# Patient Record
Sex: MALE | Race: WHITE | NOT HISPANIC OR LATINO | Employment: FULL TIME | URBAN - METROPOLITAN AREA
[De-identification: names, ages, dates, MRNs, and addresses within clinical notes are randomized per-mention and may not be internally consistent; named-entity substitution may affect disease eponyms.]

---

## 2017-01-11 ENCOUNTER — ALLSCRIPTS OFFICE VISIT (OUTPATIENT)
Dept: OTHER | Facility: OTHER | Age: 22
End: 2017-01-11

## 2017-02-14 ENCOUNTER — ALLSCRIPTS OFFICE VISIT (OUTPATIENT)
Dept: OTHER | Facility: OTHER | Age: 22
End: 2017-02-14

## 2017-02-14 DIAGNOSIS — R10.11 RIGHT UPPER QUADRANT PAIN: ICD-10-CM

## 2017-02-14 DIAGNOSIS — Z13.220 ENCOUNTER FOR SCREENING FOR LIPOID DISORDERS: ICD-10-CM

## 2017-06-21 ENCOUNTER — ALLSCRIPTS OFFICE VISIT (OUTPATIENT)
Dept: OTHER | Facility: OTHER | Age: 22
End: 2017-06-21

## 2017-06-24 LAB — CULTURE RESULT (HISTORICAL): NORMAL

## 2017-12-04 ENCOUNTER — ALLSCRIPTS OFFICE VISIT (OUTPATIENT)
Dept: OTHER | Facility: OTHER | Age: 22
End: 2017-12-04

## 2017-12-06 NOTE — PROGRESS NOTES
Assessment    1  Tongue sore (529 6) (K14 6)    Discussion/Summary  Patient reassured  His tongue is anatomically normal    Chief Complaint  Patient is here today with complaints of lumps on his tongue that he noticed a week ago, they are not painful  History of Present Illness  HPI: Next less is worried about some bumps on his tongue, in the far rear of his tongue  They do not hurt him  He has no systemic symptoms  He would like me to take a look  Active Problems  1  Abdominal pain, RUQ (789 01) (R10 11)   2  Acute infective pharyngitis (462) (J02 9)   3  Contusion of left eyelid, initial encounter (921 1) (S00 12XA)   4  Inguinal lymphadenopathy (785 6) (R59 0)   5  Palpitations (785 1) (R00 2)   6  Sacroiliitis (720 2) (M46 1)   7  Screening for lipoid disorders (V77 91) (Z13 220)   8  Spina bifida occulta (756 17) (Q76 0)   9  Spondylolisthesis, lumbosacral region (738 4) (M43 17)   10  Swimmer's ear, right (380 12) (H60 331)    Past Medical History  1  History of Denial Of Any Significant Medical History    Family History  Mother    1  Family history of Family Health Status Of Mother - Alive  Father    2  Family history of Family Health Status Of Father - Alive  Sister    3  Family history of Adult ADHD    Social History   · Caffeine Use   · Never A Smoker   · Never Drank Alcohol    Allergies  1  No Known Drug Allergies    Vitals   Recorded: [id number] 03:06PM   Heart Rate 64   Respiration 16   Systolic 946   Diastolic 76   Weight 634 lb 8 oz       Physical Exam   Constitutional  General appearance: No acute distress, well appearing and well nourished  Eyes  Conjunctiva and lids: No swelling, erythema, or discharge  Pupils and irises: Equal, round and reactive to light  Ears, Nose, Mouth, and Throat  External inspection of ears and nose: Normal    Otoscopic examination: Tympanic membrance translucent with normal light reflex  Canals patent without erythema     Nasal mucosa, septum, and turbinates: Normal without edema or erythema  Oropharynx: Normal with no erythema, edema, exudate or lesions  -- Normal papillae of the tongue  There is no ACL a or PCL a noted on exam   Pulmonary  Auscultation of lungs: Clear to auscultation, equal breath sounds bilaterally, no wheezes, no rales, no rhonci           Signatures   Electronically signed by : SHO Harris ; Dec  4 2017  9:25PM EST                       (Author)

## 2018-01-13 VITALS
HEART RATE: 70 BPM | SYSTOLIC BLOOD PRESSURE: 128 MMHG | WEIGHT: 160.13 LBS | RESPIRATION RATE: 16 BRPM | DIASTOLIC BLOOD PRESSURE: 60 MMHG | HEIGHT: 65 IN | BODY MASS INDEX: 26.68 KG/M2 | TEMPERATURE: 97.5 F

## 2018-01-14 VITALS
DIASTOLIC BLOOD PRESSURE: 74 MMHG | SYSTOLIC BLOOD PRESSURE: 120 MMHG | RESPIRATION RATE: 16 BRPM | TEMPERATURE: 98.6 F | WEIGHT: 153 LBS | HEART RATE: 72 BPM | BODY MASS INDEX: 25.46 KG/M2

## 2018-01-16 NOTE — PROGRESS NOTES
Assessment    1  Palpitations (785 1) (R00 2)   2  Encounter for preventive health examination (V70 0) (Z00 00)    Discussion/Summary  History is highly suggestive of PACs    he will call if he wants holter monitor  He is having some anxiety  Appropriate for where he is in terms of career  Impression: health maintenance visit  Currently, he eats a healthy diet and has an adequate exercise regimen  Prostate cancer screening: PSA is not indicated  Testicular cancer screening: the risks and benefits of testicular cancer screening were discussed  Chief Complaint    1  Palpitations    History of Present Illness  HM, Adult Male: The patient is being seen for a health maintenance evaluation  General Health: The patient's health since the last visit is described as good  He has regular dental visits  He denies vision problems  Lifestyle:  He consumes a diverse and healthy diet  He does not have any weight concerns  He exercises regularly  He does not use tobacco  He denies alcohol use  He denies drug use  Reproductive health:  the patient is not sexually active  birth control is not being practiced  He denies erectile dysfunction  Screening:   HPI: When he is at rest he notices a skipped heart beat, which radiates into his neck  He does not get SOB or CP  No light headed-ness  When he is working out he does not notice  He does not notice it at work when he is busy  Does not wake him up at night  He drinks little coffee or caffeine  He does not smoke  He is anxious and he is concerned about career choice and education  Palpitations: Irvin Rossi presents with complaints of palpitations  Associated symptoms include an irregular heartbeat, but no rapid heartbeat, no a fluttering heartbeat, no a pounding heartbeat, no chest pain, no diaphoresis, no dizziness, no dyspnea, no leg swelling, no lightheadedness, no nausea, no syncope and no weakness        Review of Systems    Constitutional: No fever or chills, feels well, no tiredness, no recent weight gain or weight loss  Eyes: No complaints of eye pain, no red eyes, no discharge from eyes, no itchy eyes  ENT: no complaints of earache, no hearing loss, no nosebleeds, no nasal discharge, no sore throat, no hoarseness  Cardiovascular: as noted in HPI  Respiratory: No complaints of shortness of breath, no wheezing, no cough, no SOB on exertion, no orthopnea or PND  Gastrointestinal: No complaints of abdominal pain, no constipation, no nausea or vomiting, no diarrhea or bloody stools  Genitourinary: No complaints of dysuria, no incontinence, no hesitancy, no nocturia, no genital lesion, no testicular pain  Musculoskeletal: No complaints of arthralgia, no myalgias, no joint swelling or stiffness, no limb pain or swelling  Integumentary: No complaints of skin rash or skin lesions, no itching, no skin wound, no dry skin  Neurological: No compliants of headache, no confusion, no convulsions, no numbness or tingling, no dizziness or fainting, no limb weakness, no difficulty walking  Active Problems    1  Contusion of left eyelid, initial encounter (921 1) (S00 12XA)   2  Inguinal lymphadenopathy (785 6) (R59 0)   3  Palpitations (785 1) (R00 2)   4  Sacroiliitis (720 2) (M46 1)   5  Spina bifida occulta (756 17) (Q76 0)   6  Spondylolisthesis, lumbosacral region (738 4) (M43 17)    Past Medical History    · History of Denial Of Any Significant Medical History    Surgical History    The surgical history was reviewed and updated today  Family History  Mother    · Family history of Family Health Status Of Mother - Alive  Father    · Family history of Family Health Status Of Father - Alive  Sister    · Family history of Adult ADHD    The family history was reviewed and updated today  Social History    · Caffeine Use   · Never A Smoker   · Never Drank Alcohol  The social history was reviewed and updated today   The social history was reviewed and is unchanged  Current Meds   1  No Reported Medications Recorded    Allergies    1  No Known Drug Allergies    Vitals   Recorded: [id number] 06:53PM   Heart Rate 68   Respiration 16   Systolic 900   Diastolic 80   Height 5 ft 5 in   Weight 157 lb 4 00 oz   BMI Calculated 26 17   BSA Calculated 1 79     Physical Exam    Constitutional   General appearance: No acute distress, well appearing and well nourished  Eyes   Conjunctiva and lids: No swelling, erythema, or discharge  Pupils and irises: Equal, round and reactive to light  Ears, Nose, Mouth, and Throat   External inspection of ears and nose: Normal     Otoscopic examination: Tympanic membrance translucent with normal light reflex  Canals patent without erythema  Oropharynx: Normal with no erythema, edema, exudate or lesions  Pulmonary   Respiratory effort: No increased work of breathing or signs of respiratory distress  Auscultation of lungs: Clear to auscultation  Cardiovascular   Palpation of heart: Normal PMI, no thrills  Auscultation of heart: Normal rate and rhythm, normal S1 and S2, without murmurs  Examination of extremities for edema and/or varicosities: Normal     Abdomen   Abdomen: Non-tender, no masses  Liver and spleen: No hepatomegaly or splenomegaly  Lymphatic   Palpation of lymph nodes in neck: No lymphadenopathy  Musculoskeletal   Gait and station: Normal     Digits and nails: Normal without clubbing or cyanosis  Inspection/palpation of joints, bones, and muscles: Normal     Skin   Skin and subcutaneous tissue: Normal without rashes or lesions  Neurologic   Cranial nerves: Cranial nerves 2-12 intact  Reflexes: 2+ and symmetric  Sensation: No sensory loss      Psychiatric   Orientation to person, place and time: Normal     Mood and affect: Normal        Signatures   Electronically signed by : SHO Brenner ; Jan 11 2017  7:35PM EST                       (Author)

## 2018-01-22 VITALS
HEART RATE: 68 BPM | WEIGHT: 157.25 LBS | BODY MASS INDEX: 26.2 KG/M2 | RESPIRATION RATE: 16 BRPM | SYSTOLIC BLOOD PRESSURE: 130 MMHG | DIASTOLIC BLOOD PRESSURE: 80 MMHG | HEIGHT: 65 IN

## 2018-01-23 VITALS
BODY MASS INDEX: 26.21 KG/M2 | HEART RATE: 64 BPM | RESPIRATION RATE: 16 BRPM | SYSTOLIC BLOOD PRESSURE: 126 MMHG | DIASTOLIC BLOOD PRESSURE: 76 MMHG | WEIGHT: 157.5 LBS

## 2018-04-06 ENCOUNTER — HOSPITAL ENCOUNTER (EMERGENCY)
Facility: HOSPITAL | Age: 23
Discharge: HOME | End: 2018-04-07
Attending: EMERGENCY MEDICINE
Payer: COMMERCIAL

## 2018-04-06 VITALS
DIASTOLIC BLOOD PRESSURE: 77 MMHG | TEMPERATURE: 97.6 F | WEIGHT: 169.2 LBS | BODY MASS INDEX: 28.16 KG/M2 | HEART RATE: 78 BPM | SYSTOLIC BLOOD PRESSURE: 151 MMHG | OXYGEN SATURATION: 97 % | RESPIRATION RATE: 18 BRPM

## 2018-04-06 DIAGNOSIS — S01.91XA LACERATION OF HEAD: Primary | ICD-10-CM

## 2018-04-06 RX ADMIN — Medication 1 APPLICATION: at 23:13

## 2018-04-07 PROCEDURE — 99283 EMERGENCY DEPT VISIT LOW MDM: CPT

## 2018-04-07 NOTE — DISCHARGE INSTRUCTIONS
Return in 7-10 days for staple removal       Laceration   WHAT YOU NEED TO KNOW:   A laceration is an injury to the skin and the soft tissue underneath it  Lacerations happen when you are cut or hit by something  They can happen anywhere on the body  DISCHARGE INSTRUCTIONS:   Return to the emergency department if:   · You have heavy bleeding or bleeding that does not stop after 10 minutes of holding firm, direct pressure over the wound  · Your wound opens up  Contact your healthcare provider if:   · You have a fever or chills  · Your laceration is red, warm, or swollen  · You have red streaks on your skin coming from your wound  · You have white or yellow drainage from the wound that smells bad  · You have pain that gets worse, even after treatment  · You have questions or concerns about your condition or care  Medicines:   · Prescription pain medicine  may be given  Ask how to take this medicine safely  · Antibiotics  help treat or prevent a bacterial infection  · Take your medicine as directed  Contact your healthcare provider if you think your medicine is not helping or if you have side effects  Tell him or her if you are allergic to any medicine  Keep a list of the medicines, vitamins, and herbs you take  Include the amounts, and when and why you take them  Bring the list or the pill bottles to follow-up visits  Carry your medicine list with you in case of an emergency  Care for your wound as directed:   · Do not get your wound wet  until your healthcare provider says it is okay  Do not soak your wound in water  Do not go swimming until your healthcare provider says it is okay  Carefully wash the wound with soap and water  Gently pat the area dry or allow it to air dry  · Change your bandages  when they get wet, dirty, or after washing  Apply new, clean bandages as directed  Do not apply elastic bandages or tape too tight  Do not put powders or lotions over your incision  · Apply antibiotic ointment as directed  Your healthcare provider may give you antibiotic ointment to put over your wound if you have stitches  If you have strips of tape over your incision, let them dry up and fall off on their own  If they do not fall off within 14 days, gently remove them  If you have glue over your wound, do not remove or pick at it  If your glue comes off, do not replace it with glue that you have at home  · Check your wound every day for signs of infection such as swelling, redness, or pus  Self-care:   · Apply ice  on your wound for 15 to 20 minutes every hour or as directed  Use an ice pack, or put crushed ice in a plastic bag  Cover it with a towel  Ice helps prevent tissue damage and decreases swelling and pain  · Use a splint as directed  A splint will decrease movement and stress on your wound  It may help it heal faster  A splint may be used for lacerations over joints or areas of your body that bend  Ask your healthcare provider how to apply and remove a splint  · Decrease scarring of your wound  by applying ointments as directed  Do not apply ointments until your healthcare provider says it is okay  You may need to wait until your wound is healed  Ask which ointment to buy and how often to use it  After your wound is healed, use sunscreen over the area when you are out in the sun  You should do this for at least 6 months to 1 year after your injury  Follow up with your healthcare provider as directed: You may need to follow up in 24 to 48 hours to have your wound checked for infection  You will need to return in 3 to 14 days if you have stitches or staples so they can be removed  Care for your wound as directed to prevent infection and help it heal  Write down your questions so you remember to ask them during your visits    © 2017 [employer] Information is for End User's use only and may not be sold, redistributed or otherwise used for commercial purposes  All illustrations and images included in CareNotes® are the copyrighted property of A [employer] Inc  or Aiden Li  The above information is an  only  It is not intended as medical advice for individual conditions or treatments  Talk to your doctor, nurse or pharmacist before following any medical regimen to see if it is safe and effective for you
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
## 2018-05-30 ENCOUNTER — OFFICE VISIT (OUTPATIENT)
Dept: FAMILY MEDICINE CLINIC | Facility: MEDICAL CENTER | Age: 23
End: 2018-05-30
Payer: COMMERCIAL

## 2018-05-30 VITALS
HEART RATE: 60 BPM | TEMPERATURE: 97.7 F | DIASTOLIC BLOOD PRESSURE: 80 MMHG | BODY MASS INDEX: 28.12 KG/M2 | OXYGEN SATURATION: 97 % | WEIGHT: 169 LBS | SYSTOLIC BLOOD PRESSURE: 120 MMHG

## 2018-05-30 DIAGNOSIS — R00.2 PALPITATIONS: Primary | ICD-10-CM

## 2018-05-30 DIAGNOSIS — F41.1 GAD (GENERALIZED ANXIETY DISORDER): ICD-10-CM

## 2018-05-30 PROCEDURE — 99213 OFFICE O/P EST LOW 20 MIN: CPT | Performed by: FAMILY MEDICINE

## 2018-08-15 ENCOUNTER — OFFICE VISIT (OUTPATIENT)
Dept: FAMILY MEDICINE CLINIC | Facility: MEDICAL CENTER | Age: 23
End: 2018-08-15
Payer: COMMERCIAL

## 2018-08-15 VITALS
WEIGHT: 164.2 LBS | SYSTOLIC BLOOD PRESSURE: 120 MMHG | HEART RATE: 80 BPM | TEMPERATURE: 98.4 F | BODY MASS INDEX: 27.32 KG/M2 | DIASTOLIC BLOOD PRESSURE: 82 MMHG | OXYGEN SATURATION: 98 %

## 2018-08-15 DIAGNOSIS — R00.2 PALPITATIONS: ICD-10-CM

## 2018-08-15 DIAGNOSIS — Z13.220 SCREENING FOR LIPID DISORDERS: ICD-10-CM

## 2018-08-15 DIAGNOSIS — T73.2XXD FATIGUE DUE TO EXPOSURE, SUBSEQUENT ENCOUNTER: Primary | ICD-10-CM

## 2018-08-15 DIAGNOSIS — Z13.1 SCREENING FOR DIABETES MELLITUS: ICD-10-CM

## 2018-08-15 PROCEDURE — 99213 OFFICE O/P EST LOW 20 MIN: CPT | Performed by: FAMILY MEDICINE

## 2018-08-16 NOTE — PROGRESS NOTES
Patient was seen at urgent care on August 9th  He was feeling poorly, generalized malaise, week  Those notes were reviewed  A blood sugar was done at the urgent care which was basically normal   A Lyme titer was ordered which was equivocal     He is feeling much better today  /82 (BP Location: Left arm, Patient Position: Sitting, Cuff Size: Large)   Pulse 80   Temp 98 4 °F (36 9 °C)   Wt 74 5 kg (164 lb 3 2 oz)   SpO2 98%   BMI 27 32 kg/m²     HEENT examination is within normal limits no acute findings  Neck was supple  Chest clear  Cardiac exam revealed a regular rate and rhythm without murmur rub or gallop  Abdomen is soft and nontender  Will continue to observe  He should stay well hydrated at work  Because of the equivocal Lyme titer and his generalized nonspecific symptoms, I do think we need to check a repeat Lyme  He is also interested in getting additional blood work for screening purposes

## 2019-01-02 ENCOUNTER — OFFICE VISIT (OUTPATIENT)
Dept: FAMILY MEDICINE CLINIC | Facility: MEDICAL CENTER | Age: 24
End: 2019-01-02
Payer: COMMERCIAL

## 2019-01-02 VITALS
WEIGHT: 173 LBS | DIASTOLIC BLOOD PRESSURE: 88 MMHG | OXYGEN SATURATION: 98 % | SYSTOLIC BLOOD PRESSURE: 130 MMHG | HEART RATE: 86 BPM | BODY MASS INDEX: 28.79 KG/M2

## 2019-01-02 DIAGNOSIS — F41.1 GAD (GENERALIZED ANXIETY DISORDER): Primary | ICD-10-CM

## 2019-01-02 PROCEDURE — 99213 OFFICE O/P EST LOW 20 MIN: CPT | Performed by: FAMILY MEDICINE

## 2019-01-02 RX ORDER — ESCITALOPRAM OXALATE 10 MG/1
10 TABLET ORAL DAILY
Qty: 30 TABLET | Refills: 1 | Status: SHIPPED | OUTPATIENT
Start: 2019-01-02 | End: 2019-04-23 | Stop reason: SDUPTHER

## 2019-01-02 RX ORDER — HYDROXYZINE PAMOATE 25 MG/1
25-50 CAPSULE ORAL
Qty: 30 CAPSULE | Refills: 0 | Status: SHIPPED | OUTPATIENT
Start: 2019-01-02 | End: 2019-04-23 | Stop reason: ALTCHOICE

## 2019-01-03 ENCOUNTER — TELEPHONE (OUTPATIENT)
Dept: FAMILY MEDICINE CLINIC | Facility: MEDICAL CENTER | Age: 24
End: 2019-01-03

## 2019-01-03 NOTE — TELEPHONE ENCOUNTER
Using p r n  Medication for anxiety was exactly what I was trying to avoid  I did not in any way think he needed to take Lexapro indefinitely  I would recommend that he reconsider his decision not to try the medication  I simply wanted to use that for about 6-8 months  But that is okay if he does not want to take it  I would like to reinforce the idea that he see a counselor  He does not need to keep the follow-up appointment that was made, verify that with him  If he wants to keep but he can if not please take it out of the schedule

## 2019-01-03 NOTE — PROGRESS NOTES
Patient has long history of anxiety  Anxiety also runs in his family  He has a hard time sleeping  He also frequently gets on the verge of panic often times at work  There is no history of suicidal ideation  /88 (BP Location: Left arm, Patient Position: Sitting, Cuff Size: Large)   Pulse 86   Wt 78 5 kg (173 lb)   SpO2 98%   BMI 28 79 kg/m²     Patient appears well his affect is normal thought content is normal     Will begin Lexapro at 10 mg  He was given some hydroxyzine by a neighbor and he felt that that worked very well to help him get to sleep  Will use hydroxyzine short term  Recheck in approximately three weeks

## 2019-01-03 NOTE — TELEPHONE ENCOUNTER
Pt called to let you know that he prefers not to take the Lexapro prescribed at appointment  He states he does not want a daily medication  He says that makes him uncomfortable and prefers something more short term and on an as needed basis  He agrees to the Hydroxyzine but not the Lexapro   FYI

## 2019-01-03 NOTE — TELEPHONE ENCOUNTER
Pt states he will consider taking the Lexapro  He will let us know within the next week if he decides to start it  He does not want to cancel the appointment for a f/u yet until he makes up his mind about starting the Lexapro

## 2019-02-13 ENCOUNTER — OFFICE VISIT (OUTPATIENT)
Dept: FAMILY MEDICINE CLINIC | Facility: MEDICAL CENTER | Age: 24
End: 2019-02-13
Payer: COMMERCIAL

## 2019-02-13 VITALS
BODY MASS INDEX: 28.29 KG/M2 | WEIGHT: 170 LBS | HEART RATE: 84 BPM | OXYGEN SATURATION: 98 % | SYSTOLIC BLOOD PRESSURE: 120 MMHG | DIASTOLIC BLOOD PRESSURE: 78 MMHG

## 2019-02-13 DIAGNOSIS — F41.1 GAD (GENERALIZED ANXIETY DISORDER): Primary | ICD-10-CM

## 2019-02-13 PROCEDURE — 99213 OFFICE O/P EST LOW 20 MIN: CPT | Performed by: FAMILY MEDICINE

## 2019-03-10 DIAGNOSIS — F41.1 GAD (GENERALIZED ANXIETY DISORDER): ICD-10-CM

## 2019-03-10 RX ORDER — ESCITALOPRAM OXALATE 10 MG/1
TABLET ORAL
Qty: 30 TABLET | Refills: 0 | OUTPATIENT
Start: 2019-03-10

## 2019-03-11 NOTE — PROGRESS NOTES
Harmony Moore is here for follow-up of anxiety, at his previous visit we started Lexapro  He has decided that he does not need the Lexapro  He has quit taking it  He is doing well  Past medical history, past surgical history, family medical history, social history, and medication list were all reviewed  Review of systems for GI  cardiac pulmonary and neurologic systems are all negative  ENT review of systems is also negative  /78 (BP Location: Left arm, Patient Position: Sitting, Cuff Size: Large)   Pulse 84   Wt 77 1 kg (170 lb)   SpO2 98%   BMI 28 29 kg/m²     He looks well, mental status is normal   Emotional affect is appropriate  He will continue to not use the Lexapro  He can follow up with me at any time if he wants to reconsider

## 2019-03-19 DIAGNOSIS — F41.1 GAD (GENERALIZED ANXIETY DISORDER): ICD-10-CM

## 2019-03-20 RX ORDER — ESCITALOPRAM OXALATE 10 MG/1
TABLET ORAL
Qty: 30 TABLET | Refills: 0 | OUTPATIENT
Start: 2019-03-20

## 2019-03-31 DIAGNOSIS — F41.1 GAD (GENERALIZED ANXIETY DISORDER): ICD-10-CM

## 2019-03-31 RX ORDER — ESCITALOPRAM OXALATE 10 MG/1
TABLET ORAL
Qty: 30 TABLET | Refills: 0 | OUTPATIENT
Start: 2019-03-31

## 2019-04-23 ENCOUNTER — OFFICE VISIT (OUTPATIENT)
Dept: FAMILY MEDICINE CLINIC | Facility: MEDICAL CENTER | Age: 24
End: 2019-04-23
Payer: COMMERCIAL

## 2019-04-23 VITALS
BODY MASS INDEX: 31.12 KG/M2 | DIASTOLIC BLOOD PRESSURE: 88 MMHG | RESPIRATION RATE: 18 BRPM | HEART RATE: 80 BPM | SYSTOLIC BLOOD PRESSURE: 140 MMHG | WEIGHT: 187 LBS

## 2019-04-23 DIAGNOSIS — F41.1 GAD (GENERALIZED ANXIETY DISORDER): ICD-10-CM

## 2019-04-23 PROCEDURE — 99213 OFFICE O/P EST LOW 20 MIN: CPT | Performed by: FAMILY MEDICINE

## 2019-04-24 RX ORDER — ESCITALOPRAM OXALATE 10 MG/1
10 TABLET ORAL DAILY
Qty: 90 TABLET | Refills: 1 | Status: SHIPPED | OUTPATIENT
Start: 2019-04-24 | End: 2019-04-29 | Stop reason: SDUPTHER

## 2019-04-29 ENCOUNTER — TELEPHONE (OUTPATIENT)
Dept: FAMILY MEDICINE CLINIC | Facility: MEDICAL CENTER | Age: 24
End: 2019-04-29

## 2019-04-29 DIAGNOSIS — F41.1 GAD (GENERALIZED ANXIETY DISORDER): ICD-10-CM

## 2019-04-29 RX ORDER — ESCITALOPRAM OXALATE 10 MG/1
10 TABLET ORAL DAILY
Qty: 90 TABLET | Refills: 1 | Status: SHIPPED | OUTPATIENT
Start: 2019-04-29 | End: 2019-10-16 | Stop reason: SDUPTHER

## 2019-07-29 ENCOUNTER — HOSPITAL ENCOUNTER (EMERGENCY)
Facility: HOSPITAL | Age: 24
Discharge: HOME | End: 2019-07-29
Attending: EMERGENCY MEDICINE | Admitting: EMERGENCY MEDICINE
Payer: COMMERCIAL

## 2019-07-29 ENCOUNTER — APPOINTMENT (EMERGENCY)
Dept: CT IMAGING | Facility: HOSPITAL | Age: 24
End: 2019-07-29
Payer: COMMERCIAL

## 2019-07-29 VITALS
RESPIRATION RATE: 20 BRPM | TEMPERATURE: 99.5 F | BODY MASS INDEX: 27.46 KG/M2 | OXYGEN SATURATION: 97 % | WEIGHT: 165 LBS | SYSTOLIC BLOOD PRESSURE: 183 MMHG | DIASTOLIC BLOOD PRESSURE: 80 MMHG | HEART RATE: 68 BPM

## 2019-07-29 DIAGNOSIS — S16.1XXA CERVICAL STRAIN, ACUTE, INITIAL ENCOUNTER: ICD-10-CM

## 2019-07-29 DIAGNOSIS — S19.9XXA INJURY OF NECK, INITIAL ENCOUNTER: Primary | ICD-10-CM

## 2019-07-29 PROCEDURE — 99284 EMERGENCY DEPT VISIT MOD MDM: CPT | Performed by: EMERGENCY MEDICINE

## 2019-07-29 PROCEDURE — 72125 CT NECK SPINE W/O DYE: CPT

## 2019-07-29 PROCEDURE — 96372 THER/PROPH/DIAG INJ SC/IM: CPT

## 2019-07-29 PROCEDURE — 72128 CT CHEST SPINE W/O DYE: CPT

## 2019-07-29 PROCEDURE — 99283 EMERGENCY DEPT VISIT LOW MDM: CPT

## 2019-07-29 RX ORDER — METHOCARBAMOL 500 MG/1
750 TABLET, FILM COATED ORAL ONCE
Status: COMPLETED | OUTPATIENT
Start: 2019-07-29 | End: 2019-07-29

## 2019-07-29 RX ORDER — NAPROXEN 500 MG/1
500 TABLET ORAL 2 TIMES DAILY PRN
Qty: 30 TABLET | Refills: 0 | Status: SHIPPED | OUTPATIENT
Start: 2019-07-29 | End: 2019-10-16 | Stop reason: ALTCHOICE

## 2019-07-29 RX ORDER — METHOCARBAMOL 750 MG/1
750 TABLET, FILM COATED ORAL 3 TIMES DAILY PRN
Qty: 21 TABLET | Refills: 0 | Status: SHIPPED | OUTPATIENT
Start: 2019-07-29 | End: 2019-10-16 | Stop reason: ALTCHOICE

## 2019-07-29 RX ORDER — KETOROLAC TROMETHAMINE 30 MG/ML
30 INJECTION, SOLUTION INTRAMUSCULAR; INTRAVENOUS ONCE
Status: COMPLETED | OUTPATIENT
Start: 2019-07-29 | End: 2019-07-29

## 2019-07-29 RX ADMIN — KETOROLAC TROMETHAMINE 30 MG: 30 INJECTION, SOLUTION INTRAMUSCULAR at 10:35

## 2019-07-29 RX ADMIN — METHOCARBAMOL TABLETS 750 MG: 500 TABLET, COATED ORAL at 10:34
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
## 2019-08-15 ENCOUNTER — TELEPHONE (OUTPATIENT)
Dept: FAMILY MEDICINE CLINIC | Facility: MEDICAL CENTER | Age: 24
End: 2019-08-15

## 2019-08-15 NOTE — TELEPHONE ENCOUNTER
Refer him to [facility]    They can call and they will get him set with PT, etc   He can even self refer
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
## 2019-09-16 ENCOUNTER — TELEPHONE (OUTPATIENT)
Dept: FAMILY MEDICINE CLINIC | Facility: MEDICAL CENTER | Age: 24
End: 2019-09-16

## 2019-09-16 NOTE — TELEPHONE ENCOUNTER
Pt asked for an appointment for trouble falling asleep  He cant come in today and wants a late day or evening appointment  None available for a couple of weeks  He has used OTC medication to help him fall asleep but not helping  When he does fall asleep in usually sleeps 7-8 but in 4 hour interavels  Can you recommend something or suggest a time he could be seen

## 2019-10-16 ENCOUNTER — OFFICE VISIT (OUTPATIENT)
Dept: FAMILY MEDICINE CLINIC | Facility: MEDICAL CENTER | Age: 24
End: 2019-10-16
Payer: COMMERCIAL

## 2019-10-16 VITALS
WEIGHT: 183 LBS | OXYGEN SATURATION: 98 % | SYSTOLIC BLOOD PRESSURE: 120 MMHG | BODY MASS INDEX: 30.45 KG/M2 | DIASTOLIC BLOOD PRESSURE: 84 MMHG | HEART RATE: 78 BPM

## 2019-10-16 DIAGNOSIS — F41.1 GAD (GENERALIZED ANXIETY DISORDER): ICD-10-CM

## 2019-10-16 DIAGNOSIS — F51.01 PRIMARY INSOMNIA: Primary | ICD-10-CM

## 2019-10-16 PROCEDURE — 99213 OFFICE O/P EST LOW 20 MIN: CPT | Performed by: FAMILY MEDICINE

## 2019-10-16 RX ORDER — TRAZODONE HYDROCHLORIDE 50 MG/1
50-100 TABLET ORAL
Qty: 60 TABLET | Refills: 1 | Status: SHIPPED | OUTPATIENT
Start: 2019-10-16 | End: 2019-12-11 | Stop reason: SDUPTHER

## 2019-10-16 RX ORDER — ESCITALOPRAM OXALATE 10 MG/1
10 TABLET ORAL DAILY
Qty: 90 TABLET | Refills: 1 | Status: SHIPPED | OUTPATIENT
Start: 2019-10-16 | End: 2020-03-23 | Stop reason: SDUPTHER

## 2019-10-28 NOTE — PROGRESS NOTES
Patient has history of generalized anxiety  We have treated him in the past with Lexapro  He has been somewhat resistant to taking it  He would like to restart the Lexapro at this time  He also has trouble falling asleep which may be related to the anxiety  He has no suicidal ideation  He works in does well on the job and his home life is stable    /84 (BP Location: Left arm, Patient Position: Sitting, Cuff Size: Adult)   Pulse 78   Wt 83 kg (183 lb)   SpO2 98%   BMI 30 45 kg/m²     Patient appears well  Thought processes normal   He is completely alert and mental status is intact  Will restart the Lexapro  Short-term use of trazodone to help establish a better sleep wake cycle  Recheck in 2-3 weeks

## 2019-12-11 DIAGNOSIS — F51.01 PRIMARY INSOMNIA: ICD-10-CM

## 2019-12-13 RX ORDER — TRAZODONE HYDROCHLORIDE 50 MG/1
50-100 TABLET ORAL
Qty: 60 TABLET | Refills: 1 | Status: SHIPPED | OUTPATIENT
Start: 2019-12-13 | End: 2020-03-27

## 2020-02-11 ENCOUNTER — HOSPITAL ENCOUNTER (INPATIENT)
Facility: HOSPITAL | Age: 25
LOS: 3 days | Discharge: HOME | DRG: 315 | End: 2020-02-14
Attending: EMERGENCY MEDICINE | Admitting: HOSPITALIST
Payer: COMMERCIAL

## 2020-02-11 ENCOUNTER — APPOINTMENT (EMERGENCY)
Dept: RADIOLOGY | Facility: HOSPITAL | Age: 25
DRG: 315 | End: 2020-02-11
Payer: COMMERCIAL

## 2020-02-11 ENCOUNTER — APPOINTMENT (EMERGENCY)
Dept: CT IMAGING | Facility: HOSPITAL | Age: 25
DRG: 315 | End: 2020-02-11
Payer: COMMERCIAL

## 2020-02-11 DIAGNOSIS — I40.9 ACUTE MYOCARDITIS, UNSPECIFIED MYOCARDITIS TYPE: Primary | ICD-10-CM

## 2020-02-11 PROBLEM — E87.6 HYPOKALEMIA: Status: ACTIVE | Noted: 2020-02-11

## 2020-02-11 PROBLEM — R07.89 OTHER CHEST PAIN: Status: ACTIVE | Noted: 2020-02-11

## 2020-02-11 LAB
ALBUMIN SERPL BCP-MCNC: 3.8 G/DL (ref 3.5–5)
ALP SERPL-CCNC: 92 U/L (ref 46–116)
ALT SERPL W P-5'-P-CCNC: 45 U/L (ref 12–78)
ANION GAP SERPL CALCULATED.3IONS-SCNC: 9 MMOL/L (ref 4–13)
AST SERPL W P-5'-P-CCNC: 80 U/L (ref 5–45)
BASOPHILS # BLD AUTO: 0.01 THOUSANDS/ΜL (ref 0–0.1)
BASOPHILS NFR BLD AUTO: 0 % (ref 0–1)
BILIRUB SERPL-MCNC: 0.25 MG/DL (ref 0.2–1)
BUN SERPL-MCNC: 11 MG/DL (ref 5–25)
CALCIUM SERPL-MCNC: 9.5 MG/DL (ref 8.3–10.1)
CHLORIDE SERPL-SCNC: 102 MMOL/L (ref 100–108)
CO2 SERPL-SCNC: 31 MMOL/L (ref 21–32)
CREAT SERPL-MCNC: 0.93 MG/DL (ref 0.6–1.3)
CRP SERPL QL: >90 MG/L
EOSINOPHIL # BLD AUTO: 0.12 THOUSAND/ΜL (ref 0–0.61)
EOSINOPHIL NFR BLD AUTO: 2 % (ref 0–6)
ERYTHROCYTE [DISTWIDTH] IN BLOOD BY AUTOMATED COUNT: 11.8 % (ref 11.6–15.1)
ERYTHROCYTE [SEDIMENTATION RATE] IN BLOOD: 30 MM/HOUR (ref 0–10)
GFR SERPL CREATININE-BSD FRML MDRD: 115 ML/MIN/1.73SQ M
GLUCOSE SERPL-MCNC: 101 MG/DL (ref 65–140)
HCT VFR BLD AUTO: 40.9 % (ref 36.5–49.3)
HGB BLD-MCNC: 13.7 G/DL (ref 12–17)
IMM GRANULOCYTES # BLD AUTO: 0.03 THOUSAND/UL (ref 0–0.2)
IMM GRANULOCYTES NFR BLD AUTO: 0 % (ref 0–2)
LYMPHOCYTES # BLD AUTO: 1.75 THOUSANDS/ΜL (ref 0.6–4.47)
LYMPHOCYTES NFR BLD AUTO: 25 % (ref 14–44)
MAGNESIUM SERPL-MCNC: 1.8 MG/DL (ref 1.6–2.6)
MCH RBC QN AUTO: 29.2 PG (ref 26.8–34.3)
MCHC RBC AUTO-ENTMCNC: 33.5 G/DL (ref 31.4–37.4)
MCV RBC AUTO: 87 FL (ref 82–98)
MONOCYTES # BLD AUTO: 0.79 THOUSAND/ΜL (ref 0.17–1.22)
MONOCYTES NFR BLD AUTO: 11 % (ref 4–12)
NEUTROPHILS # BLD AUTO: 4.36 THOUSANDS/ΜL (ref 1.85–7.62)
NEUTS SEG NFR BLD AUTO: 62 % (ref 43–75)
NRBC BLD AUTO-RTO: 0 /100 WBCS
PLATELET # BLD AUTO: 235 THOUSANDS/UL (ref 149–390)
PMV BLD AUTO: 9.1 FL (ref 8.9–12.7)
POTASSIUM SERPL-SCNC: 3.4 MMOL/L (ref 3.5–5.3)
PROT SERPL-MCNC: 7.7 G/DL (ref 6.4–8.2)
RBC # BLD AUTO: 4.69 MILLION/UL (ref 3.88–5.62)
SODIUM SERPL-SCNC: 142 MMOL/L (ref 136–145)
TROPONIN I SERPL-MCNC: 13.48 NG/ML
TROPONIN I SERPL-MCNC: 14.3 NG/ML
TROPONIN I SERPL-MCNC: 16.2 NG/ML
WBC # BLD AUTO: 7.06 THOUSAND/UL (ref 4.31–10.16)

## 2020-02-11 PROCEDURE — 85652 RBC SED RATE AUTOMATED: CPT | Performed by: EMERGENCY MEDICINE

## 2020-02-11 PROCEDURE — 85025 COMPLETE CBC W/AUTO DIFF WBC: CPT | Performed by: EMERGENCY MEDICINE

## 2020-02-11 PROCEDURE — 70498 CT ANGIOGRAPHY NECK: CPT

## 2020-02-11 PROCEDURE — 84484 ASSAY OF TROPONIN QUANT: CPT | Performed by: EMERGENCY MEDICINE

## 2020-02-11 PROCEDURE — 99285 EMERGENCY DEPT VISIT HI MDM: CPT

## 2020-02-11 PROCEDURE — 84484 ASSAY OF TROPONIN QUANT: CPT | Performed by: PHYSICIAN ASSISTANT

## 2020-02-11 PROCEDURE — 70496 CT ANGIOGRAPHY HEAD: CPT

## 2020-02-11 PROCEDURE — 36415 COLL VENOUS BLD VENIPUNCTURE: CPT | Performed by: EMERGENCY MEDICINE

## 2020-02-11 PROCEDURE — 99222 1ST HOSP IP/OBS MODERATE 55: CPT | Performed by: PHYSICIAN ASSISTANT

## 2020-02-11 PROCEDURE — 83735 ASSAY OF MAGNESIUM: CPT | Performed by: PHYSICIAN ASSISTANT

## 2020-02-11 PROCEDURE — 71046 X-RAY EXAM CHEST 2 VIEWS: CPT

## 2020-02-11 PROCEDURE — 93005 ELECTROCARDIOGRAM TRACING: CPT

## 2020-02-11 PROCEDURE — 80053 COMPREHEN METABOLIC PANEL: CPT | Performed by: EMERGENCY MEDICINE

## 2020-02-11 PROCEDURE — 99285 EMERGENCY DEPT VISIT HI MDM: CPT | Performed by: EMERGENCY MEDICINE

## 2020-02-11 PROCEDURE — 86140 C-REACTIVE PROTEIN: CPT | Performed by: EMERGENCY MEDICINE

## 2020-02-11 RX ORDER — TRAZODONE HYDROCHLORIDE 50 MG/1
50 TABLET ORAL
Status: DISCONTINUED | OUTPATIENT
Start: 2020-02-11 | End: 2020-02-14 | Stop reason: HOSPADM

## 2020-02-11 RX ORDER — KETOROLAC TROMETHAMINE 30 MG/ML
15 INJECTION, SOLUTION INTRAMUSCULAR; INTRAVENOUS EVERY 6 HOURS PRN
Status: DISCONTINUED | OUTPATIENT
Start: 2020-02-11 | End: 2020-02-13

## 2020-02-11 RX ORDER — ONDANSETRON 2 MG/ML
4 INJECTION INTRAMUSCULAR; INTRAVENOUS EVERY 6 HOURS PRN
Status: DISCONTINUED | OUTPATIENT
Start: 2020-02-11 | End: 2020-02-14 | Stop reason: HOSPADM

## 2020-02-11 RX ORDER — KETOROLAC TROMETHAMINE 30 MG/ML
30 INJECTION, SOLUTION INTRAMUSCULAR; INTRAVENOUS EVERY 6 HOURS PRN
Status: DISCONTINUED | OUTPATIENT
Start: 2020-02-11 | End: 2020-02-13

## 2020-02-11 RX ORDER — SODIUM CHLORIDE 9 MG/ML
125 INJECTION, SOLUTION INTRAVENOUS CONTINUOUS
Status: DISCONTINUED | OUTPATIENT
Start: 2020-02-11 | End: 2020-02-14 | Stop reason: HOSPADM

## 2020-02-11 RX ORDER — KETOROLAC TROMETHAMINE 30 MG/ML
15 INJECTION, SOLUTION INTRAMUSCULAR; INTRAVENOUS ONCE
Status: COMPLETED | OUTPATIENT
Start: 2020-02-11 | End: 2020-02-11

## 2020-02-11 RX ORDER — ACETAMINOPHEN 325 MG/1
650 TABLET ORAL EVERY 6 HOURS PRN
Status: DISCONTINUED | OUTPATIENT
Start: 2020-02-11 | End: 2020-02-14 | Stop reason: HOSPADM

## 2020-02-11 RX ORDER — POTASSIUM CHLORIDE 20 MEQ/1
20 TABLET, EXTENDED RELEASE ORAL ONCE
Status: COMPLETED | OUTPATIENT
Start: 2020-02-11 | End: 2020-02-11

## 2020-02-11 RX ADMIN — IOHEXOL 85 ML: 350 INJECTION, SOLUTION INTRAVENOUS at 17:03

## 2020-02-11 RX ADMIN — KETOROLAC TROMETHAMINE 15 MG: 30 INJECTION, SOLUTION INTRAMUSCULAR at 19:07

## 2020-02-11 RX ADMIN — SODIUM CHLORIDE 125 ML/HR: 0.9 INJECTION, SOLUTION INTRAVENOUS at 21:48

## 2020-02-11 RX ADMIN — POTASSIUM CHLORIDE 20 MEQ: 1500 TABLET, EXTENDED RELEASE ORAL at 21:49

## 2020-02-11 RX ADMIN — TRAZODONE HYDROCHLORIDE 50 MG: 50 TABLET ORAL at 21:49

## 2020-02-11 NOTE — ED PROVIDER NOTES
History  Chief Complaint   Patient presents with    Neck Pain     per pt "he started with an onset chest pain with some SOB, pain in neck begun first after he woke upwith the sob and the numbness, "    Chest Pain     Patient is a 50-year-old male with no significant past medical history who presents with left-sided neck pain  Patient states he woke up this morning with left anterior neck pain radiating into his left chest   He states the pain causes some shortness of breath  He also has had lightheadedness and developed left upper extremity weakness as the day progressed  He denies headaches, fever, chills, numbness, tingling, speech difficulty, gait instability, visual changes or other complaints  He also denies a recent history of trauma  He was feeling well until he woke up this morning  He has not had similar symptoms previously  History provided by:  Patient  Neck Pain   Pain location:  L side  Radiates to: chest   Pain severity:  Moderate  Pain is:  Same all the time  Timing:  Constant  Progression:  Unchanged  Chronicity:  New  Ineffective treatments:  None tried  Associated symptoms: chest pain and weakness (LUE)    Associated symptoms: no fever, no headaches, no numbness, no photophobia, no syncope and no tingling    Chest Pain   Associated symptoms: weakness (LUE)    Associated symptoms: no abdominal pain, no back pain, no cough, no diaphoresis, no dizziness, no dysphagia, no fever, no headache, no nausea, no numbness, no palpitations, no shortness of breath, no syncope and not vomiting        Prior to Admission Medications   Prescriptions Last Dose Informant Patient Reported?  Taking?   escitalopram (LEXAPRO) 10 mg tablet Not Taking at Unknown time  No No   Sig: Take 1 tablet (10 mg total) by mouth daily   Patient not taking: Reported on 2/11/2020   traZODone (DESYREL) 50 mg tablet 2/10/2020 at Unknown time  No Yes   Sig: TAKE 1-2 TABLETS ( MG TOTAL) BY MOUTH DAILY AT BEDTIME Facility-Administered Medications: None       History reviewed  No pertinent past medical history  History reviewed  No pertinent surgical history  Family History   Problem Relation Age of Onset    No Known Problems Mother     No Known Problems Father     ADD / ADHD Sister      I have reviewed and agree with the history as documented  Social History     Tobacco Use    Smoking status: Current Some Day Smoker    Smokeless tobacco: Never Used   Substance Use Topics    Alcohol use: Yes     Comment: Socially    Drug use: No       Review of Systems   Constitutional: Negative for chills, diaphoresis and fever  HENT: Negative for nosebleeds, sore throat and trouble swallowing  Eyes: Negative for photophobia, pain and visual disturbance  Respiratory: Negative for cough, chest tightness and shortness of breath  Cardiovascular: Positive for chest pain  Negative for palpitations, leg swelling and syncope  Gastrointestinal: Negative for abdominal pain, constipation, diarrhea, nausea and vomiting  Endocrine: Negative for polydipsia and polyuria  Genitourinary: Negative for difficulty urinating, dysuria and hematuria  Musculoskeletal: Positive for neck pain  Negative for back pain and neck stiffness  Skin: Negative for pallor and rash  Neurological: Positive for weakness (LUE)  Negative for dizziness, tingling, syncope, light-headedness, numbness and headaches  All other systems reviewed and are negative  Physical Exam  Physical Exam   Constitutional: He is oriented to person, place, and time  He appears well-developed and well-nourished  No distress  HENT:   Head: Normocephalic and atraumatic  Mouth/Throat: Oropharynx is clear and moist and mucous membranes are normal    Eyes: Pupils are equal, round, and reactive to light  EOM are normal    Neck: Normal range of motion  Neck supple     Cardiovascular: Normal rate, regular rhythm, normal heart sounds, intact distal pulses and normal pulses  Pulmonary/Chest: Effort normal and breath sounds normal  No respiratory distress  Abdominal: Soft  He exhibits no distension  There is no tenderness  There is no rigidity, no rebound and no guarding  Musculoskeletal: Normal range of motion  He exhibits no edema or tenderness  Lymphadenopathy:     He has no cervical adenopathy  Neurological: He is alert and oriented to person, place, and time  No cranial nerve deficit or sensory deficit  4+/5 strength in LUE  5/5 in all other muscle groups  Speech fluent  Visual fields intact  Skin: Skin is warm and dry  Capillary refill takes less than 2 seconds  Psychiatric: He has a normal mood and affect  Nursing note and vitals reviewed        Vital Signs  ED Triage Vitals   Temperature Pulse Respirations Blood Pressure SpO2   02/11/20 1548 02/11/20 1546 02/11/20 1546 02/11/20 1546 02/11/20 1546   99 2 °F (37 3 °C) 60 18 144/80 100 %      Temp Source Heart Rate Source Patient Position - Orthostatic VS BP Location FiO2 (%)   02/11/20 1546 02/11/20 1546 02/11/20 1546 02/11/20 1546 --   Oral Monitor Lying Right arm       Pain Score       02/11/20 1546       8           Vitals:    02/11/20 1646 02/11/20 1821 02/11/20 1930 02/11/20 2001   BP: 137/63 136/77 120/59 122/70   Pulse: 66 86 66 66   Patient Position - Orthostatic VS: Lying Lying Sitting Lying         Visual Acuity      ED Medications  Medications   potassium chloride (K-DUR,KLOR-CON) CR tablet 20 mEq (has no administration in time range)   ketorolac (TORADOL) injection 15 mg (has no administration in time range)   ketorolac (TORADOL) injection 30 mg (has no administration in time range)   traZODone (DESYREL) tablet 50 mg (has no administration in time range)   sodium chloride 0 9 % infusion (has no administration in time range)   ondansetron (ZOFRAN) injection 4 mg (has no administration in time range)   acetaminophen (TYLENOL) tablet 650 mg (has no administration in time range)   iohexol (OMNIPAQUE) 350 MG/ML injection (MULTI-DOSE) 85 mL (85 mL Intravenous Given 2/11/20 1703)   ketorolac (TORADOL) injection 15 mg (15 mg Intravenous Given 2/11/20 1907)       Diagnostic Studies  Results Reviewed     Procedure Component Value Units Date/Time    Magnesium [[id number]] Collected:  02/11/20 1622    Lab Status:   In process Specimen:  Blood from Arm, Right Updated:  02/11/20 2030    Troponin I [[id number]]  (Abnormal) Collected:  02/11/20 1925    Lab Status:  Final result Specimen:  Blood from Arm, Right Updated:  02/11/20 1952     Troponin I 16 20 ng/mL     C-reactive protein [[id number]]  (Abnormal) Collected:  02/11/20 1622    Lab Status:  Final result Specimen:  Blood from Arm, Right Updated:  02/11/20 1850     CRP >90 0 mg/L     Sedimentation rate, automated [[id number]]  (Abnormal) Collected:  02/11/20 1622    Lab Status:  Final result Specimen:  Blood from Arm, Right Updated:  02/11/20 1835     Sed Rate 30 mm/hour     Troponin I [[id number]]  (Abnormal) Collected:  02/11/20 1622    Lab Status:  Final result Specimen:  Blood from Arm, Right Updated:  02/11/20 1649     Troponin I 13 48 ng/mL     Comprehensive metabolic panel [[id number]]  (Abnormal) Collected:  02/11/20 1622    Lab Status:  Final result Specimen:  Blood from Arm, Right Updated:  02/11/20 1646     Sodium 142 mmol/L      Potassium 3 4 mmol/L      Chloride 102 mmol/L      CO2 31 mmol/L      ANION GAP 9 mmol/L      BUN 11 mg/dL      Creatinine 0 93 mg/dL      Glucose 101 mg/dL      Calcium 9 5 mg/dL      AST 80 U/L      ALT 45 U/L      Alkaline Phosphatase 92 U/L      Total Protein 7 7 g/dL      Albumin 3 8 g/dL      Total Bilirubin 0 25 mg/dL      eGFR 115 ml/min/1 73sq m     Narrative:       Ramy guidelines for Chronic Kidney Disease (CKD):     Stage 1 with normal or high GFR (GFR > 90 mL/min/1 73 square meters)    Stage 2 Mild CKD (GFR = 60-89 mL/min/1 73 square meters)    Stage 3A Moderate CKD (GFR = 45-59 mL/min/1 73 square meters)    Stage 3B Moderate CKD (GFR = 30-44 mL/min/1 73 square meters)    Stage 4 Severe CKD (GFR = 15-29 mL/min/1 73 square meters)    Stage 5 End Stage CKD (GFR <15 mL/min/1 73 square meters)  Note: GFR calculation is accurate only with a steady state creatinine    CBC and differential [[id number]] Collected:  02/11/20 1622    Lab Status:  Final result Specimen:  Blood from Arm, Right Updated:  02/11/20 1631     WBC 7 06 Thousand/uL      RBC 4 69 Million/uL      Hemoglobin 13 7 g/dL      Hematocrit 40 9 %      MCV 87 fL      MCH 29 2 pg      MCHC 33 5 g/dL      RDW 11 8 %      MPV 9 1 fL      Platelets 887 Thousands/uL      nRBC 0 /100 WBCs      Neutrophils Relative 62 %      Immat GRANS % 0 %      Lymphocytes Relative 25 %      Monocytes Relative 11 %      Eosinophils Relative 2 %      Basophils Relative 0 %      Neutrophils Absolute 4 36 Thousands/µL      Immature Grans Absolute 0 03 Thousand/uL      Lymphocytes Absolute 1 75 Thousands/µL      Monocytes Absolute 0 79 Thousand/µL      Eosinophils Absolute 0 12 Thousand/µL      Basophils Absolute 0 01 Thousands/µL                  CTA head and neck with and without contrast   Final Result by Jennifer Levy MD (02/11 1813)         1  No hemodynamically significant stenosis, dissection or occlusion of the carotid or vertebral arteries or major vessels of the [place]  2   No evidence of intracranial hemorrhage, infarct or mass effect  Workstation performed: [id number]         XR chest 2 views   ED Interpretation by Cayden Stapleton DO (02/11 1605)   No infiltrates  No pneumothorax  Final Result by Shelly Castaneda MD (02/11 1650)      No acute cardiopulmonary disease              Workstation performed: [id number]                    Procedures  ECG 12 Lead Documentation Only  Date/Time: 2/11/2020 4:48 PM  Performed by: Cayden Stapleton DO  Authorized by: Cayden Stapleton DO     ECG reviewed by me, the ED Provider: yes    Patient location:  ED  Previous ECG:     Previous ECG:  Unavailable    Comparison to cardiac monitor: Yes    Comments:      Sinus bradycardia rate of 59 beats per minute  Normal intervals  Normal axis  Normal QRS  No ST T wave abnormalities  No old for comparison  ECG 12 Lead Documentation Only  Date/Time: 2/11/2020 7:30 PM  Performed by: Benigno Chavez DO  Authorized by: Benigno Chavez DO     ECG reviewed by me, the ED Provider: yes    Patient location:  ED  Previous ECG:     Previous ECG:  Compared to current    Similarity:  No change    Comparison to cardiac monitor: Yes    Comments:      Normal sinus rhythm at a rate of 59 beats per minute  Normal intervals  Normal Axis  Early R-wave progression  No ST T wave abnormalities  Similar to previous EKG at 4:32 p m  [facility] ED Course  ED Course as of Feb 11 2031   e Feb 11, 2020   1723 RUE /86  LUE /83      1743 Patient in absolutely no acute distress  Discussed results of labs  Awaiting results of CTA  MDM  Number of Diagnoses or Management Options  Acute myocarditis, unspecified myocarditis type: new and requires workup  Diagnosis management comments: Patient presents with chest pain radiating into his neck  Symptoms have been intermittent for 2 years but worsened today  He also felt as though his left upper extremity was weaker than the right  Bilateral upper extremity blood pressures similar  EKG reveals no evidence of acute ischemia or pericarditis  Troponin is significantly elevated  CTA head and negative for acute dissection  Suspect myocarditis    Will hospitalize for serial troponins and echocardiogram        Amount and/or Complexity of Data Reviewed  Clinical lab tests: ordered and reviewed  Tests in the radiology section of CPT®: ordered and reviewed  Tests in the medicine section of CPT®: ordered and reviewed  Review and summarize past medical records: yes  Discuss the patient with other providers: yes  Independent visualization of images, tracings, or specimens: yes    Risk of Complications, Morbidity, and/or Mortality  Presenting problems: high  Diagnostic procedures: high  Management options: moderate    Patient Progress  Patient progress: stable        Disposition  Final diagnoses:   Acute myocarditis, unspecified myocarditis type     Time reflects when diagnosis was documented in both MDM as applicable and the Disposition within this note     Time User Action Codes Description Comment    2/11/2020  6:56 PM Paramjit Wade [I40 9] Acute myocarditis, unspecified myocarditis type       ED Disposition     ED Disposition Condition Date/Time Comment    Admit Stable Tue Feb 11, 2020  6:56 PM Case was discussed with HOLLY and the patient's admission status was agreed to be Admission Status: inpatient status to the service of Dr Ruben Jolley   Follow-up Information    None         Current Discharge Medication List      CONTINUE these medications which have NOT CHANGED    Details   traZODone (DESYREL) 50 mg tablet TAKE 1-2 TABLETS ( MG TOTAL) BY MOUTH DAILY AT BEDTIME  Qty: 60 tablet, Refills: 1    Associated Diagnoses: Primary insomnia      escitalopram (LEXAPRO) 10 mg tablet Take 1 tablet (10 mg total) by mouth daily  Qty: 90 tablet, Refills: 1    Associated Diagnoses: TROY (generalized anxiety disorder)           No discharge procedures on file      PDMP Review     None          ED Provider  Electronically Signed by           Omi Flynn DO  02/11/20 2031

## 2020-02-11 NOTE — LETTER
[address] [facility] [address]  Dept: [phone]    February 14, 2020     Patient: Alisha Benito   YOB: 1995   Date of Visit: 2/11/2020       To Whom it May Concern:    Brett Steele is under my professional care  He was seen in the hospital from 2/11/2020   to 02/14/20  He may return to work on Wednesday the 19th of february  with the following limitations You must refrain from any work involving strenuous excercise       If you have any questions or concerns, please don't hesitate to call           Sincerely,          Serene Avendaño MD

## 2020-02-12 ENCOUNTER — APPOINTMENT (INPATIENT)
Dept: MRI IMAGING | Facility: HOSPITAL | Age: 25
DRG: 315 | End: 2020-02-12
Payer: COMMERCIAL

## 2020-02-12 ENCOUNTER — APPOINTMENT (INPATIENT)
Dept: NON INVASIVE DIAGNOSTICS | Facility: HOSPITAL | Age: 25
DRG: 315 | End: 2020-02-12
Payer: COMMERCIAL

## 2020-02-12 LAB
ANION GAP SERPL CALCULATED.3IONS-SCNC: 10 MMOL/L (ref 4–13)
BASOPHILS # BLD AUTO: 0.02 THOUSANDS/ΜL (ref 0–0.1)
BASOPHILS NFR BLD AUTO: 0 % (ref 0–1)
BUN SERPL-MCNC: 13 MG/DL (ref 5–25)
CALCIUM SERPL-MCNC: 9.1 MG/DL (ref 8.3–10.1)
CHLORIDE SERPL-SCNC: 105 MMOL/L (ref 100–108)
CO2 SERPL-SCNC: 27 MMOL/L (ref 21–32)
CREAT SERPL-MCNC: 0.94 MG/DL (ref 0.6–1.3)
EOSINOPHIL # BLD AUTO: 0.19 THOUSAND/ΜL (ref 0–0.61)
EOSINOPHIL NFR BLD AUTO: 3 % (ref 0–6)
ERYTHROCYTE [DISTWIDTH] IN BLOOD BY AUTOMATED COUNT: 11.7 % (ref 11.6–15.1)
FLUAV RNA NPH QL NAA+PROBE: NORMAL
FLUBV RNA NPH QL NAA+PROBE: NORMAL
GFR SERPL CREATININE-BSD FRML MDRD: 114 ML/MIN/1.73SQ M
GLUCOSE SERPL-MCNC: 102 MG/DL (ref 65–140)
HCT VFR BLD AUTO: 39.2 % (ref 36.5–49.3)
HGB BLD-MCNC: 13.3 G/DL (ref 12–17)
IMM GRANULOCYTES # BLD AUTO: 0.01 THOUSAND/UL (ref 0–0.2)
IMM GRANULOCYTES NFR BLD AUTO: 0 % (ref 0–2)
LYMPHOCYTES # BLD AUTO: 2.63 THOUSANDS/ΜL (ref 0.6–4.47)
LYMPHOCYTES NFR BLD AUTO: 42 % (ref 14–44)
MCH RBC QN AUTO: 29.4 PG (ref 26.8–34.3)
MCHC RBC AUTO-ENTMCNC: 33.9 G/DL (ref 31.4–37.4)
MCV RBC AUTO: 87 FL (ref 82–98)
MONOCYTES # BLD AUTO: 0.74 THOUSAND/ΜL (ref 0.17–1.22)
MONOCYTES NFR BLD AUTO: 12 % (ref 4–12)
NEUTROPHILS # BLD AUTO: 2.65 THOUSANDS/ΜL (ref 1.85–7.62)
NEUTS SEG NFR BLD AUTO: 43 % (ref 43–75)
NRBC BLD AUTO-RTO: 0 /100 WBCS
PLATELET # BLD AUTO: 243 THOUSANDS/UL (ref 149–390)
PMV BLD AUTO: 9.7 FL (ref 8.9–12.7)
POTASSIUM SERPL-SCNC: 3.9 MMOL/L (ref 3.5–5.3)
RBC # BLD AUTO: 4.53 MILLION/UL (ref 3.88–5.62)
RSV RNA NPH QL NAA+PROBE: NORMAL
SODIUM SERPL-SCNC: 142 MMOL/L (ref 136–145)
TROPONIN I SERPL-MCNC: 10.88 NG/ML
TSH SERPL DL<=0.05 MIU/L-ACNC: 3.18 UIU/ML (ref 0.36–3.74)
WBC # BLD AUTO: 6.24 THOUSAND/UL (ref 4.31–10.16)

## 2020-02-12 PROCEDURE — 84443 ASSAY THYROID STIM HORMONE: CPT | Performed by: NURSE PRACTITIONER

## 2020-02-12 PROCEDURE — 99232 SBSQ HOSP IP/OBS MODERATE 35: CPT | Performed by: INTERNAL MEDICINE

## 2020-02-12 PROCEDURE — 99254 IP/OBS CNSLTJ NEW/EST MOD 60: CPT | Performed by: INTERNAL MEDICINE

## 2020-02-12 PROCEDURE — 75561 CARDIAC MRI FOR MORPH W/DYE: CPT

## 2020-02-12 PROCEDURE — A9585 GADOBUTROL INJECTION: HCPCS | Performed by: INTERNAL MEDICINE

## 2020-02-12 PROCEDURE — 80048 BASIC METABOLIC PNL TOTAL CA: CPT | Performed by: PHYSICIAN ASSISTANT

## 2020-02-12 PROCEDURE — 87631 RESP VIRUS 3-5 TARGETS: CPT | Performed by: NURSE PRACTITIONER

## 2020-02-12 PROCEDURE — 93005 ELECTROCARDIOGRAM TRACING: CPT

## 2020-02-12 PROCEDURE — 93306 TTE W/DOPPLER COMPLETE: CPT

## 2020-02-12 PROCEDURE — 84484 ASSAY OF TROPONIN QUANT: CPT | Performed by: PHYSICIAN ASSISTANT

## 2020-02-12 PROCEDURE — 85025 COMPLETE CBC W/AUTO DIFF WBC: CPT | Performed by: PHYSICIAN ASSISTANT

## 2020-02-12 RX ORDER — IBUPROFEN 600 MG/1
600 TABLET ORAL EVERY 8 HOURS SCHEDULED
Status: DISCONTINUED | OUTPATIENT
Start: 2020-02-12 | End: 2020-02-13

## 2020-02-12 RX ORDER — COLCHICINE 0.6 MG/1
0.6 TABLET ORAL ONCE
Status: COMPLETED | OUTPATIENT
Start: 2020-02-12 | End: 2020-02-12

## 2020-02-12 RX ORDER — COLCHICINE 0.6 MG/1
0.6 TABLET ORAL DAILY
Status: DISCONTINUED | OUTPATIENT
Start: 2020-02-12 | End: 2020-02-14 | Stop reason: HOSPADM

## 2020-02-12 RX ORDER — ASPIRIN 81 MG/1
324 TABLET, CHEWABLE ORAL ONCE
Status: DISCONTINUED | OUTPATIENT
Start: 2020-02-12 | End: 2020-02-12 | Stop reason: SDUPTHER

## 2020-02-12 RX ORDER — ASPIRIN 325 MG
325 TABLET ORAL ONCE
Status: COMPLETED | OUTPATIENT
Start: 2020-02-12 | End: 2020-02-12

## 2020-02-12 RX ORDER — ASPIRIN 81 MG/1
324 TABLET, CHEWABLE ORAL ONCE
Status: COMPLETED | OUTPATIENT
Start: 2020-02-12 | End: 2020-02-12

## 2020-02-12 RX ADMIN — TRAZODONE HYDROCHLORIDE 50 MG: 50 TABLET ORAL at 21:47

## 2020-02-12 RX ADMIN — COLCHICINE 0.6 MG: 0.6 TABLET, FILM COATED ORAL at 12:00

## 2020-02-12 RX ADMIN — COLCHICINE 0.6 MG: 0.6 TABLET, FILM COATED ORAL at 16:16

## 2020-02-12 RX ADMIN — GADOBUTROL 18 ML: 604.72 INJECTION INTRAVENOUS at 21:45

## 2020-02-12 RX ADMIN — IBUPROFEN 600 MG: 600 TABLET ORAL at 21:41

## 2020-02-12 RX ADMIN — ASPIRIN 325 MG ORAL TABLET 325 MG: 325 PILL ORAL at 10:35

## 2020-02-12 RX ADMIN — ASPIRIN 81 MG 324 MG: 81 TABLET ORAL at 09:16

## 2020-02-12 RX ADMIN — ACETAMINOPHEN 650 MG: 325 TABLET, FILM COATED ORAL at 18:17

## 2020-02-12 RX ADMIN — IBUPROFEN 600 MG: 600 TABLET ORAL at 11:59

## 2020-02-12 NOTE — UTILIZATION REVIEW
Initial Clinical Review    Admission: Date/Time/Statement: Admission Orders (From admission, onward)     Ordered        02/11/20 1857  Inpatient Admission  Once                   Orders Placed This Encounter   Procedures    Inpatient Admission     Standing Status:   Standing     Number of Occurrences:   1     Order Specific Question:   Admitting Physician     Answer:   Luh Hightower [[id number]]     Order Specific Question:   Level of Care     Answer:   Med Surg [[id number]]     Order Specific Question:   Estimated length of stay     Answer:   More than 2 Midnights     Order Specific Question:   Certification     Answer:   I certify that inpatient services are medically necessary for this patient for a duration of greater than two midnights  See H&P and MD Progress Notes for additional information about the patient's course of treatment  ED Arrival Information     Expected Arrival Acuity Means of Arrival Escorted By Service Admission Type    - 2/11/2020 15:28 Urgent [facility] Urgent    Arrival Complaint    neck pain; chest pain        Chief Complaint   Patient presents with    Neck Pain     per pt "he started with an onset chest pain with some SOB, pain in neck begun first after he woke upwith the sob and the numbness, "    Chest Pain     Assessment/Plan:   23 yo male presented to ER from home as inpatient admission for chest pain  Patient states woke up with left anterior neck pain radiating into th left chest, palpitations with some SOB  Patient states over past few days increase weakness  As per patient had a viral gastroenteritis other wise healthy  Plan telemetry monitor troponins consult cardiology and supportive care As per cardiology  acute myopericarditis start ibuprofen TIB and colchicine       ED Triage Vitals   Temperature Pulse Respirations Blood Pressure SpO2   02/11/20 1548 02/11/20 1546 02/11/20 1546 02/11/20 1546 02/11/20 1546   99 2 °F (37 3 °C) 60 18 144/80 100 %      Temp Source Heart Rate Source Patient Position - Orthostatic VS BP Location FiO2 (%)   02/11/20 1546 02/11/20 1546 02/11/20 1546 02/11/20 1546 --   Oral Monitor Lying Right arm       Pain Score       02/11/20 1546       8        Wt Readings from Last 1 Encounters:   02/11/20 84 kg (185 lb 3 oz)     Additional Vital Signs:   Date/Time  Temp  Pulse  Resp  BP  MAP (mmHg)  SpO2  O2 Device  Patient Position - Orthostatic VS   02/12/20 1444  97 7 °F (36 5 °C)  65  16  135/62    99 %  None (Room air)  Sitting   02/12/20 0836        142/91           02/12/20 0700  97 5 °F (36 4 °C)  65  18  115/65    99 %  None (Room air)  Lying   02/11/20 2152  99 5 °F (37 5 °C)  67  18  125/71    98 %  None (Room air)  Lying   02/11/20 2001  99 2 °F (37 3 °C)  66  18  122/70    98 %  None (Room air)  Lying   02/11/20 1930    66  17  120/59  81  97 %  None (Room air)  Sitting   02/11/20 1821    86  19  136/77    99 %  None (Room air)  Lying   02/11/20 1646    66  19  137/63    98 %    Lying   02/11/20 1548  99 2 °F (37 3 °C)                     Pertinent Labs/Diagnostic Test Results:   Results from last 7 days   Lab Units 02/12/20  0428 02/11/20  1622   WBC Thousand/uL 6 24 7 06   HEMOGLOBIN g/dL 13 3 13 7   HEMATOCRIT % 39 2 40 9   PLATELETS Thousands/uL 243 235   NEUTROS ABS Thousands/µL 2 65 4 36         Results from last 7 days   Lab Units 02/12/20  0428 02/11/20  1622   SODIUM mmol/L 142 142   POTASSIUM mmol/L 3 9 3 4*   CHLORIDE mmol/L 105 102   CO2 mmol/L 27 31   ANION GAP mmol/L 10 9   BUN mg/dL 13 11   CREATININE mg/dL 0 94 0 93   EGFR ml/min/1 73sq m 114 115   CALCIUM mg/dL 9 1 9 5   MAGNESIUM mg/dL  --  1 8     Results from last 7 days   Lab Units 02/11/20  1622   AST U/L 80*   ALT U/L 45   ALK PHOS U/L 92   TOTAL PROTEIN g/dL 7 7   ALBUMIN g/dL 3 8   TOTAL BILIRUBIN mg/dL 0 25         Results from last 7 days   Lab Units 02/12/20  0428 02/11/20  1622   GLUCOSE RANDOM mg/dL 102 101     Results from last 7 days   Lab Units 02/12/20  0158 02/11/20  2227 02/11/20  1925 02/11/20  1622   TROPONIN I ng/mL 10 88* 14 30* 16 20* 13 48*     Results from last 7 days   Lab Units 02/12/20  0428   TSH 3RD GENERATON uIU/mL 3 179     Results from last 7 days   Lab Units 02/11/20  1622   CRP mg/L >90 0*   SED RATE mm/hour 30*       Results from last 7 days   Lab Units 02/12/20  1200   INFLUENZA A PCR  None Detected   INFLUENZA B PCR  None Detected   RSV PCR  None Detected           CTA head and neck 02-11-20       1  No hemodynamically significant stenosis, dissection or occlusion of the carotid or vertebral arteries or major vessels of the [place]  2   No evidence of intracranial hemorrhage, infarct or mass effect          CXR 02-11-20  NAD  EKG 02-11-20@ 04:48   Sinus bradycardia rate of 59 beats per minute  Normal intervals  Normal axis  Normal QRS  No ST T wave abnormalities  No old for comparison  EKG 02-11-20 @ 0730      Normal sinus rhythm at a rate of 59 beats per minute  Normal intervals  Normal Axis  Early R-wave progression  No ST T wave abnormalities  Similar to previous EKG at 4:32 p m              ED Treatment:   Medication Administration from 02/11/2020 1528 to 02/11/2020 1939       Date/Time Order Dose Route Action Action by Comments     02/11/2020 1703 iohexol (OMNIPAQUE) 350 MG/ML injection (MULTI-DOSE) 85 mL 85 mL Intravenous Given [facility]       02/11/2020 1907 ketorolac (TORADOL) injection 15 mg 15 mg Intravenous Given Yessica Ac RN         History reviewed  No pertinent past medical history    Present on Admission:   Other chest pain   TROY (generalized anxiety disorder)   Hypokalemia      Admitting Diagnosis: Neck pain [M54 2]  Chest pain, unspecified [R07 9]  Acute myocarditis, unspecified myocarditis type [I40 9]  Age/Sex: 22 y o  male  Admission Orders:  Scheduled Medications:    Medications:  colchicine 0 6 mg Oral Once   colchicine 0 6 mg Oral Daily   ibuprofen 600 mg Oral [facility] traZODone 50 mg Oral HS     Continuous IV Infusions:    sodium chloride 125 mL/hr Intravenous Continuous     PRN Meds:    acetaminophen 650 mg Oral Q6H PRN   ketorolac 15 mg Intravenous Q6H PRN   ketorolac 30 mg Intravenous Q6H PRN   ondansetron 4 mg Intravenous Q6H PRN       IP CONSULT TO CARDIOLOGY   ECHO pending  Telemetry  MRI cardiac pending    Network Utilization Review Department  [email or web address]  ATTENTION: Please call with any questions or concerns to [phone] and carefully listen to the prompts so that you are directed to the right person  All voicemails are confidential   Sheridan Rodriges all requests for admission clinical reviews, approved or denied determinations and any other requests to dedicated fax number below belonging to the campus where the patient is receiving treatment   List of dedicated fax numbers for the Facilities:  [address] DENIALS (Administrative/Medical Necessity) [phone]   [address] (Maternity/NICU/Pediatrics) [phone]   Dilia Newman [phone]   Sathya Sauceda [phone]   [facility] [phone]   145 [facility]  [phone]   [address] [facility] [address] [place] [address] [phone]   [facility]  [phone]   [address] [facility] [address]  [address] [facility] [address] [facility] [phone]

## 2020-02-12 NOTE — PROGRESS NOTES
Progress Note - Kashmir Levine 1995, 22 y o  male MRN: [id number]    Unit/Bed#: S -01 Encounter: [id number]    Primary Care Provider: Ramesh Azevedo MD   Date and time admitted to hospital: 2020  3:39 PM        * Other chest pain  Assessment & Plan  This morning having CP again  8/10, radiating to left arm and left neck  Also with ST changes new when compared with prior  Discussed with cardiology  They will assess today   Echo pending  Lipid profile  No substantial risk factors for myocardial infarction secondary to CAD, and he is very young  Likely viral pericarditis/myocarditis  Troponins peaked  Will continue to trend  VTE Pharmacologic Prophylaxis:   Pharmacologic: Heparin  Mechanical VTE Prophylaxis in Place: Yes    Patient Centered Rounds: I have performed bedside rounds with nursing staff today  Discussions with Specialists or Other Care Team Provider: Discussed with cardiology  Education and Discussions with Family / Patient: Discussed with mother, father and SO  Time Spent for Care: 30 minutes  More than 50% of total time spent on counseling and coordination of care as described above  Current Length of Stay: 1 day(s)    Current Patient Status: Inpatient   Certification Statement: The patient will continue to require additional inpatient hospital stay due to chest pain and troponins elevation  Discharge Plan: unclear  Code Status: Level 1 - Full Code      Subjective:   atie    Objective:     Vitals:   Temp (24hrs), Av 9 °F (37 2 °C), Min:97 5 °F (36 4 °C), Max:99 5 °F (37 5 °C)    Temp:  [97 5 °F (36 4 °C)-99 5 °F (37 5 °C)] 97 5 °F (36 4 °C)  HR:  [60-86] 65  Resp:  [17-19] 18  BP: (115-144)/(59-91) 142/91  SpO2:  [97 %-100 %] 99 %  Body mass index is 28 16 kg/m²       Input and Output Summary (last 24 hours):     No intake or output data in the 24 hours ending 20 0858    Physical Exam:     Physical Exam   Constitutional: He appears well-developed  No distress  HENT:   Head: Normocephalic  Mouth/Throat: No oropharyngeal exudate  Eyes: Right eye exhibits no discharge  Left eye exhibits no discharge  No scleral icterus  Neck: No JVD present  No tracheal deviation present  No thyromegaly present  Cardiovascular: Normal rate  Exam reveals no friction rub  No murmur heard  Pulmonary/Chest: Effort normal  No stridor  No respiratory distress  He has no wheezes  He has no rales  He exhibits no tenderness  Abdominal: Soft  He exhibits no distension and no mass  There is no tenderness  There is no rebound and no guarding  No hernia  Musculoskeletal: He exhibits no edema, tenderness or deformity  Lymphadenopathy:     He has no cervical adenopathy  Neurological: He is alert  He displays normal reflexes  No cranial nerve deficit or sensory deficit  He exhibits normal muscle tone  Coordination normal    Skin: Capillary refill takes less than 2 seconds  No rash noted  He is not diaphoretic  No erythema  No pallor  Psychiatric: He has a normal mood and affect  Additional Data:     Labs:    Results from last 7 days   Lab Units 02/12/20  0428   WBC Thousand/uL 6 24   HEMOGLOBIN g/dL 13 3   HEMATOCRIT % 39 2   PLATELETS Thousands/uL 243   NEUTROS PCT % 43   LYMPHS PCT % 42   MONOS PCT % 12   EOS PCT % 3     Results from last 7 days   Lab Units 02/12/20  0428 02/11/20  1622   SODIUM mmol/L 142 142   POTASSIUM mmol/L 3 9 3 4*   CHLORIDE mmol/L 105 102   CO2 mmol/L 27 31   BUN mg/dL 13 11   CREATININE mg/dL 0 94 0 93   ANION GAP mmol/L 10 9   CALCIUM mg/dL 9 1 9 5   ALBUMIN g/dL  --  3 8   TOTAL BILIRUBIN mg/dL  --  0 25   ALK PHOS U/L  --  92   ALT U/L  --  45   AST U/L  --  80*   GLUCOSE RANDOM mg/dL 102 101                           * I Have Reviewed All Lab Data Listed Above  * Additional Pertinent Lab Tests Reviewed:  All Labs For Current Hospital Admission Reviewed    Imaging:    Imaging Reports Reviewed Today Include: Imaging Personally Reviewed by Myself Includes:  None pertinent to this encounter  Recent Cultures (last 7 days):           Last 24 Hours Medication List:     Current Facility-Administered Medications:  acetaminophen 650 mg Oral Q6H PRN Waynetta Body, PA-C    ketorolac 15 mg Intravenous Q6H PRN Waynetta Body, PA-C    ketorolac 30 mg Intravenous Q6H PRN Waynetta Body, PA-C    ondansetron 4 mg Intravenous Q6H PRN Waynetta Body, PA-C    sodium chloride 125 mL/hr Intravenous Continuous Waynetta Body, PA-C Last Rate: 125 mL/hr (02/11/20 2148)   traZODone 50 mg Oral HS Waynetta Body, PA-C         Today, Patient Was Seen By: Juventino Garcia MD    ** Please Note: Dictation voice to text software may have been used in the creation of this document   **

## 2020-02-12 NOTE — CONSULTS
Cardiology   Randall Maya 22 y o  male MRN: [id number]  Unit/Bed#: S -01 Encounter: [id number]      Reason for Consult / Principal Problem:  Suspected myocarditis    Physician Requesting Consult:  Allison Callahan MD    Cardiologist: N/A    PCP: Dr Tahir Clark  1  Suspect acute viral myopericarditis  -Pt presented to the ED yesterday with intermittent severe midsternal chest pain with radiation to his left neck and down his left arm  -12 Lead ECG 2/11/2020: Sinus bradycardia, rate 59 bpm, with no significant ST/T-wave abnormalities   -Troponin x 4:  13 48 --16 2--14 3--10 8  -C-Reactive >90    -2/12/2020 a m; Pt complaining of severe midsternal chest pain with radiation to his left neck and down his left arm; a 12 lead ECG obtained which demonstrated SB, diffuse concave ST elevations, with slightly depressed MT Interval      Plan  -Obtain 2D echocardiogram--will assess for RWA, cardiomyopathy, valvular heart disease etc  -Cardiac MRI  -Can give 650 mg of aspirin now  -R/o flu A/B  -Obtain TSH level  -Monitor electrolytes closely  -Monitor on telem    HPI: Randall Maya 22y o  year old male with no significant medical history  He denies tobacco use, occasional alcohol use, and denies recreational drug use  He does take over-the-counter pre workout supplements  He does follow routinely with a primary care provider  Over the past week, the patient has been dealing with suspected viral gastroenteritis with symptoms of nausea vomiting and diarrhea  He presented to the [facility]2020 with complaints of intense mid sternal chest pain with radiation up into had the left side of his neck and down his left arm  His initial troponin was 13 48 on admission  CRP >90  12 Lead ECG demonstrated sinus bradycardia with no significant ST/T-wave abnormalities  It was suspected that the patient's presentation was consistent with acute myocarditis  Further workup on admission  Hemodynamics on admission  Temp 99 2° F, HR 60, RR 18, /80, sat 100% on room air  Laboratory data on admission  NA +142, K +3 4, chloride 102, CO2 31, anion gap 9, BUN 11, creatinine 0 93, glucose 101, calcium 9 5, AST 80, ALT 45, alk-phos 92, albumin 3 8, T bili 0 25, magnesium 1 8, WBC 7 1, HGB 13 7, platelet count 508  Troponin x4 13 48--16 2--14 3--10 8  Imaging  CTA head and neck no significant stenosis , dissection or occlusion of the carotid or vertebral arteries, no evidence of intracranial hemorrhage infarct or mass effect     He was admitted to the [facility] telemetry unit for closer hemodynamic monitoring and cardiology consulted for further treatment recommendations/management  Family History:   Family History   Problem Relation Age of Onset    No Known Problems Mother     No Known Problems Father     ADD / ADHD Sister      Historical Information   History reviewed  No pertinent past medical history  History reviewed  No pertinent surgical history  Social History   Social History     Substance and Sexual Activity   Alcohol Use Yes    Comment: Socially     Social History     Substance and Sexual Activity   Drug Use No     Social History     Tobacco Use   Smoking Status Current Some Day Smoker   Smokeless Tobacco Never Used     Family History:   Family History   Problem Relation Age of Onset    No Known Problems Mother     No Known Problems Father     ADD / ADHD Sister        Review of Systems:  Review of Systems   Constitutional: Negative for activity change, appetite change, chills, diaphoresis, fatigue and fever  HENT: Negative for congestion  Respiratory: Negative for cough and chest tightness  Cardiovascular: Positive for chest pain  left neck/left arm pain   Gastrointestinal: Negative for abdominal pain, constipation, diarrhea and nausea  Genitourinary: Negative for difficulty urinating and dysuria     Musculoskeletal: Negative for arthralgias and myalgias  Skin: Negative for color change  Neurological: Negative for dizziness, light-headedness and headaches  All other systems reviewed and are negative  Scheduled Meds:  Current Facility-Administered Medications:  acetaminophen 650 mg Oral Q6H PRN Deyanne Likens, PA-C    aspirin 324 mg Oral Once Negin Marquez MD    aspirin 324 mg Oral Once Negin Marquez MD    ketorolac 15 mg Intravenous Q6H PRN Deyanne Likens, PA-C    ketorolac 30 mg Intravenous Q6H PRN Deyanne Likens, PA-C    ondansetron 4 mg Intravenous Q6H PRN Deyanne Likens, PA-C    sodium chloride 125 mL/hr Intravenous Continuous Deyanne Likens, PA-C Last Rate: 125 mL/hr (02/11/20 2148)   traZODone 50 mg Oral HS Deyanne Likens, PA-C      Continuous Infusions:  sodium chloride 125 mL/hr Last Rate: 125 mL/hr (02/11/20 2148)     PRN Meds:   acetaminophen    ketorolac    ketorolac    ondansetron  all current active meds have been reviewed, current meds:   Current Facility-Administered Medications   Medication Dose Route Frequency    acetaminophen (TYLENOL) tablet 650 mg  650 mg Oral Q6H PRN    aspirin chewable tablet 324 mg  324 mg Oral Once    aspirin chewable tablet 324 mg  324 mg Oral Once    ketorolac (TORADOL) injection 15 mg  15 mg Intravenous Q6H PRN    ketorolac (TORADOL) injection 30 mg  30 mg Intravenous Q6H PRN    ondansetron (ZOFRAN) injection 4 mg  4 mg Intravenous Q6H PRN    sodium chloride 0 9 % infusion  125 mL/hr Intravenous Continuous    traZODone (DESYREL) tablet 50 mg  50 mg Oral HS    and PTA meds:   Prior to Admission Medications   Prescriptions Last Dose Informant Patient Reported?  Taking?   escitalopram (LEXAPRO) 10 mg tablet Not Taking at Unknown time  No No   Sig: Take 1 tablet (10 mg total) by mouth daily   Patient not taking: Reported on 2/11/2020   traZODone (DESYREL) 50 mg tablet 2/10/2020 at Unknown time  No Yes   Sig: TAKE 1-2 TABLETS ( MG TOTAL) BY MOUTH DAILY AT BEDTIME      Facility-Administered Medications: None       No Known Allergies    Objective   Vitals: Blood pressure 142/91, pulse 65, temperature 97 5 °F (36 4 °C), temperature source Oral, resp  rate 18, height 5' 8" (1 727 m), weight 84 kg (185 lb 3 oz), SpO2 99 %  , Body mass index is 28 16 kg/m² , Orthostatic Blood Pressures      Most Recent Value   Blood Pressure  142/91 filed at 02/12/2020 4031   Patient Position - Orthostatic VS  Lying filed at 02/12/2020 0700          No intake or output data in the 24 hours ending 02/12/20 0905    Invasive Devices     Peripheral Intravenous Line            Peripheral IV 02/11/20 Right Antecubital less than 1 day              Physical Exam:  Physical Exam   Constitutional: He appears well-developed and well-nourished  No distress  HENT:   Head: Normocephalic and atraumatic  Mouth/Throat: Oropharynx is clear and moist  No oropharyngeal exudate  Eyes: No scleral icterus  Neck: No JVD present  Cardiovascular: Exam reveals no friction rub  No murmur heard  Sinus bradycardia, HR low 50's   Pulmonary/Chest: Effort normal and breath sounds normal  He has no wheezes  He has no rales  Abdominal: Soft  Bowel sounds are normal    Musculoskeletal: Normal range of motion  He exhibits no edema  Neurological: He is alert  Skin: Skin is warm and dry  Capillary refill takes less than 2 seconds  He is not diaphoretic  Psychiatric: He has a normal mood and affect  Nursing note and vitals reviewed        Lab Results:   Recent Results (from the past 24 hour(s))   CBC and differential    Collection Time: 02/11/20  4:22 PM   Result Value Ref Range    WBC 7 06 4 31 - 10 16 Thousand/uL    RBC 4 69 3 88 - 5 62 Million/uL    Hemoglobin 13 7 12 0 - 17 0 g/dL    Hematocrit 40 9 36 5 - 49 3 %    MCV 87 82 - 98 fL    MCH 29 2 26 8 - 34 3 pg    MCHC 33 5 31 4 - 37 4 g/dL    RDW 11 8 11 6 - 15 1 %    MPV 9 1 8 9 - 12 7 fL    Platelets 443 041 - 733 Thousands/uL    nRBC 0 /100 WBCs Neutrophils Relative 62 43 - 75 %    Immat GRANS % 0 0 - 2 %    Lymphocytes Relative 25 14 - 44 %    Monocytes Relative 11 4 - 12 %    Eosinophils Relative 2 0 - 6 %    Basophils Relative 0 0 - 1 %    Neutrophils Absolute 4 36 1 85 - 7 62 Thousands/µL    Immature Grans Absolute 0 03 0 00 - 0 20 Thousand/uL    Lymphocytes Absolute 1 75 0 60 - 4 47 Thousands/µL    Monocytes Absolute 0 79 0 17 - 1 22 Thousand/µL    Eosinophils Absolute 0 12 0 00 - 0 61 Thousand/µL    Basophils Absolute 0 01 0 00 - 0 10 Thousands/µL   Comprehensive metabolic panel    Collection Time: 02/11/20  4:22 PM   Result Value Ref Range    Sodium 142 136 - 145 mmol/L    Potassium 3 4 (L) 3 5 - 5 3 mmol/L    Chloride 102 100 - 108 mmol/L    CO2 31 21 - 32 mmol/L    ANION GAP 9 4 - 13 mmol/L    BUN 11 5 - 25 mg/dL    Creatinine 0 93 0 60 - 1 30 mg/dL    Glucose 101 65 - 140 mg/dL    Calcium 9 5 8 3 - 10 1 mg/dL    AST 80 (H) 5 - 45 U/L    ALT 45 12 - 78 U/L    Alkaline Phosphatase 92 46 - 116 U/L    Total Protein 7 7 6 4 - 8 2 g/dL    Albumin 3 8 3 5 - 5 0 g/dL    Total Bilirubin 0 25 0 20 - 1 00 mg/dL    eGFR 115 ml/min/1 73sq m   Troponin I    Collection Time: 02/11/20  4:22 PM   Result Value Ref Range    Troponin I 13 48 (H) <=0 04 ng/mL   Sedimentation rate, automated    Collection Time: 02/11/20  4:22 PM   Result Value Ref Range    Sed Rate 30 (H) 0 - 10 mm/hour   C-reactive protein    Collection Time: 02/11/20  4:22 PM   Result Value Ref Range    CRP >90 0 (H) <3 0 mg/L   Magnesium    Collection Time: 02/11/20  4:22 PM   Result Value Ref Range    Magnesium 1 8 1 6 - 2 6 mg/dL   Troponin I    Collection Time: 02/11/20  7:25 PM   Result Value Ref Range    Troponin I 16 20 (H) <=0 04 ng/mL   Troponin I    Collection Time: 02/11/20 10:27 PM   Result Value Ref Range    Troponin I 14 30 (H) <=0 04 ng/mL   Troponin I    Collection Time: 02/12/20  1:58 AM   Result Value Ref Range    Troponin I 10 88 (H) <=0 04 ng/mL   Basic metabolic panel Collection Time: 02/12/20  4:28 AM   Result Value Ref Range    Sodium 142 136 - 145 mmol/L    Potassium 3 9 3 5 - 5 3 mmol/L    Chloride 105 100 - 108 mmol/L    CO2 27 21 - 32 mmol/L    ANION GAP 10 4 - 13 mmol/L    BUN 13 5 - 25 mg/dL    Creatinine 0 94 0 60 - 1 30 mg/dL    Glucose 102 65 - 140 mg/dL    Calcium 9 1 8 3 - 10 1 mg/dL    eGFR 114 ml/min/1 73sq m   CBC and differential    Collection Time: 02/12/20  4:28 AM   Result Value Ref Range    WBC 6 24 4 31 - 10 16 Thousand/uL    RBC 4 53 3 88 - 5 62 Million/uL    Hemoglobin 13 3 12 0 - 17 0 g/dL    Hematocrit 39 2 36 5 - 49 3 %    MCV 87 82 - 98 fL    MCH 29 4 26 8 - 34 3 pg    MCHC 33 9 31 4 - 37 4 g/dL    RDW 11 7 11 6 - 15 1 %    MPV 9 7 8 9 - 12 7 fL    Platelets 651 190 - 291 Thousands/uL    nRBC 0 /100 WBCs    Neutrophils Relative 43 43 - 75 %    Immat GRANS % 0 0 - 2 %    Lymphocytes Relative 42 14 - 44 %    Monocytes Relative 12 4 - 12 %    Eosinophils Relative 3 0 - 6 %    Basophils Relative 0 0 - 1 %    Neutrophils Absolute 2 65 1 85 - 7 62 Thousands/µL    Immature Grans Absolute 0 01 0 00 - 0 20 Thousand/uL    Lymphocytes Absolute 2 63 0 60 - 4 47 Thousands/µL    Monocytes Absolute 0 74 0 17 - 1 22 Thousand/µL    Eosinophils Absolute 0 19 0 00 - 0 61 Thousand/µL    Basophils Absolute 0 02 0 00 - 0 10 Thousands/µL     Imaging: I have personally reviewed pertinent reports  and I have personally reviewed pertinent films in PACS  Code Status: LVL 1 Full Code  Epic/ Allscripts/Care Everywhere records reviewed: Yes    * Please Note: Fluency DirectDictation voice to text software may have been used in the creation of this document   **

## 2020-02-12 NOTE — H&P
H&P- Alix Gee 1995, 22 y o  male MRN: [id number]  Unit/Bed#: S -01 Encounter: [id number]  Primary Care Provider: Kelly Ramires MD   Date and time admitted to hospital: 2/11/2020  3:39 PM    * Other chest pain  Assessment & Plan  · Patient has had intermittent chest pain occurring almost every day for the past 2 years  Has not had any formal workup, in the past was deemed to be anxiety  · Inflammatory markers are elevated  · Initial EKG is nonischemic  · Initial troponin 13, next troponin is 16   · Suspect acute myocarditis, patient was sick with viral gastroenteritis approximately 1 week ago  · Obtain echocardiogram, continue to monitor troponins, monitor on telemetry  · Pain control with NSAIDs  · Cardiology consultation  Hypokalemia  Assessment & Plan  · Mild, 3 4 upon admission  · Check magnesium level  · Replete monitor BMP  TROY (generalized anxiety disorder)  Assessment & Plan  · Continue trazodone q h s  VTE Prophylaxis: ambulation  / sequential compression device   Code Status: FULL CODE  POLST: POLST form is not discussed and not completed at this time  Discussion with family: patient, mother, father at bedside     Anticipated Length of Stay:  Patient will be admitted on an Inpatient basis with an anticipated length of stay of  > 2 midnights  Justification for Hospital Stay: chest pain with elevated troponins, suspect carditis    Total Time for Visit, including Counseling / Coordination of Care: 1 hour  Greater than 50% of this total time spent on direct patient counseling and coordination of care  Chief Complaint:   Chest pain     History of Present Illness:    Alix Gee is a 22 y o  male with no significant past medical history who presents with intermittent chest pain which is occurring almost every day for the past 2 years, however more severe starting yesterday    He reports that the pain started in the left side of his anterior neck, radiating into his chest   Consult to be short of breath at times  Reports over the past 2 years he has seen his family doctor, as well as urgent cares for this pain  It has been deemed to be anxiety, he has had normal electrocardiograms in the past   Has never had echocardiogram   No family history of any heart disease or congenital heart defects  Nothing makes the pain better or worse  Patient reports he was getting over a viral stomach bug last week, which caused him to have nausea, vomiting, as well as diarrhea  He does not report any fevers or chills  No new medications  Denies any IV drug use, recent travel outside of the country  He has had some pain relief with NSAIDs  Review of Systems:    Review of Systems   Constitutional: Negative for activity change, appetite change, chills, fatigue and fever  HENT: Negative for congestion, rhinorrhea, sinus pressure and sore throat  Eyes: Negative for photophobia, pain and visual disturbance  Respiratory: Negative for cough, shortness of breath and wheezing  Cardiovascular: Positive for chest pain and palpitations  Negative for leg swelling  Gastrointestinal: Negative for abdominal distention, abdominal pain, constipation, diarrhea, nausea and vomiting  Endocrine: Negative for cold intolerance, heat intolerance, polydipsia and polyuria  Genitourinary: Negative for difficulty urinating, dysuria, flank pain, frequency and hematuria  Musculoskeletal: Negative for arthralgias, back pain and joint swelling  Skin: Negative for color change, pallor and rash  Allergic/Immunologic: Negative  Neurological: Negative for dizziness, syncope, weakness, light-headedness and headaches  Hematological: Negative  Psychiatric/Behavioral: The patient is nervous/anxious  Past Medical and Surgical History:     History reviewed  No pertinent past medical history  History reviewed  No pertinent surgical history      Meds/Allergies:    Prior to Admission medications    Medication Sig Start Date End Date Taking? Authorizing Provider   traZODone (DESYREL) 50 mg tablet TAKE 1-2 TABLETS ( MG TOTAL) BY MOUTH DAILY AT BEDTIME 12/13/19  Yes Willie Mares MD   escitalopram (LEXAPRO) 10 mg tablet Take 1 tablet (10 mg total) by mouth daily  Patient not taking: Reported on 2/11/2020 10/16/19   Willie Mares MD     I have reviewed home medications with patient personally  Allergies: No Known Allergies    Social History:     Marital Status: Single   Occupation: non-contributory  Patient Pre-hospital Living Situation: home  Patient Pre-hospital Level of Mobility: full  Patient Pre-hospital Diet Restrictions: none  Substance Use History:   Social History     Substance and Sexual Activity   Alcohol Use Yes    Comment: Socially     Social History     Tobacco Use   Smoking Status Current Some Day Smoker   Smokeless Tobacco Never Used     Social History     Substance and Sexual Activity   Drug Use No       Family History:    Family History   Problem Relation Age of Onset    No Known Problems Mother     No Known Problems Father     ADD / ADHD Sister        Physical Exam:     Vitals:   Blood Pressure: 122/70 (02/11/20 2001)  Pulse: 66 (02/11/20 2001)  Temperature: 99 2 °F (37 3 °C) (02/11/20 2001)  Temp Source: Oral (02/11/20 2001)  Respirations: 18 (02/11/20 2001)  Height: 5' 8" (172 7 cm) (02/11/20 2001)  Weight - Scale: 84 kg (185 lb 3 oz) (02/11/20 2001)  SpO2: 98 % (02/11/20 2001)    Physical Exam   Constitutional: He is oriented to person, place, and time  He appears well-developed and well-nourished  No distress  HENT:   Head: Normocephalic and atraumatic  Mouth/Throat: Oropharynx is clear and moist    Eyes: Pupils are equal, round, and reactive to light  EOM are normal  No scleral icterus  Neck: Neck supple  Cardiovascular: Normal rate, regular rhythm and normal heart sounds  Exam reveals no gallop and no friction rub     No murmur heard   Pulmonary/Chest: Effort normal and breath sounds normal  No respiratory distress  He has no wheezes  He has no rales  Abdominal: Soft  Bowel sounds are normal  He exhibits no distension  There is no tenderness  Musculoskeletal: He exhibits no deformity  Neurological: He is alert and oriented to person, place, and time  No cranial nerve deficit  GCS eye subscore is 4  GCS verbal subscore is 5  GCS motor subscore is 6  Skin: Skin is warm and dry  Capillary refill takes less than 2 seconds  No rash noted  He is not diaphoretic  No erythema  No pallor  Psychiatric: He has a normal mood and affect  Nursing note and vitals reviewed  Additional Data:     Lab Results: I have personally reviewed pertinent reports  Results from last 7 days   Lab Units 02/11/20  1622   WBC Thousand/uL 7 06   HEMOGLOBIN g/dL 13 7   HEMATOCRIT % 40 9   PLATELETS Thousands/uL 235   NEUTROS PCT % 62   LYMPHS PCT % 25   MONOS PCT % 11   EOS PCT % 2     Results from last 7 days   Lab Units 02/11/20  1622   SODIUM mmol/L 142   POTASSIUM mmol/L 3 4*   CHLORIDE mmol/L 102   CO2 mmol/L 31   BUN mg/dL 11   CREATININE mg/dL 0 93   ANION GAP mmol/L 9   CALCIUM mg/dL 9 5   ALBUMIN g/dL 3 8   TOTAL BILIRUBIN mg/dL 0 25   ALK PHOS U/L 92   ALT U/L 45   AST U/L 80*   GLUCOSE RANDOM mg/dL 101                       Imaging: I have personally reviewed pertinent reports  and I have personally reviewed pertinent films in PACS    CTA head and neck with and without contrast   Final Result by Jesusita Azar MD (02/11 1813)         1  No hemodynamically significant stenosis, dissection or occlusion of the carotid or vertebral arteries or major vessels of the [place]  2   No evidence of intracranial hemorrhage, infarct or mass effect  Workstation performed: [id number]         XR chest 2 views   ED Interpretation by Mare Guo DO (02/11 1605)   No infiltrates  No pneumothorax        Final Result by Kristal Urena MD (02/11 1650)      No acute cardiopulmonary disease  Workstation performed: [id number]             EKG, Pathology, and Other Studies Reviewed on Admission:   · EKG:  Sinus bradycardia, rate 59  Allscripts / Epic Records Reviewed: Yes     ** Please Note: This note has been constructed using a voice recognition system   **

## 2020-02-12 NOTE — ASSESSMENT & PLAN NOTE
· Patient has had intermittent chest pain occurring almost every day for the past 2 years  Has not had any formal workup, in the past was deemed to be anxiety  · Inflammatory markers are elevated  · Initial EKG is nonischemic  · Initial troponin 13, next troponin is 16   · Suspect acute myocarditis, patient was sick with viral gastroenteritis approximately 1 week ago  · Obtain echocardiogram, continue to monitor troponins, monitor on telemetry  · Pain control with NSAIDs  · Cardiology consultation

## 2020-02-12 NOTE — ASSESSMENT & PLAN NOTE
This morning having CP again  8/10, radiating to left arm and left neck  Also with ST changes new when compared with prior  Discussed with cardiology  They will assess today   Echo pending  Lipid profile  No substantial risk factors for myocardial infarction secondary to CAD, and he is very young  Likely viral pericarditis/myocarditis  Troponins peaked  Will continue to trend

## 2020-02-13 VITALS
BODY MASS INDEX: 28.07 KG/M2 | OXYGEN SATURATION: 97 % | RESPIRATION RATE: 16 BRPM | WEIGHT: 185.19 LBS | DIASTOLIC BLOOD PRESSURE: 63 MMHG | HEIGHT: 68 IN | HEART RATE: 66 BPM | SYSTOLIC BLOOD PRESSURE: 118 MMHG | TEMPERATURE: 98 F

## 2020-02-13 LAB
ATRIAL RATE: 47 BPM
ATRIAL RATE: 59 BPM
ATRIAL RATE: 59 BPM
P AXIS: 39 DEGREES
P AXIS: 42 DEGREES
P AXIS: 69 DEGREES
PR INTERVAL: 148 MS
PR INTERVAL: 156 MS
PR INTERVAL: 158 MS
QRS AXIS: 59 DEGREES
QRS AXIS: 66 DEGREES
QRS AXIS: 75 DEGREES
QRSD INTERVAL: 92 MS
QRSD INTERVAL: 94 MS
QRSD INTERVAL: 96 MS
QT INTERVAL: 390 MS
QT INTERVAL: 392 MS
QT INTERVAL: 424 MS
QTC INTERVAL: 375 MS
QTC INTERVAL: 386 MS
QTC INTERVAL: 388 MS
T WAVE AXIS: 25 DEGREES
T WAVE AXIS: 44 DEGREES
T WAVE AXIS: 45 DEGREES
VENTRICULAR RATE: 47 BPM
VENTRICULAR RATE: 59 BPM
VENTRICULAR RATE: 59 BPM

## 2020-02-13 PROCEDURE — 99232 SBSQ HOSP IP/OBS MODERATE 35: CPT | Performed by: INTERNAL MEDICINE

## 2020-02-13 PROCEDURE — 93010 ELECTROCARDIOGRAM REPORT: CPT | Performed by: INTERNAL MEDICINE

## 2020-02-13 RX ORDER — IBUPROFEN 400 MG/1
400 TABLET ORAL EVERY 8 HOURS SCHEDULED
Status: DISCONTINUED | OUTPATIENT
Start: 2020-02-13 | End: 2020-02-14 | Stop reason: HOSPADM

## 2020-02-13 RX ADMIN — IBUPROFEN 400 MG: 400 TABLET ORAL at 21:03

## 2020-02-13 RX ADMIN — RIVAROXABAN 20 MG: 20 TABLET, FILM COATED ORAL at 14:14

## 2020-02-13 RX ADMIN — IBUPROFEN 600 MG: 600 TABLET ORAL at 13:16

## 2020-02-13 RX ADMIN — IBUPROFEN 600 MG: 600 TABLET ORAL at 06:12

## 2020-02-13 RX ADMIN — TRAZODONE HYDROCHLORIDE 50 MG: 50 TABLET ORAL at 21:03

## 2020-02-13 RX ADMIN — COLCHICINE 0.6 MG: 0.6 TABLET, FILM COATED ORAL at 08:05

## 2020-02-13 NOTE — PROGRESS NOTES
Progress Note - Cardiology   Mary Farah 22 y o  male MRN: [id number]  Unit/Bed#: S -01 Encounter: [id number]  02/13/20  9:04 AM      Impression and Plan:    13-year-old without a prior cardiac history, no cardiac risk factors, although lipid status is not known, denies any drug use      Has had some viral GE symptoms for a couple of days, prior to onset of chest pains, felt in the left chest with some radiation to the neck, the radiating neck discomfort was worse with deep breaths, no particular positional change, initial troponin was 13, then 16 and subsequently trended down  ECG this morning showed diffuse ST elevations with BR depressions on some of the leads  CRP was over 90  No prior limitations with activity, he appears well built, works a physical job and works out with [email or web address] without any symptoms with activity  Does take some supplements      I have talked to his family and him  Overall clinical picture was suggestive of acute myopericarditis  Subsequently also reviewed his echocardiogram, which shows some basal inferior hypokinesis (often a normal variant)  and possible basal inferolateral hypokinesis  Echo also showed mild spontaneous echo contrast in the LV cavity      Overall clinical picture suggests acute myopericarditis and will treat as such      Plan:     Acute myopericarditis:  Symptoms have improved with NSAID  Continue colchicine   Flu negative  Spontaneous echo contrast in the LV:  This is usually associated with low-flow and risk of thrombus formation  LV function however is preserved   I will start him on short-term anticoagulation till his spontaneous echo contrast resolves        Addendum:  MRI-preliminary report confirms likely myocarditis involving the inferior and inferolateral walls-consistent with the wall motion abnormalities on echo  Was able to discuss with his parents and the patient about these findings    Also has spontaneous echo contrast in the LV, although with normal LV function  Will need anticoagulation  Gave him options-warfarin versus newer anticoagulants which would be off-label use  They are willing to try a newer anticoagulant, will try this Xarelto 20 mg daily, will receive his 1st dose now  Will also decrease Motrin dose to 400 3 times daily due to potential for thrombotic effect  Will keep the patient in house till tomorrow morning       ===================================================================    Chief Complaint:   Chief Complaint   Patient presents with    Neck Pain     per pt "he started with an onset chest pain with some SOB, pain in neck begun first after he woke upwith the sob and the numbness, "    Chest Pain         Subjective/Objective     Subjective:  Feels better, chest pains have almost completely resolved    Objective:  No distress at the time of exam    Patient Active Problem List   Diagnosis    Palpitations    Spina bifida occulta    TROY (generalized anxiety disorder)    Other chest pain    Hypokalemia       Vitals: /60 (BP Location: Left arm)   Pulse 70   Temp 97 7 °F (36 5 °C) (Oral)   Resp 16   Ht 5' 8" (1 727 m)   Wt 84 kg (185 lb 3 oz)   SpO2 98%   BMI 28 16 kg/m²     No intake/output data recorded  Wt Readings from Last 3 Encounters:   02/11/20 84 kg (185 lb 3 oz)   02/12/20 83 9 kg (185 lb)   10/16/19 83 kg (183 lb)     No intake or output data in the 24 hours ending 02/13/20 0904  No intake/output data recorded      Invasive Devices     Peripheral Intravenous Line            Peripheral IV 02/11/20 Right Antecubital 1 day                  Physical Exam:  GEN: Adriane Yung appears well, alert and oriented x 3, pleasant and cooperative   HEENT: pupils equal, round, and reactive to light; extraocular muscles intact  NECK: supple, no carotid bruits or JVD  HEART: regular rhythm, normal S1 and S2, no murmur, no clicks, gallops or rubs   LUNGS: clear to auscultation bilaterally; no wheezes or rhonchi, no rales  ABDOMEN/GI: normal bowel sounds, soft, no tenderness, no distention  EXTREMITIES/Musculoskeltal: peripheral pulses normal; no clubbing, cyanosis, no edema  NEURO: no focal motor findings   SKIN: normal without suspicious lesions on exposed skin              Lab Results:   Results from last 7 days   Lab Units 02/12/20  0158 02/11/20  2227 02/11/20  1925   TROPONIN I ng/mL 10 88* 14 30* 16 20*     Results from last 7 days   Lab Units 02/12/20  0428 02/11/20  1622   WBC Thousand/uL 6 24 7 06   HEMOGLOBIN g/dL 13 3 13 7   HEMATOCRIT % 39 2 40 9   PLATELETS Thousands/uL 243 235         Results from last 7 days   Lab Units 02/12/20  0428 02/11/20  1622   POTASSIUM mmol/L 3 9 3 4*   CHLORIDE mmol/L 105 102   CO2 mmol/L 27 31   BUN mg/dL 13 11   CREATININE mg/dL 0 94 0 93   CALCIUM mg/dL 9 1 9 5   ALK PHOS U/L  --  92   ALT U/L  --  45   AST U/L  --  80*         Imaging: I have personally reviewed pertinent reports  EKG/Telemtry:  No events    Scheduled Meds:    Current Facility-Administered Medications:  acetaminophen 650 mg Oral Q6H PRN Shelia Body, PA-C    colchicine 0 6 mg Oral Daily Pinky Kramer MD    ibuprofen 600 mg Oral [facility] Pinky Kramer MD    ketorolac 15 mg Intravenous Q6H PRN Shelia Body, PA-C    ketorolac 30 mg Intravenous Q6H PRN Shelia Body, PA-C    ondansetron 4 mg Intravenous Q6H PRN Shelia Body, PA-C    sodium chloride 125 mL/hr Intravenous Continuous Shelia Body, PA-C Last Rate: 125 mL/hr (02/11/20 2148)   traZODone 50 mg Oral HS Shelia Body, PA-C      Continuous Infusions:    sodium chloride 125 mL/hr Last Rate: 125 mL/hr (02/11/20 2148)       Counseling / Coordination of Care  Total time spent 15 minutes including teaching and family updates  More than 50% was spent counseling pt and family

## 2020-02-13 NOTE — DISCHARGE INSTRUCTIONS
Myocarditis   WHAT YOU NEED TO KNOW:   Myocarditis is an inflammation of the muscle of your heart (myocardium)  The myocardium pumps blood through the heart and to other parts of the body  With myocarditis, the heart muscle can become damaged  This weakens the heart and makes it work harder  Over time, this may cause your heart to enlarge, lead to heart failure, and become life-threatening  DISCHARGE INSTRUCTIONS:   Medicines: You may need any of the following:  · Aspirin  helps thin the blood to keep clots from forming  If you are told to take aspirin, do not take acetaminophen or ibuprofen instead  This medicine makes it more likely for you to bleed or bruise  · Blood thinners  help prevent blood clots from forming  Clots can cause strokes, heart attacks, and be life-threatening  Blood thinners make it more likely for you to bleed or bruise  If you are taking a blood thinner:    ¨ Watch for bleeding from your gums or nose  Watch for blood in your urine and bowel movements  Use a soft washcloth and a soft toothbrush  If you shave, use an electric shaver  ¨ Be aware of what medicines you take  Many medicines cannot be used when you take medicine to thin your blood  Tell your dentist and other healthcare providers that you take blood-thinning medicine  Wear or carry medical alert information that says you are taking this medicine  ¨ Take this medicine exactly as your healthcare provider tells you  Tell him right away if you forget to take the medicine, or if you take too much  You may need to have regular blood tests while on this medicine  Your healthcare provider uses these tests to decide how much medicine is right for you  ¨ Talk to your healthcare provider about your diet  This medicine works best when you eat about the same amount of vitamin K every day  Vitamin K is found in green leafy vegetables and other foods, such as cooked peas and kiwifruit  · Take your medicine as directed  Contact your healthcare provider if you think your medicine is not helping or if you have side effects  Tell him if you are allergic to any medicine  Keep a list of the medicines, vitamins, and herbs you take  Include the amounts, and when and why you take them  Bring the list or the pill bottles to follow-up visits  Carry your medicine list with you in case of an emergency  Follow up with your healthcare provider as directed:  Write down your questions so you remember to ask them during your visits  Limit physical activity:  Your healthcare provider may suggest that you rest until your symptoms decrease  He may suggest that you avoid heavy lifting or certain physical activities  Ask what activities are safe for you, when to begin exercise, and the best exercise plan for you  Help protect your heart:   · Eat heart healthy foods  Eat foods that help protect the heart, including plenty of fruits and vegetables, nuts, and sources of fiber  Eat foods that contain healthy fats, such as walnuts, salmon, and canola and soybean oils  You may need to eat foods low in cholesterol or sodium (salt)  You also may be told to limit saturated and trans fats  · Limit alcohol  Women should limit alcohol to 1 drink a day  Men should limit alcohol to 2 drinks a day  A drink of alcohol is 12 ounces of beer, 5 ounces of wine, or 1½ ounces of liquor  · Do not smoke  If you smoke, it is never too late to quit  Ask your healthcare provider for information if you need help quitting  Contact your healthcare provider if:   · You have a fever  · You have chills, a cough, or feel weak and achy  · You have questions or concerns about your condition or care    Seek care immediately or call 911 if:   · You have any of the following signs of a heart attack:      ¨ Squeezing, pressure, or pain in your chest that lasts longer than 5 minutes or returns    ¨ Discomfort or pain in your back, neck, jaw, stomach, or arm     ¨ Trouble breathing    ¨ Nausea or vomiting    ¨ Lightheadedness or a sudden cold sweat, especially with chest pain or trouble breathing    · Your signs and symptoms come back or get worse  © 2017 [employer] Information is for End User's use only and may not be sold, redistributed or otherwise used for commercial purposes  All illustrations and images included in CareNotes® are the copyrighted property of A [employer] M [employer] Inc  or Aiden Li  The above information is an  only  It is not intended as medical advice for individual conditions or treatments  Talk to your doctor, nurse or pharmacist before following any medical regimen to see if it is safe and effective for you

## 2020-02-13 NOTE — ASSESSMENT & PLAN NOTE
Secondary to myocarditis  Will likely DC tomorrow  NSAIDs  Overall improving  No need for ischemic work up now given extremely low probability of CAD in this 22year old, who is otherwise fit and healthy

## 2020-02-13 NOTE — PROGRESS NOTES
Progress Note - Sarkis Ervin 1995, 22 y o  male MRN: [id number]    Unit/Bed#: S -01 Encounter: [id number]    Primary Care Provider: Kermit Michelle MD   Date and time admitted to hospital: 2020  3:39 PM        * Other chest pain  Assessment & Plan  Secondary to myocarditis  Will likely DC tomorrow  NSAIDs  Overall improving  No need for ischemic work up now given extremely low probability of CAD in this 22year old, who is otherwise fit and healthy  VTE Pharmacologic Prophylaxis:   Pharmacologic: Heparin  Mechanical VTE Prophylaxis in Place: Yes    Patient Centered Rounds: I have performed bedside rounds with nursing staff today  Discussions with Specialists or Other Care Team Provider:   Discussed with cardiology  Education and Discussions with Family / Patient: Discussed with patient and with mother  Time Spent for Care: 30 minutes  More than 50% of total time spent on counseling and coordination of care as described above  Current Length of Stay: 2 day(s)    Current Patient Status: Inpatient   Certification Statement: The patient will continue to require additional inpatient hospital stay due to monitoring on NSAID  Discharge Plan: Likely tomorrow  Code Status: Level 1 - Full Code      Subjective:   Patient seen and examined  Feeling "okay"    Objective:     Vitals:   Temp (24hrs), Av 8 °F (36 6 °C), Min:97 7 °F (36 5 °C), Max:98 1 °F (36 7 °C)    Temp:  [97 7 °F (36 5 °C)-98 1 °F (36 7 °C)] 98 1 °F (36 7 °C)  HR:  [62-70] 65  Resp:  [15-16] 15  BP: (117-132)/(60-68) 120/65  SpO2:  [95 %-99 %] 95 %  Body mass index is 28 16 kg/m²  Input and Output Summary (last 24 hours):     No intake or output data in the 24 hours ending 20 1615    Physical Exam:     Physical Exam   Constitutional: He appears well-developed  No distress  HENT:   Head: Normocephalic  Mouth/Throat: No oropharyngeal exudate     Eyes: Pupils are equal, round, and reactive to light  Left eye exhibits no discharge  No scleral icterus  Neck: No JVD present  No tracheal deviation present  No thyromegaly present  Cardiovascular: Exam reveals no friction rub  No murmur heard  Pulmonary/Chest: No stridor  No respiratory distress  He exhibits no tenderness  Abdominal: He exhibits no distension and no mass  There is no tenderness  There is no rebound and no guarding  No hernia  Musculoskeletal: He exhibits no edema, tenderness or deformity  Lymphadenopathy:     He has no cervical adenopathy  Neurological: He is alert  He displays normal reflexes  No cranial nerve deficit or sensory deficit  He exhibits normal muscle tone  Coordination normal    Skin: Capillary refill takes less than 2 seconds  No rash noted  He is not diaphoretic  No erythema  There is pallor  Psychiatric: He has a normal mood and affect  Additional Data:     Labs:    Results from last 7 days   Lab Units 02/12/20  0428   WBC Thousand/uL 6 24   HEMOGLOBIN g/dL 13 3   HEMATOCRIT % 39 2   PLATELETS Thousands/uL 243   NEUTROS PCT % 43   LYMPHS PCT % 42   MONOS PCT % 12   EOS PCT % 3     Results from last 7 days   Lab Units 02/12/20  0428 02/11/20  1622   SODIUM mmol/L 142 142   POTASSIUM mmol/L 3 9 3 4*   CHLORIDE mmol/L 105 102   CO2 mmol/L 27 31   BUN mg/dL 13 11   CREATININE mg/dL 0 94 0 93   ANION GAP mmol/L 10 9   CALCIUM mg/dL 9 1 9 5   ALBUMIN g/dL  --  3 8   TOTAL BILIRUBIN mg/dL  --  0 25   ALK PHOS U/L  --  92   ALT U/L  --  45   AST U/L  --  80*   GLUCOSE RANDOM mg/dL 102 101                           * I Have Reviewed All Lab Data Listed Above  * Additional Pertinent Lab Tests Reviewed: Ann 67 Admission Reviewed    Imaging:    Imaging Reports Reviewed Today Include:   MRI cardiac -     Imaging Personally Reviewed by Myself Includes:    As above      Recent Cultures (last 7 days):           Last 24 Hours Medication List:     Current Facility-Administered Medications:  acetaminophen 650 mg Oral Q6H PRN Zoila Edwards PA-C    colchicine 0 6 mg Oral Daily Laura Renee MD    ibuprofen 400 mg Oral [facility] Laura Renee MD    ondansetron 4 mg Intravenous Q6H PRN Zoila Edwards PA-C    rivaroxaban 20 mg Oral Daily With Margery Krabbe, MD    sodium chloride 125 mL/hr Intravenous Continuous Zoila Edwards PA-C Last Rate: 125 mL/hr (02/11/20 2148)   traZODone 50 mg Oral HS Zoila Edwards PA-C         Today, Patient Was Seen By: Cassandra Garnica MD    ** Please Note: Dictation voice to text software may have been used in the creation of this document   **

## 2020-02-14 PROCEDURE — 99239 HOSP IP/OBS DSCHRG MGMT >30: CPT | Performed by: INTERNAL MEDICINE

## 2020-02-14 PROCEDURE — 93306 TTE W/DOPPLER COMPLETE: CPT | Performed by: INTERNAL MEDICINE

## 2020-02-14 PROCEDURE — 99231 SBSQ HOSP IP/OBS SF/LOW 25: CPT | Performed by: INTERNAL MEDICINE

## 2020-02-14 RX ORDER — COLCHICINE 0.6 MG/1
0.6 TABLET ORAL DAILY
Qty: 30 TABLET | Refills: 0 | Status: SHIPPED | OUTPATIENT
Start: 2020-02-15 | End: 2020-11-19 | Stop reason: ALTCHOICE

## 2020-02-14 RX ORDER — IBUPROFEN 400 MG/1
400 TABLET ORAL EVERY 8 HOURS SCHEDULED
Qty: 15 TABLET | Refills: 0 | Status: SHIPPED | OUTPATIENT
Start: 2020-02-14 | End: 2020-02-14 | Stop reason: HOSPADM

## 2020-02-14 RX ORDER — COLCHICINE 0.6 MG/1
0.6 TABLET ORAL DAILY
Qty: 30 TABLET | Refills: 0 | Status: SHIPPED | OUTPATIENT
Start: 2020-02-15 | End: 2020-02-14

## 2020-02-14 RX ORDER — IBUPROFEN 200 MG
200 TABLET ORAL EVERY 8 HOURS SCHEDULED
Qty: 21 TABLET | Refills: 0 | Status: SHIPPED | OUTPATIENT
Start: 2020-02-28 | End: 2020-02-14 | Stop reason: HOSPADM

## 2020-02-14 RX ORDER — PANTOPRAZOLE SODIUM 20 MG/1
20 TABLET, DELAYED RELEASE ORAL DAILY
Qty: 30 TABLET | Refills: 0 | Status: SHIPPED | OUTPATIENT
Start: 2020-02-14 | End: 2020-11-19 | Stop reason: ALTCHOICE

## 2020-02-14 RX ORDER — IBUPROFEN 200 MG
400 TABLET ORAL EVERY 8 HOURS SCHEDULED
Qty: 93 TABLET | Refills: 0 | Status: SHIPPED | OUTPATIENT
Start: 2020-02-14 | End: 2020-03-09 | Stop reason: ALTCHOICE

## 2020-02-14 RX ORDER — IBUPROFEN 400 MG/1
400 TABLET ORAL EVERY 12 HOURS
Qty: 14 TABLET | Refills: 0 | Status: SHIPPED | OUTPATIENT
Start: 2020-02-20 | End: 2020-02-14 | Stop reason: HOSPADM

## 2020-02-14 RX ORDER — IBUPROFEN 200 MG
200 TABLET ORAL EVERY 12 HOURS
Qty: 14 TABLET | Refills: 0 | Status: SHIPPED | OUTPATIENT
Start: 2020-03-05 | End: 2020-02-14 | Stop reason: HOSPADM

## 2020-02-14 RX ADMIN — IBUPROFEN 400 MG: 400 TABLET ORAL at 06:53

## 2020-02-14 RX ADMIN — COLCHICINE 0.6 MG: 0.6 TABLET, FILM COATED ORAL at 08:56

## 2020-02-14 NOTE — PROGRESS NOTES
Patient asleep in bed  No visible signs of distress at this time  Sinus radha on monitor  Bed low and locked; call bell within reach  Will continue to monitor   Rhbrijesh Moore RN

## 2020-02-14 NOTE — ASSESSMENT & PLAN NOTE
Secondary to myocarditis  The plan is for colchicine daily for several months  In addition Motrin 400 mg 3 times a day for 5 days, 4 mg twice a day for 1 week followed by 200 mg 3 times a day for 1 week and finally 200 mg twice a day for 1 week  He will follow-up with cardiology in 12 days time and then again in the beginning of March  He will be on anticoagulation up until his follow-up in 1 month's time  He was provided with prescriptions for Motrin and colchicine as well as Xarelto and case management and nursing were informed that [facility] to receive medications at bedside prior to discharge  The patient will need an echocardiogram done prior to his appointment in 1 month's time

## 2020-02-14 NOTE — PROGRESS NOTES
Pt resting comfortably  Vital signs stable  Call bell within reach  Safety measures in place   Will continue to monitor

## 2020-02-14 NOTE — SOCIAL WORK
Pt discharging home today  Scripts sent to [facility] for meds to bed  CM advised Dr Chidi Michelle Colchicine only covered via mail order through insurance  Discussed same with pt and mother at bedside who are agreeable to paying cash price with [employer] discount card $51 67 @ [facility]  Pt given paper script with discount card  Will follow up outpatient with Cardiology on 3/9  No additional CM needs noted

## 2020-02-14 NOTE — NURSING NOTE
Pt IV/tele removed  AVS reviewed  Pt left walking in stable condition with mother    Gaviota Cheng, RN

## 2020-02-14 NOTE — PROGRESS NOTES
Progress Note - Cardiology   Sage Salinas 22 y o  male MRN: [id number]  Unit/Bed#: S -01 Encounter: [id number]  02/14/20  9:10 AM      Impression and Plan:    64-year-old without a prior cardiac history, no cardiac risk factors, although lipid status is not known, denies any drug use      Has had some viral GE symptoms for a couple of days, prior to onset of chest pains, felt in the left chest with some radiation to the neck, the radiating neck discomfort was worse with deep breaths, no particular positional change, initial troponin was 13, then 16 and subsequently trended down  ECG this morning showed diffuse ST elevations with BR depressions on some of the leads  CRP was over 90  No prior limitations with activity, he appears well built, works a physical job and works out with [employer] without any symptoms with activity  Does take some supplements      I have talked to his family and him  Overall clinical picture was suggestive of acute myopericarditis  Subsequently also reviewed his echocardiogram, which shows some basal inferior hypokinesis (often a normal variant)  and possible basal inferolateral hypokinesis  Echo also showed mild spontaneous echo contrast in the LV cavity      Overall clinical picture suggested acute myopericarditis and will treat as such  Cardiac MRI also was suggestive of the same      Plan:     Acute myopericarditis:  Symptoms have improved with NSAID  Continue colchicine   Flu negative  For regimen  Spontaneous echo contrast in the LV:  This is usually associated with low-flow and risk of thrombus formation  LV function however is preserved   I will start him on short-term anticoagulation till his spontaneous echo contrast resolves      Gave him and his family options-warfarin versus newer anticoagulants which would be off-label use  They are willing to try a newer anticoagulant, will try this Xarelto 20 mg daily, will receive his 1st dose now    Will also decrease Motrin dose to 400 - 3 times daily due to potential for thrombotic effect  This will be is regimen going forward: Motrin 400 mg 3 times daily for 5 more days - till 2/19/2020  Motrin 400 mg 2 times daily for 1 week-till 2/26/2020  Motrin 200 mg 3 times daily for 1 week-till 3/5/2020  Motrin 200 mg 2 times daily for 1 week-till 3/12/2020    Colchicine 0 6 mg daily to be continued-for a total of three months    Xarelto 20 mg q p m -with dinner to be continued till his office visit with me-in 1 month    At his office visit with me-(3/9/2020 at 1:40 p m)  , will have him obtain a limited echocardiogram, to confirm that spontaneous echo contrast has resolved and if this is so, will discontinue Xarelto on confirmation  Two weeks post discontinuation of Xarelto, will obtain another limited echo to confirm that spontaneous echo contrast is not recurring-while off anti coagulation    He has 2 appointments scheduled already:  2/26/2020 at 10:20 a m  with CRNP  3/9/2020 at 1:40 p m  with me at the [facility] office (with echo)          ===================================================================    Chief Complaint:   Chief Complaint   Patient presents with    Neck Pain     per pt "he started with an onset chest pain with some SOB, pain in neck begun first after he woke upwith the sob and the numbness, "    Chest Pain         Subjective/Objective     Subjective:  Feels better, chest pains have completely resolved    Objective:  No distress at the time of exam    Patient Active Problem List   Diagnosis    Palpitations    Spina bifida occulta    TROY (generalized anxiety disorder)    Other chest pain    Hypokalemia       Vitals: /63 (BP Location: Left arm)   Pulse 66   Temp 98 °F (36 7 °C) (Oral)   Resp 16   Ht 5' 8" (1 727 m)   Wt 84 kg (185 lb 3 oz)   SpO2 97%   BMI 28 16 kg/m²     No intake/output data recorded    Wt Readings from Last 3 Encounters:   02/11/20 84 kg (185 lb 3 oz) 02/12/20 83 9 kg (185 lb)   10/16/19 83 kg (183 lb)     No intake or output data in the 24 hours ending 02/14/20 0910  No intake/output data recorded  Invasive Devices     Peripheral Intravenous Line            Peripheral IV 02/11/20 Right Antecubital 2 days                  Physical Exam:  GEN: Giovani Glez appears well, alert and oriented x 3, pleasant and cooperative   HEENT: pupils equal, round, and reactive to light; extraocular muscles intact  NECK: supple, no carotid bruits or JVD  HEART: regular rhythm, normal S1 and S2, no murmur, no clicks, gallops or rubs   LUNGS: clear to auscultation bilaterally; no wheezes or rhonchi, no rales  ABDOMEN/GI: normal bowel sounds, soft, no tenderness, no distention  EXTREMITIES/Musculoskeltal: peripheral pulses normal; no clubbing, cyanosis, no edema  NEURO: no focal motor findings   SKIN: normal without suspicious lesions on exposed skin              Lab Results:   Results from last 7 days   Lab Units 02/12/20  0158 02/11/20  2227 02/11/20  1925   TROPONIN I ng/mL 10 88* 14 30* 16 20*     Results from last 7 days   Lab Units 02/12/20  0428 02/11/20  1622   WBC Thousand/uL 6 24 7 06   HEMOGLOBIN g/dL 13 3 13 7   HEMATOCRIT % 39 2 40 9   PLATELETS Thousands/uL 243 235         Results from last 7 days   Lab Units 02/12/20  0428 02/11/20  1622   POTASSIUM mmol/L 3 9 3 4*   CHLORIDE mmol/L 105 102   CO2 mmol/L 27 31   BUN mg/dL 13 11   CREATININE mg/dL 0 94 0 93   CALCIUM mg/dL 9 1 9 5   ALK PHOS U/L  --  92   ALT U/L  --  45   AST U/L  --  80*         Imaging: I have personally reviewed pertinent reports      EKG/Telemtry:  No events    Scheduled Meds:    Current Facility-Administered Medications:  acetaminophen 650 mg Oral Q6H PRN Buchtel Clear, PA-C    colchicine 0 6 mg Oral Daily Charleen Mayo MD    ibuprofen 400 mg Oral [facility] Charleen Mayo MD    ondansetron 4 mg Intravenous Q6H PRN Buchtel Clear, PA-C    rivaroxaban 20 mg Oral Daily With Dr. TATTOFF Phan Bates MD    sodium chloride 125 mL/hr Intravenous Continuous Edith Vieira PA-C Last Rate: 125 mL/hr (02/11/20 2148)   traZODone 50 mg Oral HS Edith Vieira PA-C      Continuous Infusions:    sodium chloride 125 mL/hr Last Rate: 125 mL/hr (02/11/20 2148)       Counseling / Coordination of Care  Total time spent 15 minutes including teaching and family updates  More than 50% was spent counseling pt and family

## 2020-02-14 NOTE — PLAN OF CARE
Problem: CARDIOVASCULAR - ADULT  Goal: Maintains optimal cardiac output and hemodynamic stability  Description  INTERVENTIONS:  - Monitor I/O, vital signs and rhythm  - Monitor for S/S and trends of decreased cardiac output  - Administer and titrate ordered vasoactive medications to optimize hemodynamic stability  - Assess quality of pulses, skin color and temperature  - Assess for signs of decreased coronary artery perfusion  - Instruct patient to report change in severity of symptoms  2/14/2020 1035 by Yoselyn Gramajo RN  Outcome: Adequate for Discharge  2/14/2020 0857 by Yoselyn Gramajo RN  Outcome: Progressing  Goal: Absence of cardiac dysrhythmias or at baseline rhythm  Description  INTERVENTIONS:  - Continuous cardiac monitoring, vital signs, obtain 12 lead EKG if ordered  - Administer antiarrhythmic and heart rate control medications as ordered  - Monitor electrolytes and administer replacement therapy as ordered  2/14/2020 1035 by Yoselyn Gramajo RN  Outcome: Adequate for Discharge  2/14/2020 0857 by Yoselyn Gramajo RN  Outcome: Progressing     Problem: PAIN - ADULT  Goal: Verbalizes/displays adequate comfort level or baseline comfort level  Description  Interventions:  - Encourage patient to monitor pain and request assistance  - Assess pain using appropriate pain scale  - Administer analgesics based on type and severity of pain and evaluate response  - Implement non-pharmacological measures as appropriate and evaluate response  - Consider cultural and social influences on pain and pain management  - Notify physician/advanced practitioner if interventions unsuccessful or patient reports new pain  2/14/2020 1035 by Yoselyn Gramajo RN  Outcome: Adequate for Discharge  2/14/2020 0857 by Yoselyn Gramajo RN  Outcome: Progressing     Problem: DISCHARGE PLANNING  Goal: Discharge to home or other facility with appropriate resources  Description  INTERVENTIONS:  - Identify barriers to discharge w/patient and caregiver  - Arrange for needed discharge resources and transportation as appropriate  - Identify discharge learning needs (meds, wound care, etc )  - Arrange for interpretive services to assist at discharge as needed  - Refer to Case Management Department for coordinating discharge planning if the patient needs post-hospital services based on physician/advanced practitioner order or complex needs related to functional status, cognitive ability, or social support system  2/14/2020 1035 by Marietta Herr RN  Outcome: Adequate for Discharge  2/14/2020 0857 by Marietta Herr RN  Outcome: Progressing     Problem: Knowledge Deficit  Goal: Patient/family/caregiver demonstrates understanding of disease process, treatment plan, medications, and discharge instructions  Description  Complete learning assessment and assess knowledge base    Interventions:  - Provide teaching at level of understanding  - Provide teaching via preferred learning methods  2/14/2020 1035 by Marietta Herr RN  Outcome: Adequate for Discharge  2/14/2020 0857 by Marietta Herr RN  Outcome: Progressing

## 2020-02-14 NOTE — DISCHARGE INSTR - AVS FIRST PAGE
Dear Teresa Orlando,     It was our pleasure to care for you here at [facility]  It is our hope that we were always able to exceed the expected standards for your care during your stay  You were hospitalized due to ***  You were cared for on the *** floor under the service of Hernan Gann MD with the [facility] who covers for your primary care physician (PCP), Glendon Duane, MD, while you were hospitalized  If you have any questions or concerns related to this hospitalization, you may contact us at [phone]  For follow up as well as medication refills, we recommend that you follow up with your primary care physician  A registered nurse will reach out to you by phone within a few days after your discharge to answer any additional questions that you may have after going home  However, at this time we provide for you here, the most important instructions / recommendations at discharge:     · Notable Medication Adjustments -   · You were started on motrin/ibuprofen, colchicine and   The treatment plan is as follows: Motrin 400 mg 3 times daily for 5 more days - till 2/19/2020  Motrin 400 mg 2 times daily for 1 week-till 2/26/2020  Motrin 200 mg 3 times daily for 1 week-till 3/5/2020  Motrin 200 mg 2 times daily for 1 week-till 3/12/2020     Colchicine 0 6 mg daily to be continued-for a total of three months     Xarelto 20 mg every evening-with dinner to be continued till his office visit with Dr Bebeto Yeboah 1 month     At his office visit with Dr Gio Wright at 1:40 p m)  , will have you obtain a limited echocardiogram, to confirm that spontaneous echo contrast has resolved and if this is so, will discontinue Xarelto on confirmation      Two weeks post discontinuation of Xarelto, will obtain another limited echo to confirm that spontaneous echo contrast is not recurring-while off anti coagulation     You have 2 appointments scheduled already:  2/26/2020 at 10:20 a m  with CRNP  3/9/2020 at 1:40 p m  with me at the [place] office (with echo)           · Testing Required after Discharge -   · Echo  · Important follow up information -   · As above  · Other Instructions -   · Please take all medications as instructed  · If you pain returns/becomes worse please come to ER  · If you start experiencing stomach pains, bleeding, blood in your stool, urine or coughing up blood then seek urgent medical attention  · Please review this entire after visit summary as additional general instructions including medication list, appointments, activity, diet, any pertinent wound care, and other additional recommendations from your care team that may be provided for you  Refrain from exercise or physically challenging work until cardiology have seen you and cleared you for this         Sincerely,     Marielos Sparks MD

## 2020-02-14 NOTE — DISCHARGE SUMMARY
Discharge- Ruth Gan 1995, 22 y o  male MRN: [id number]    Unit/Bed#: S -01 Encounter: [id number]    Primary Care Provider: Geo Samano MD   Date and time admitted to hospital: 2/11/2020  3:39 PM        * Other chest pain  Assessment & Plan  Secondary to myocarditis  The plan is for colchicine daily for several months  In addition Motrin 400 mg 3 times a day for 5 days, 4 mg twice a day for 1 week followed by 200 mg 3 times a day for 1 week and finally 200 mg twice a day for 1 week  He will follow-up with cardiology in 12 days time and then again in the beginning of March  He will be on anticoagulation up until his follow-up in 1 month's time  He was provided with prescriptions for Motrin and colchicine as well as Xarelto and case management and nursing were informed that [facility] the [facility] to receive medications at bedside prior to discharge  The patient will need an echocardiogram done prior to his appointment in 1 month's time  Discharging Physician / Practitioner: Loren Humphrey MD  PCP: Geo Samano MD  Admission Date:   Admission Orders (From admission, onward)     Ordered        02/11/20 1857  Inpatient Admission  Once                   Discharge Date: 02/14/20    Resolved Problems  Date Reviewed: 2/14/2020    None          Consultations During Hospital Stay:  · Cardiology - Dr Nel Fitzpatrick -     Procedures Performed:   · MRI cardiac - official report pending at time of DC  · CTA head and neck - 1   No hemodynamically significant stenosis, dissection or occlusion of the carotid or vertebral arteries or major vessels of the [place]  2   No evidence of intracranial hemorrhage, infarct or mass effect  · CXR -   No acute cardiopulmonary disease  Significant Findings / Test Results:   · As above  Incidental Findings:   · None      Test Results Pending at Discharge (will require follow up):   · MRI report  Outpatient Tests Requested:  · Echo  Complications:    None  Reason for Admission:   Chest pain  Hospital Course:     Kamari Batista is a 22 y o  male patient who originally presented to the hospital on 2/11/2020 due to chest pain  He also had elevated troponins and Cardiology were consulted  He was diagnosed with myocarditis and an MRI although the report is still pending at the time of discharge was informally read by our cardiology team as myocarditis  The patient was discharged on the Motrin taper, see above  Also on colchicine daily as well as anticoagulation for echo findings of spontaneous contrast and LV  At the time of discharge the patient stated that his chest pain had improved  Please see above list of diagnoses and related plan for additional information  Condition at Discharge: stable     Discharge Day Visit / Exam:     Subjective:    Patient seen and examined  Feeling "much better  Vitals: Blood Pressure: 118/63 (02/13/20 2246)  Pulse: 66 (02/13/20 2246)  Temperature: 98 °F (36 7 °C) (02/13/20 2246)  Temp Source: Oral (02/13/20 2246)  Respirations: 16 (02/13/20 2246)  Height: 5' 8" (172 7 cm) (02/11/20 2001)  Weight - Scale: 84 kg (185 lb 3 oz) (02/11/20 2001)  SpO2: 97 % (02/13/20 2246)  Exam:   Physical Exam   Constitutional: He appears well-developed  No distress  HENT:   Head: Normocephalic  Mouth/Throat: No oropharyngeal exudate  Eyes: Pupils are equal, round, and reactive to light  Right eye exhibits no discharge  Left eye exhibits no discharge  No scleral icterus  Neck: Normal range of motion  Neck supple  No JVD present  No tracheal deviation present  No thyromegaly present  Cardiovascular: Exam reveals no gallop and no friction rub  No murmur heard  Pulmonary/Chest: No respiratory distress  He exhibits no tenderness  Abdominal: He exhibits no mass  There is no tenderness  There is no rebound and no guarding  No hernia  Musculoskeletal: He exhibits no edema, tenderness or deformity  Lymphadenopathy:     He has no cervical adenopathy  Neurological: He is alert  No cranial nerve deficit or sensory deficit  He exhibits normal muscle tone  Coordination normal    Skin: Capillary refill takes less than 2 seconds  No rash noted  He is not diaphoretic  No erythema  No pallor  Psychiatric: He has a normal mood and affect  Discussion with Family: discussed with SO and mother  Discharge instructions/Information to patient and family:   See after visit summary for information provided to patient and family  Provisions for Follow-Up Care:  See after visit summary for information related to follow-up care and any pertinent home health orders  Disposition:     Home    For Discharges to [facility] SNF:   · Not Applicable to this Patient - Not Applicable to this Patient    Planned Readmission: none  Discharge Statement:  I spent 45 minutes discharging the patient  This time was spent on the day of discharge  I had direct contact with the patient on the day of discharge  Greater than 50% of the total time was spent examining patient, answering all patient questions, arranging and discussing plan of care with patient as well as directly providing post-discharge instructions  Additional time then spent on discharge activities  Discharge Medications:  See after visit summary for reconciled discharge medications provided to patient and family        ** Please Note: This note has been constructed using a voice recognition system **

## 2020-02-14 NOTE — PLAN OF CARE
Problem: CARDIOVASCULAR - ADULT  Goal: Maintains optimal cardiac output and hemodynamic stability  Description  INTERVENTIONS:  - Monitor I/O, vital signs and rhythm  - Monitor for S/S and trends of decreased cardiac output  - Administer and titrate ordered vasoactive medications to optimize hemodynamic stability  - Assess quality of pulses, skin color and temperature  - Assess for signs of decreased coronary artery perfusion  - Instruct patient to report change in severity of symptoms  Outcome: Progressing  Goal: Absence of cardiac dysrhythmias or at baseline rhythm  Description  INTERVENTIONS:  - Continuous cardiac monitoring, vital signs, obtain 12 lead EKG if ordered  - Administer antiarrhythmic and heart rate control medications as ordered  - Monitor electrolytes and administer replacement therapy as ordered  Outcome: Progressing     Problem: PAIN - ADULT  Goal: Verbalizes/displays adequate comfort level or baseline comfort level  Description  Interventions:  - Encourage patient to monitor pain and request assistance  - Assess pain using appropriate pain scale  - Administer analgesics based on type and severity of pain and evaluate response  - Implement non-pharmacological measures as appropriate and evaluate response  - Consider cultural and social influences on pain and pain management  - Notify physician/advanced practitioner if interventions unsuccessful or patient reports new pain  Outcome: Progressing     Problem: DISCHARGE PLANNING  Goal: Discharge to home or other facility with appropriate resources  Description  INTERVENTIONS:  - Identify barriers to discharge w/patient and caregiver  - Arrange for needed discharge resources and transportation as appropriate  - Identify discharge learning needs (meds, wound care, etc )  - Arrange for interpretive services to assist at discharge as needed  - Refer to Case Management Department for coordinating discharge planning if the patient needs post-hospital services based on physician/advanced practitioner order or complex needs related to functional status, cognitive ability, or social support system  Outcome: Progressing     Problem: Knowledge Deficit  Goal: Patient/family/caregiver demonstrates understanding of disease process, treatment plan, medications, and discharge instructions  Description  Complete learning assessment and assess knowledge base    Interventions:  - Provide teaching at level of understanding  - Provide teaching via preferred learning methods  Outcome: Progressing

## 2020-02-17 ENCOUNTER — TRANSITIONAL CARE MANAGEMENT (OUTPATIENT)
Dept: FAMILY MEDICINE CLINIC | Facility: MEDICAL CENTER | Age: 25
End: 2020-02-17

## 2020-02-17 ENCOUNTER — TELEPHONE (OUTPATIENT)
Dept: FAMILY MEDICINE CLINIC | Facility: MEDICAL CENTER | Age: 25
End: 2020-02-17

## 2020-02-19 NOTE — UTILIZATION REVIEW
Notification of Discharge  This is a Notification of Discharge from our facility [address]  Please be advised that this patient has been discharge from our facility  Below you will find the admission and discharge date and time including the patients disposition  PRESENTATION DATE: 2/11/2020  3:39 PM  OBS ADMISSION DATE:   IP ADMISSION DATE: 2/11/20 1857   DISCHARGE DATE: 2/14/2020  3:02 PM  DISPOSITION: Home/Self Care Home/Self Care   Admission Orders listed below:  Admission Orders (From admission, onward)     Ordered        02/11/20 1857  Inpatient Admission  Once                   Please contact the UR Department if additional information is required to close this patient's authorization/case  [address] [employer] Utilization Review Department  Main: [phone] x carefully listen to the prompts  All voicemails are confidential   [email or web address]  Send all requests for admission clinical reviews, approved or denied determinations and any other requests to dedicated fax number below belonging to the campus where the patient is receiving treatment   List of dedicated fax numbers:  [address] DENIALS (Administrative/Medical Necessity) [phone]   [address] (Maternity/NICU/Pediatrics) [phone]   Ann-Marie Ruiz [phone]   Abby Benítez [phone]   [place] [place] [phone]   Adela Haynes [address] [phone]   [facility]  [phone]   [address] [facility]  [address] [address] [address] [facility] [phone]

## 2020-02-21 ENCOUNTER — OFFICE VISIT (OUTPATIENT)
Dept: FAMILY MEDICINE CLINIC | Facility: MEDICAL CENTER | Age: 25
End: 2020-02-21
Payer: COMMERCIAL

## 2020-02-21 ENCOUNTER — DOCUMENTATION (OUTPATIENT)
Dept: CARDIOLOGY CLINIC | Facility: CLINIC | Age: 25
End: 2020-02-21

## 2020-02-21 VITALS
RESPIRATION RATE: 16 BRPM | DIASTOLIC BLOOD PRESSURE: 80 MMHG | BODY MASS INDEX: 26.4 KG/M2 | SYSTOLIC BLOOD PRESSURE: 150 MMHG | HEIGHT: 68 IN | HEART RATE: 68 BPM | WEIGHT: 174.2 LBS

## 2020-02-21 DIAGNOSIS — R00.2 PALPITATIONS: Primary | ICD-10-CM

## 2020-02-21 DIAGNOSIS — I40.0 ACUTE VIRAL MYOCARDITIS: ICD-10-CM

## 2020-02-21 PROCEDURE — 1111F DSCHRG MED/CURRENT MED MERGE: CPT | Performed by: FAMILY MEDICINE

## 2020-02-21 PROCEDURE — 99496 TRANSJ CARE MGMT HIGH F2F 7D: CPT | Performed by: FAMILY MEDICINE

## 2020-02-21 NOTE — PROGRESS NOTES
Patient called c/o palpitations x2 days  Denies CP or SOB  Drinks 1 cup of coffee daily  Denies alcohol intake since hospital d/c  Continues to take Colchicine & Motrin  Saw PCP today, /80, HR 68  Per patient, PCP advised him to call our office re: palpitations  O/v w/ Jennifer 2/26    S/w Dr Tomer Hrenandez, advised for patient to wear a 24 hr holter  Next available holter @ [facility] 3/19  I ended up scheduling him @ [address] on Monday  Provided directions to office

## 2020-02-21 NOTE — PROGRESS NOTES
Hospital Follow Up   Office Visit Note  Alix Gee   22 y o    male   MRN: [id number]  [address]  [address]  [address] [place] [address] Meenu Bianchi  [id number] [phone]    PCP: Kelly Ramires MD  Cardiologist: Will be Dr Herminio Gallo           Assessment/plan  Myopericarditis, with trop to 16, CRP>90  Adm 2/11-2/14/2020  --on a tapering velasco of  ibuprofen and colchicine o 6 mg daily x 3 months total  --c MRI : consistent with myopericarditis  --Xarelto 20 mg daily given spontaneous echo contrast  In the LV cavity, until repeat echo and follow-up with Dr Herminio Gallo next week  Spina bifida occulta  palpitations  Anxiety-on trazodone and escitalopram  Cardiac testing  --TTE 2/12/20  EF60 %  Mild spontaneous echo contrast was noted in the left ventricular cavity  There were mild hypokinesis of the inferior and basal to mid inferolateral walls  RV normal MIld TR  --c MRI- 2/12/2020  1  Top normal left ventricular size with low-normal left ventricular systolic function  2  Normal right ventricular size and systolic function  3  No significant valvular abnormalities  4  Patchy subepicardial fibrosis of the basal to mid inferior and inferolateral walls  This is highly suggestive of myopericarditis           Summary of recommendations  -Heart healthy diet  Educational information provided  -Fasting Lipid profile, for CV screening and CRP prior to the next OV  -He has a follow-up echocardiogram March 9th  -Once anticoagulation has been discontinued, a repeat echocardiogram 2 weeks after that has been recommended to assure there has no further recurrence of spontaneous echo contrast in the LV cavity, while OFF OAC  -Follow up will be scheduled with Dr Herminio Gallo 3/9/2020            OZZIE Vaughn is a 22 y o  male   with  spina bifida occulta and anxiety  He has a history of palpitations  He has not had a prior cardiac evaluation He does not smoke    He does not use recreational drugs  He does not drink alcohol in excess  He admits to using  protein supplements  prior to his exercise regimen  He presented to the hospital on 2/11/2020 due to chest pain  Reported intense midsternal chest pain with deep breaths , with radiation up into his neck and down his left arm  He admitted to symptoms of viral gastroenteritis a few days prior, with nausea, vomiting and diarrhea      He had an elevated troponin, initially 13 4, peaking at 16 and trended down  Cardiology was consulted  His EKG showed showed diffuse ST elevations with ND depressions on some of the leads   CRP was over 90  He works a physical job, and performs weight lifting on a routine basis  He does take some supplements prior to his workouts  His lipid status is not known  His echocardiogram showed preserved LV function and inferior hypokinesis  He was diagnosed with myopericarditis, and placed on ibuprofen and colchicine  A Cardiac MRI is pending  However there was spontaneous contrast noted in the LV cavity and he was also started on anticoagulation with Xarelto  Given the need for anticoagulation, ibuprofen dose was decreased to 400 mg t i d  it was recommended to continue anticoagulation until the spontaneous echo contrast resolves  The preliminary MRI report confirms, likely myocarditis involving the inferior and inferolateral walls, consistent with a wall motion abnormality noted on echocardiogram     Per Dr Klaudia Mahajan:  Motrin 400 mg 3 times daily for 5 more days - till 2/19/2020  Motrin 400 mg 2 times daily for 1 week-till 2/26/2020  Motrin 200 mg 3 times daily for 1 week-till 3/5/2020  Motrin 200 mg 2 times daily for 1 week-till 3/12/2020       2/26/2020  He presents for hospital follow-up  He feels well, no chest pain no shortness of breath no lightheadedness or dizziness  Soon after he was discharged he did feel intermittent palpitations which have resolved with time  He has been tolerating his medications  He did have some loose bowel/diarrhea however that seemed to have resolved as well  He has been taking the Motrin less frequently than prescribed, because he feels well     I reviewed with him the regimen for Motrin as outlined above, and recommended compliance to decrease recurrence  He is agreeable  Thus far, he has been back at work at [employer], and doing well  He has not yet begun to exercise  I liberated him to begin to exercise, firstly 3 times a week, and gradually increase his tolerance  He is agreeable  I reviewed with him is upcoming appointments and he understands the times and scheduled repeat echocardiogram   He is agreeable to getting a fasting lipid profile for cardiovascular screening and we will also repeat a CRP as we would expect to see improvement as his symptoms have improved  His exam is benign  His blood pressure is 130/80      Assessment  Diagnoses and all orders for this visit:    Myopericarditis    Spina bifida occulta    TROY (generalized anxiety disorder)    Palpitations          No past medical history on file  Review of Systems   Constitution: Negative for chills  Cardiovascular: Negative for chest pain, claudication, cyanosis, dyspnea on exertion, irregular heartbeat, leg swelling, near-syncope, orthopnea, palpitations, paroxysmal nocturnal dyspnea and syncope  Respiratory: Negative for cough and shortness of breath  Gastrointestinal: Negative for heartburn and nausea  Neurological: Negative for dizziness, focal weakness, headaches, light-headedness and weakness  All other systems reviewed and are negative  No Known Allergies        Current Outpatient Medications:     colchicine (COLCRYS) 0 6 mg tablet, Take 1 tablet (0 6 mg total) by mouth daily, Disp: 30 tablet, Rfl: 0    escitalopram (LEXAPRO) 10 mg tablet, Take 1 tablet (10 mg total) by mouth daily, Disp: 90 tablet, Rfl: 1    ibuprofen (MOTRIN) 200 mg tablet, Take 2 tablets (400 mg total) by mouth every 8 (eight) hours Take 400 mg 3x a day for 5 days, then 400 mg 2x a day for a week, then 200 mg 3x a day for 1 week, then 200mg 2x a day for 1 week , Disp: 93 tablet, Rfl: 0    pantoprazole (PROTONIX) 20 mg tablet, Take 1 tablet (20 mg total) by mouth daily, Disp: 30 tablet, Rfl: 0    rivaroxaban (XARELTO) 20 mg tablet, Take 1 tablet (20 mg total) by mouth daily with dinner, Disp: 30 tablet, Rfl: 0    traZODone (DESYREL) 50 mg tablet, TAKE 1-2 TABLETS ( MG TOTAL) BY MOUTH DAILY AT BEDTIME, Disp: 60 tablet, Rfl: 1        Social History     Socioeconomic History    Marital status: Single     Spouse name: Not on file    Number of children: Not on file    Years of education: Not on file    Highest education level: Not on file   Occupational History    Not on file   Social Needs    Financial resource strain: Not on file    Food insecurity:     Worry: Not on file     Inability: Not on file    Transportation needs:     Medical: Not on file     Non-medical: Not on file   Tobacco Use    Smoking status: Current Some Day Smoker    Smokeless tobacco: Never Used   Substance and Sexual Activity    Alcohol use: Yes     Comment: Socially    Drug use: No    Sexual activity: Not on file   Lifestyle    Physical activity:     Days per week: Not on file     Minutes per session: Not on file    Stress: Not on file   Relationships    Social connections:     Talks on phone: Not on file     Gets together: Not on file     Attends Jain service: Not on file     Active member of club or organization: Not on file     Attends meetings of clubs or organizations: Not on file     Relationship status: Not on file    Intimate partner violence:     Fear of current or ex partner: Not on file     Emotionally abused: Not on file     Physically abused: Not on file     Forced sexual activity: Not on file   Other Topics Concern    Not on file   Social History Narrative    Caffeine use       Family History   Problem Relation Age of Onset    No Known Problems Mother     No Known Problems Father     ADD / ADHD Sister        Physical Exam   Constitutional: He is oriented to person, place, and time  No distress  HENT:   Head: Normocephalic and atraumatic  Eyes: Conjunctivae and EOM are normal    Neck: Normal range of motion  Neck supple  Cardiovascular: Normal rate, regular rhythm, normal heart sounds and intact distal pulses  Pulmonary/Chest: Effort normal and breath sounds normal    Abdominal: Soft  Bowel sounds are normal    Musculoskeletal: Normal range of motion  Neurological: He is alert and oriented to person, place, and time  Skin: Skin is warm and dry  Psychiatric: He has a normal mood and affect  Nursing note and vitals reviewed  Vitals: There were no vitals taken for this visit  Wt Readings from Last 3 Encounters:   02/21/20 79 kg (174 lb 3 2 oz)   02/11/20 84 kg (185 lb 3 oz)   02/12/20 83 9 kg (185 lb)         Labs & Results:  Lab Results   Component Value Date    WBC 6 24 02/12/2020    HGB 13 3 02/12/2020    HCT 39 2 02/12/2020    MCV 87 02/12/2020     02/12/2020     No results found for: BNP  No components found for: CHEM  Troponin I   Date Value Ref Range Status   02/12/2020 10 88 (H) <=0 04 ng/mL Final     Comment:       [employer] Chemistry analyzer 99% cutoff is > 0 04 ng/mL in network labs     o cTnI 99% cutoff is useful only when applied to patients in the clinical setting of myocardial ischemia   o cTnI 99% cutoff should be interpreted in the context of clinical history, ECG findings and possibly cardiac imaging to establish correct diagnosis     o cTnI 99% cutoff may be suggestive but clearly not indicative of a coronary event without the clinical setting of myocardial ischemia      02/11/2020 14 30 (H) <=0 04 ng/mL Final     Comment:       [employer] Chemistry analyzer 99% cutoff is > 0 04 ng/mL in network labs     o cTnI 99% cutoff is useful only when applied to patients in the clinical setting of myocardial ischemia   o cTnI 99% cutoff should be interpreted in the context of clinical history, ECG findings and possibly cardiac imaging to establish correct diagnosis  o cTnI 99% cutoff may be suggestive but clearly not indicative of a coronary event without the clinical setting of myocardial ischemia      2020 16 20 (H) <=0 04 ng/mL Final     Comment:       [employer] Chemistry analyzer 99% cutoff is > 0 04 ng/mL in network labs     o cTnI 99% cutoff is useful only when applied to patients in the clinical setting of myocardial ischemia   o cTnI 99% cutoff should be interpreted in the context of clinical history, ECG findings and possibly cardiac imaging to establish correct diagnosis  o cTnI 99% cutoff may be suggestive but clearly not indicative of a coronary event without the clinical setting of myocardial ischemia  Results for orders placed during the hospital encounter of 20   Echo complete with contrast if indicated    Narrative [address]  [phone]    Transthoracic Echocardiogram  2D, M-mode, Doppler, and Color Doppler    Study date:  2020    Patient: Katheryn Villegas  MR number: [id number]  Account number: [de-identified]  : 1995  Age: 22 years  Gender: Male  Status: Inpatient  Location: Bedside  Height: 68 in  Weight: 184 6 lb  BP: 115/ 65 mmHg    Indications: Assess left ventricular function  Diagnoses: R07 9 - Chest pain, unspecified    Sonographer:  NEAL Webb  Primary Physician:  Ramesh Azevedo MD  Referring Physician:  Juliana Monroe PA-C  Group:  [id number]  Interpreting Physician:  Zeny Ma MD    SUMMARY    LEFT VENTRICLE:  The ventricle was moderately dilated  Systolic function was normal by visual assessment  Ejection fraction was estimated to be 60 %  Mild spontaneous echo contrast was noted in the left ventricular cavity    There were no regional wall motion abnormalities  RIGHT VENTRICLE:  The size was normal   Systolic function was normal     TRICUSPID VALVE:  There was mild regurgitation  PULMONIC VALVE:  There was mild regurgitation  HISTORY: PRIOR HISTORY: Chest pain, Cardiomegaly, Myocarditis, Anxiety    PROCEDURE: The procedure was performed at the bedside  This was a routine study  The transthoracic approach was used  The study included complete 2D imaging, M-mode, complete spectral Doppler, and color Doppler  The heart rate was 49 bpm,  at the start of the study  Images were obtained from the parasternal, apical, subcostal, and suprasternal notch acoustic windows  Image quality was good  LEFT VENTRICLE: The ventricle was moderately dilated  Systolic function was normal by visual assessment  Ejection fraction was estimated to be 60 %  Mild spontaneous echo contrast was noted in the left ventricular cavity  There were no regional wall motion abnormalities  Wall thickness was normal  DOPPLER: Left ventricular diastolic function parameters were normal     RIGHT VENTRICLE: The size was normal  Systolic function was normal  Wall thickness was normal     LEFT ATRIUM: Size was at the upper limits of normal     RIGHT ATRIUM: Size was at the upper limits of normal     MITRAL VALVE: Valve structure was normal  There was normal leaflet separation  DOPPLER: The transmitral velocity was within the normal range  There was no evidence for stenosis  There was no significant regurgitation  AORTIC VALVE: The valve was trileaflet  Leaflets exhibited normal thickness and normal cuspal separation  DOPPLER: Transaortic velocity was within the normal range  There was no evidence for stenosis  There was no significant  regurgitation  TRICUSPID VALVE: The valve structure was normal  There was normal leaflet separation  DOPPLER: The transtricuspid velocity was within the normal range  There was no evidence for stenosis  There was mild regurgitation      PULMONIC VALVE: Leaflets exhibited normal thickness, no calcification, and normal cuspal separation  DOPPLER: The transpulmonic velocity was within the normal range  There was mild regurgitation  PERICARDIUM: There was no pericardial effusion  The pericardium was normal in appearance  AORTA: The root exhibited normal size  SYSTEMIC VEINS: IVC: The inferior vena cava was normal in size  SYSTEM MEASUREMENT TABLES    2D  %FS: 28 3 %  Ao Diam: 2 97 cm  EDV(Teich): 139 63 ml  EF(Teich): 54 17 %  ESV(Teich): 63 99 ml  IVSd: 0 81 cm  LA Area: 22 1 cm2  LA Diam: 3 97 cm  LVEDV MOD A4C: 182 39 ml  LVEF MOD A4C: 56 05 %  LVESV MOD A4C: 80 16 ml  LVIDd: 5 37 cm  LVIDs: 3 85 cm  LVLd A4C: 9 8 cm  LVLs A4C: 7 95 cm  LVPWd: 0 85 cm  RA Area: 16 86 cm2  RVIDd: 4 15 cm  SV MOD A4C: 102 23 ml  SV(Teich): 75 64 ml    PW  E': 0 15 m/s  E/E': 6 26  MV A Cliff: 0 47 m/s  MV Dec Wilcox: 5 52 m/s2  MV DecT: 167 59 ms  MV E Cliff: 0 93 m/s  MV E/A Ratio: 1 97  MV PHT: 48 6 ms  MVA By PHT: 4 53 cm2    [employer] Accredited Echocardiography Laboratory    Prepared and electronically signed by    Allyson Norman MD  Signed 12-Feb-2020 17:17:25    Addendum Date: 2/14/2020 8:31:56 AM  There was mild hypokinesis of the inferior and basal to mid inferolateral walls  Addendum entered by: Allyson Norman MD       No results found for this or any previous visit  This note was completed in part utilizing [employer] direct voice recognition software  Grammatical errors, random word insertion, spelling mistakes, and incomplete sentences may be an occasional consequence of the system secondary to software limitations, ambient noise and hardware issues  At the time of dictation, efforts were made to edit, clarify and /or correct errors  Please read the chart carefully and recognize, using context, where substitutions have occurred    If you have any questions or concerns about the context, text or information contained within the body of this dictation, please contact myself, the provider, for further clarification

## 2020-02-21 NOTE — PROGRESS NOTES
Patient was recently admitted for myocarditis  He had some hypokinesis in the inferior wall  He was discharged in good condition and currently has no cardiac symptoms with the exception of occasional skipped heartbeats  He has a history of anxiety and he has restarted the Lexapro  He says that the anxiety in the skipped heartbeats play off each other fairly strongly  I reviewed the admission history and physical, discharge summary and after visit summary with the patient  Medication list was updated and reflect any changes that were made in the hospital     The patient understands all discharge instructions as listed on the after visit summary  Appointments have been made or will be made with the appropriate physicians for follow-up  Will be seeing Cardiology Tuesday    Review of Systems   Constitutional: Negative for activity change, fatigue and fever  HENT: Negative for congestion, ear discharge, ear pain, postnasal drip, rhinorrhea, sinus pain, sneezing and sore throat  Eyes: Negative for photophobia, pain, discharge and redness  Respiratory: Negative for apnea, cough, shortness of breath and wheezing  Cardiovascular: Positive for palpitations (Occasional skipped beats with no shortness of breath, chest pain or lightheadedness)  Negative for chest pain  Gastrointestinal: Negative for abdominal pain, blood in stool, constipation, diarrhea, nausea and vomiting  Endocrine: Negative for polydipsia, polyphagia and polyuria  Genitourinary: Negative for decreased urine volume, difficulty urinating, discharge, dysuria, frequency, penile pain and urgency  Musculoskeletal: Negative for arthralgias, gait problem, joint swelling and neck pain  Skin: Negative for color change and rash  Neurological: Negative for dizziness, tremors, seizures, weakness and headaches  Psychiatric/Behavioral: Negative for agitation and sleep disturbance  The patient is not nervous/anxious        Past medical history, past surgical history, family medical history, social history, and medication list were all reviewed  /80 (Cuff Size: Standard)   Pulse 68   Resp 16   Ht 5' 8" (1 727 m)   Wt 79 kg (174 lb 3 2 oz)   BMI 26 49 kg/m²     Physical Exam   Constitutional: He is oriented to person, place, and time  Vital signs are normal  He appears well-developed and well-nourished  He is cooperative  HENT:   Head: Normocephalic  Right Ear: Tympanic membrane, external ear and ear canal normal    Left Ear: Tympanic membrane, external ear and ear canal normal    Nose: Nose normal    Mouth/Throat: Uvula is midline, oropharynx is clear and moist and mucous membranes are normal  No oropharyngeal exudate  No tonsillar exudate  Eyes: Pupils are equal, round, and reactive to light  Conjunctivae, EOM and lids are normal    Neck: Trachea normal  Carotid bruit is not present  No thyromegaly present  Cardiovascular: Normal rate, regular rhythm, S1 normal, S2 normal, normal heart sounds and normal pulses  No murmur heard  Pulmonary/Chest: Effort normal and breath sounds normal  No respiratory distress  Abdominal: Soft  Normal appearance and bowel sounds are normal  There is no hepatosplenomegaly  There is no tenderness  No hernia  Lymphadenopathy:     He has no cervical adenopathy  Neurological: He is alert and oriented to person, place, and time  He has normal strength and normal reflexes  No cranial nerve deficit or sensory deficit  Gait normal    Skin: Skin is warm, dry and intact  Psychiatric: He has a normal mood and affect  His speech is normal and behavior is normal  Thought content normal  Cognition and memory are normal    Nursing note and vitals reviewed

## 2020-02-24 ENCOUNTER — PROCEDURE VISIT (OUTPATIENT)
Dept: CARDIOLOGY CLINIC | Facility: CLINIC | Age: 25
End: 2020-02-24

## 2020-02-24 DIAGNOSIS — R00.2 PALPITATIONS: Primary | ICD-10-CM

## 2020-02-24 PROCEDURE — RECHECK: Performed by: INTERNAL MEDICINE

## 2020-02-26 ENCOUNTER — OFFICE VISIT (OUTPATIENT)
Dept: CARDIOLOGY CLINIC | Facility: CLINIC | Age: 25
End: 2020-02-26
Payer: COMMERCIAL

## 2020-02-26 VITALS
DIASTOLIC BLOOD PRESSURE: 80 MMHG | HEART RATE: 68 BPM | BODY MASS INDEX: 26.4 KG/M2 | HEIGHT: 68 IN | OXYGEN SATURATION: 97 % | WEIGHT: 174.2 LBS | SYSTOLIC BLOOD PRESSURE: 130 MMHG

## 2020-02-26 DIAGNOSIS — Q76.0 SPINA BIFIDA OCCULTA: ICD-10-CM

## 2020-02-26 DIAGNOSIS — R00.2 PALPITATIONS: ICD-10-CM

## 2020-02-26 DIAGNOSIS — I31.9 MYOPERICARDITIS: Primary | ICD-10-CM

## 2020-02-26 DIAGNOSIS — Z13.6 SCREENING FOR CARDIOVASCULAR CONDITION: ICD-10-CM

## 2020-02-26 DIAGNOSIS — F41.1 GAD (GENERALIZED ANXIETY DISORDER): ICD-10-CM

## 2020-02-26 PROCEDURE — 1111F DSCHRG MED/CURRENT MED MERGE: CPT | Performed by: NURSE PRACTITIONER

## 2020-02-26 PROCEDURE — 99214 OFFICE O/P EST MOD 30 MIN: CPT | Performed by: NURSE PRACTITIONER

## 2020-02-26 PROCEDURE — 3008F BODY MASS INDEX DOCD: CPT | Performed by: NURSE PRACTITIONER

## 2020-02-26 NOTE — LETTER
February 26, 2020     Kelly Ramires MD  [address] [place] [address]  [address] [place] [address] [facility]    Patient: Alix Gee   YOB: 1995   Date of Visit: 2/26/2020       Dear Dr Jose Eduardo Ireland: Thank you for referring Jatin Coffey to me for evaluation  Below are my notes for this consultation  If you have questions, please do not hesitate to call me  I look forward to following your patient along with you  Sincerely,        DICK Diaz        CC: MD Dwight Blancas CRNP  2/26/2020 11:25 AM  Sign at close encounter  Hospital Follow Up   Office Visit Note  Alix Gee   22 y o    male   MRN: [id number]  [address]  [address] [place] [id number] [phone]    PCP: Kelly Ramires MD  Cardiologist: Will be Dr Herminio Gallo           Assessment/plan  Myopericarditis, with trop to 16, CRP>90  Adm 2/11-2/14/2020  --on a tapering velasco of  ibuprofen and colchicine o 6 mg daily x 3 months total  --c MRI : consistent with myopericarditis  --Xarelto 20 mg daily given spontaneous echo contrast  In the LV cavity, until repeat echo and follow-up with Dr Herminio Gallo next week  Spina bifida occulta  palpitations  Anxiety-on trazodone and escitalopram  Cardiac testing  --TTE 2/12/20  EF60 %  Mild spontaneous echo contrast was noted in the left ventricular cavity  There were mild hypokinesis of the inferior and basal to mid inferolateral walls  RV normal MIld TR  --c MRI- 2/12/2020  1  Top normal left ventricular size with low-normal left ventricular systolic function  2  Normal right ventricular size and systolic function  3  No significant valvular abnormalities  4  Patchy subepicardial fibrosis of the basal to mid inferior and inferolateral walls  This is highly suggestive of myopericarditis           Summary of recommendations  -Heart healthy diet   Educational information provided  -Fasting Lipid profile, for CV screening and CRP prior to the next OV  -He has a follow-up echocardiogram March 9th  -Once anticoagulation has been discontinued, a repeat echocardiogram 2 weeks after that has been recommended to assure there has no further recurrence of spontaneous echo contrast in the LV cavity, while OFF OAC  -Follow up will be scheduled with Dr Eric Wild 3/9/2020            OZZIE Julian is a 22 y o  male   with  spina bifida occulta and anxiety  He has a history of palpitations  He has not had a prior cardiac evaluation He does not smoke  He does not use recreational drugs  He does not drink alcohol in excess  He admits to using  protein supplements  prior to his exercise regimen  He presented to the hospital on 2/11/2020 due to chest pain  Reported intense midsternal chest pain with deep breaths , with radiation up into his neck and down his left arm  He admitted to symptoms of viral gastroenteritis a few days prior, with nausea, vomiting and diarrhea      He had an elevated troponin, initially 13 4, peaking at 16 and trended down  Cardiology was consulted  His EKG showed showed diffuse ST elevations with OH depressions on some of the leads   CRP was over 90  He works a physical job, and performs weight lifting on a routine basis  He does take some supplements prior to his workouts  His lipid status is not known  His echocardiogram showed preserved LV function and inferior hypokinesis  He was diagnosed with myopericarditis, and placed on ibuprofen and colchicine  A Cardiac MRI is pending  However there was spontaneous contrast noted in the LV cavity and he was also started on anticoagulation with Xarelto  Given the need for anticoagulation, ibuprofen dose was decreased to 400 mg t i d  it was recommended to continue anticoagulation until the spontaneous echo contrast resolves      The preliminary MRI report confirms, likely myocarditis involving the inferior and inferolateral walls, consistent with a wall motion abnormality noted on echocardiogram     Per Dr Emir Cope:  Motrin 400 mg 3 times daily for 5 more days - till 2/19/2020  Motrin 400 mg 2 times daily for 1 week-till 2/26/2020  Motrin 200 mg 3 times daily for 1 week-till 3/5/2020  Motrin 200 mg 2 times daily for 1 week-till 3/12/2020       2/26/2020  He presents for hospital follow-up  He feels well, no chest pain no shortness of breath no lightheadedness or dizziness  Soon after he was discharged he did feel intermittent palpitations which have resolved with time  He has been tolerating his medications  He did have some loose bowel/diarrhea however that seemed to have resolved as well  He has been taking the Motrin less frequently than prescribed, because he feels well     I reviewed with him the regimen for Motrin as outlined above, and recommended compliance to decrease recurrence  He is agreeable  Thus far, he has been back at work at [employer], and doing well  He has not yet begun to exercise  I liberated him to begin to exercise, firstly 3 times a week, and gradually increase his tolerance  He is agreeable  I reviewed with him is upcoming appointments and he understands the times and scheduled repeat echocardiogram   He is agreeable to getting a fasting lipid profile for cardiovascular screening and we will also repeat a CRP as we would expect to see improvement as his symptoms have improved  His exam is benign  His blood pressure is 130/80      Assessment  Diagnoses and all orders for this visit:    Myopericarditis    Spina bifida occulta    TROY (generalized anxiety disorder)    Palpitations          No past medical history on file  Review of Systems   Constitution: Negative for chills  Cardiovascular: Negative for chest pain, claudication, cyanosis, dyspnea on exertion, irregular heartbeat, leg swelling, near-syncope, orthopnea, palpitations, paroxysmal nocturnal dyspnea and syncope     Respiratory: Negative for cough and shortness of breath  Gastrointestinal: Negative for heartburn and nausea  Neurological: Negative for dizziness, focal weakness, headaches, light-headedness and weakness  All other systems reviewed and are negative  No Known Allergies        Current Outpatient Medications:     colchicine (COLCRYS) 0 6 mg tablet, Take 1 tablet (0 6 mg total) by mouth daily, Disp: 30 tablet, Rfl: 0    escitalopram (LEXAPRO) 10 mg tablet, Take 1 tablet (10 mg total) by mouth daily, Disp: 90 tablet, Rfl: 1    ibuprofen (MOTRIN) 200 mg tablet, Take 2 tablets (400 mg total) by mouth every 8 (eight) hours Take 400 mg 3x a day for 5 days, then 400 mg 2x a day for a week, then 200 mg 3x a day for 1 week, then 200mg 2x a day for 1 week , Disp: 93 tablet, Rfl: 0    pantoprazole (PROTONIX) 20 mg tablet, Take 1 tablet (20 mg total) by mouth daily, Disp: 30 tablet, Rfl: 0    rivaroxaban (XARELTO) 20 mg tablet, Take 1 tablet (20 mg total) by mouth daily with dinner, Disp: 30 tablet, Rfl: 0    traZODone (DESYREL) 50 mg tablet, TAKE 1-2 TABLETS ( MG TOTAL) BY MOUTH DAILY AT BEDTIME, Disp: 60 tablet, Rfl: 1        Social History     Socioeconomic History    Marital status: Single     Spouse name: Not on file    Number of children: Not on file    Years of education: Not on file    Highest education level: Not on file   Occupational History    Not on file   Social Needs    Financial resource strain: Not on file    Food insecurity:     Worry: Not on file     Inability: Not on file    Transportation needs:     Medical: Not on file     Non-medical: Not on file   Tobacco Use    Smoking status: Current Some Day Smoker    Smokeless tobacco: Never Used   Substance and Sexual Activity    Alcohol use: Yes     Comment: Socially    Drug use: No    Sexual activity: Not on file   Lifestyle    Physical activity:     Days per week: Not on file     Minutes per session: Not on file    Stress: Not on file   Relationships    Social connections: Talks on phone: Not on file     Gets together: Not on file     Attends Orthodoxy service: Not on file     Active member of club or organization: Not on file     Attends meetings of clubs or organizations: Not on file     Relationship status: Not on file    Intimate partner violence:     Fear of current or ex partner: Not on file     Emotionally abused: Not on file     Physically abused: Not on file     Forced sexual activity: Not on file   Other Topics Concern    Not on file   Social History Narrative    Caffeine use       Family History   Problem Relation Age of Onset    No Known Problems Mother     No Known Problems Father     ADD / ADHD Sister        Physical Exam   Constitutional: He is oriented to person, place, and time  No distress  HENT:   Head: Normocephalic and atraumatic  Eyes: Conjunctivae and EOM are normal    Neck: Normal range of motion  Neck supple  Cardiovascular: Normal rate, regular rhythm, normal heart sounds and intact distal pulses  Pulmonary/Chest: Effort normal and breath sounds normal    Abdominal: Soft  Bowel sounds are normal    Musculoskeletal: Normal range of motion  Neurological: He is alert and oriented to person, place, and time  Skin: Skin is warm and dry  Psychiatric: He has a normal mood and affect  Nursing note and vitals reviewed  Vitals: There were no vitals taken for this visit     Wt Readings from Last 3 Encounters:   02/21/20 79 kg (174 lb 3 2 oz)   02/11/20 84 kg (185 lb 3 oz)   02/12/20 83 9 kg (185 lb)         Labs & Results:  Lab Results   Component Value Date    WBC 6 24 02/12/2020    HGB 13 3 02/12/2020    HCT 39 2 02/12/2020    MCV 87 02/12/2020     02/12/2020     No results found for: BNP  No components found for: CHEM  Troponin I   Date Value Ref Range Status   02/12/2020 10 88 (H) <=0 04 ng/mL Final     Comment:       [employer] Chemistry analyzer 99% cutoff is > 0 04 ng/mL in network labs     o cTnI 99% cutoff is useful only when applied to patients in the clinical setting of myocardial ischemia   o cTnI 99% cutoff should be interpreted in the context of clinical history, ECG findings and possibly cardiac imaging to establish correct diagnosis  o cTnI 99% cutoff may be suggestive but clearly not indicative of a coronary event without the clinical setting of myocardial ischemia      2020 14 30 (H) <=0 04 ng/mL Final     Comment:       S[employer]ens Chemistry analyzer 99% cutoff is > 0 04 ng/mL in network labs     o cTnI 99% cutoff is useful only when applied to patients in the clinical setting of myocardial ischemia   o cTnI 99% cutoff should be interpreted in the context of clinical history, ECG findings and possibly cardiac imaging to establish correct diagnosis  o cTnI 99% cutoff may be suggestive but clearly not indicative of a coronary event without the clinical setting of myocardial ischemia      2020 16 20 (H) <=0 04 ng/mL Final     Comment:       Siemens Chemistry analyzer 99% cutoff is > 0 04 ng/mL in network labs     o cTnI 99% cutoff is useful only when applied to patients in the clinical setting of myocardial ischemia   o cTnI 99% cutoff should be interpreted in the context of clinical history, ECG findings and possibly cardiac imaging to establish correct diagnosis  o cTnI 99% cutoff may be suggestive but clearly not indicative of a coronary event without the clinical setting of myocardial ischemia         Results for orders placed during the hospital encounter of 20   Echo complete with contrast if indicated    Narrative [place] [address] [address] [place] [address]  [phone]    Transthoracic Echocardiogram  2D, M-mode, Doppler, and Color Doppler    Study date:  2020    Patient: Adonis Vargas  MR number: [id number]  Account number: [de-identified]  : 1995  Age: 22 years  Gender: Male  Status: Inpatient  Location: Bedside  Height: 68 in  Weight: 184 6 lb  BP: 115/ 65 mmHg    Indications: Assess left ventricular function  Diagnoses: R07 9 - Chest pain, unspecified    Sonographer:  NEAL Khan  Primary Physician:  James Stark MD  Referring Physician:  Marika Walker PA-C  Group:  [id number]  Interpreting Physician:  Andrew Olivo MD    SUMMARY    LEFT VENTRICLE:  The ventricle was moderately dilated  Systolic function was normal by visual assessment  Ejection fraction was estimated to be 60 %  Mild spontaneous echo contrast was noted in the left ventricular cavity  There were no regional wall motion abnormalities  RIGHT VENTRICLE:  The size was normal   Systolic function was normal     TRICUSPID VALVE:  There was mild regurgitation  PULMONIC VALVE:  There was mild regurgitation  HISTORY: PRIOR HISTORY: Chest pain, Cardiomegaly, Myocarditis, Anxiety    PROCEDURE: The procedure was performed at the bedside  This was a routine study  The transthoracic approach was used  The study included complete 2D imaging, M-mode, complete spectral Doppler, and color Doppler  The heart rate was 49 bpm,  at the start of the study  Images were obtained from the parasternal, apical, subcostal, and suprasternal notch acoustic windows  Image quality was good  LEFT VENTRICLE: The ventricle was moderately dilated  Systolic function was normal by visual assessment  Ejection fraction was estimated to be 60 %  Mild spontaneous echo contrast was noted in the left ventricular cavity  There were no regional wall motion abnormalities  Wall thickness was normal  DOPPLER: Left ventricular diastolic function parameters were normal     RIGHT VENTRICLE: The size was normal  Systolic function was normal  Wall thickness was normal     LEFT ATRIUM: Size was at the upper limits of normal     RIGHT ATRIUM: Size was at the upper limits of normal     MITRAL VALVE: Valve structure was normal  There was normal leaflet separation   DOPPLER: The transmitral velocity was within the normal range  There was no evidence for stenosis  There was no significant regurgitation  AORTIC VALVE: The valve was trileaflet  Leaflets exhibited normal thickness and normal cuspal separation  DOPPLER: Transaortic velocity was within the normal range  There was no evidence for stenosis  There was no significant  regurgitation  TRICUSPID VALVE: The valve structure was normal  There was normal leaflet separation  DOPPLER: The transtricuspid velocity was within the normal range  There was no evidence for stenosis  There was mild regurgitation  PULMONIC VALVE: Leaflets exhibited normal thickness, no calcification, and normal cuspal separation  DOPPLER: The transpulmonic velocity was within the normal range  There was mild regurgitation  PERICARDIUM: There was no pericardial effusion  The pericardium was normal in appearance  AORTA: The root exhibited normal size  SYSTEMIC VEINS: IVC: The inferior vena cava was normal in size  SYSTEM MEASUREMENT TABLES    2D  %FS: 28 3 %  Ao Diam: 2 97 cm  EDV(Teich): 139 63 ml  EF(Teich): 54 17 %  ESV(Teich): 63 99 ml  IVSd: 0 81 cm  LA Area: 22 1 cm2  LA Diam: 3 97 cm  LVEDV MOD A4C: 182 39 ml  LVEF MOD A4C: 56 05 %  LVESV MOD A4C: 80 16 ml  LVIDd: 5 37 cm  LVIDs: 3 85 cm  LVLd A4C: 9 8 cm  LVLs A4C: 7 95 cm  LVPWd: 0 85 cm  RA Area: 16 86 cm2  RVIDd: 4 15 cm  SV MOD A4C: 102 23 ml  SV(Teich): 75 64 ml    PW  E': 0 15 m/s  E/E': 6 26  MV A Cliff: 0 47 m/s  MV Dec McIntosh: 5 52 m/s2  MV DecT: 167 59 ms  MV E Cliff: 0 93 m/s  MV E/A Ratio: 1 97  MV PHT: 48 6 ms  MVA By PHT: 4 53 cm2    [employer] Accredited Echocardiography Laboratory    Prepared and electronically signed by    Miller Redman MD  Signed 12-Feb-2020 17:17:25    Addendum Date: 2/14/2020 8:31:56 AM  There was mild hypokinesis of the inferior and basal to mid inferolateral walls  Addendum entered by: Miller Redman MD       No results found for this or any previous visit      This note was completed in part utilizing [employer] direct voice recognition software  Grammatical errors, random word insertion, spelling mistakes, and incomplete sentences may be an occasional consequence of the system secondary to software limitations, ambient noise and hardware issues  At the time of dictation, efforts were made to edit, clarify and /or correct errors  Please read the chart carefully and recognize, using context, where substitutions have occurred    If you have any questions or concerns about the context, text or information contained within the body of this dictation, please contact myself, the provider, for further clarification

## 2020-02-26 NOTE — PATIENT INSTRUCTIONS
Motrin 200 mg 3 times daily for 1 week-till 3/5/2020  Motrin 200 mg 2 times daily for 1 week-till 3/12/2020

## 2020-02-27 ENCOUNTER — HOSPITAL ENCOUNTER (OUTPATIENT)
Dept: NON INVASIVE DIAGNOSTICS | Facility: CLINIC | Age: 25
Discharge: HOME | End: 2020-02-27
Payer: COMMERCIAL

## 2020-02-27 DIAGNOSIS — R00.2 PALPITATIONS: ICD-10-CM

## 2020-02-27 PROCEDURE — 93226 XTRNL ECG REC<48 HR SCAN A/R: CPT

## 2020-02-27 PROCEDURE — 93225 XTRNL ECG REC<48 HRS REC: CPT

## 2020-02-28 ENCOUNTER — TELEPHONE (OUTPATIENT)
Dept: CARDIOLOGY CLINIC | Facility: CLINIC | Age: 25
End: 2020-02-28

## 2020-02-28 PROCEDURE — 93227 XTRNL ECG REC<48 HR R&I: CPT | Performed by: INTERNAL MEDICINE

## 2020-02-28 NOTE — TELEPHONE ENCOUNTER
----- Message from Atiya Perez MD sent at 2/28/2020  9:13 AM EST -----  Please let the pt know that holter monitoring was good  No changes at this time

## 2020-03-05 ENCOUNTER — HOSPITAL ENCOUNTER (OUTPATIENT)
Dept: NON INVASIVE DIAGNOSTICS | Facility: CLINIC | Age: 25
Discharge: HOME | End: 2020-03-05
Payer: COMMERCIAL

## 2020-03-05 DIAGNOSIS — R00.2 PALPITATION: ICD-10-CM

## 2020-03-05 PROCEDURE — 93306 TTE W/DOPPLER COMPLETE: CPT

## 2020-03-09 ENCOUNTER — OFFICE VISIT (OUTPATIENT)
Dept: CARDIOLOGY CLINIC | Facility: CLINIC | Age: 25
End: 2020-03-09
Payer: COMMERCIAL

## 2020-03-09 ENCOUNTER — DOCUMENTATION (OUTPATIENT)
Dept: CARDIOLOGY CLINIC | Facility: CLINIC | Age: 25
End: 2020-03-09

## 2020-03-09 VITALS
HEIGHT: 68 IN | WEIGHT: 175.4 LBS | SYSTOLIC BLOOD PRESSURE: 146 MMHG | HEART RATE: 83 BPM | DIASTOLIC BLOOD PRESSURE: 78 MMHG | OXYGEN SATURATION: 97 % | BODY MASS INDEX: 26.58 KG/M2

## 2020-03-09 DIAGNOSIS — I31.9 MYOPERICARDITIS: ICD-10-CM

## 2020-03-09 DIAGNOSIS — R00.2 PALPITATIONS: Primary | ICD-10-CM

## 2020-03-09 PROCEDURE — 1111F DSCHRG MED/CURRENT MED MERGE: CPT | Performed by: INTERNAL MEDICINE

## 2020-03-09 PROCEDURE — 3008F BODY MASS INDEX DOCD: CPT | Performed by: INTERNAL MEDICINE

## 2020-03-09 PROCEDURE — 99214 OFFICE O/P EST MOD 30 MIN: CPT | Performed by: INTERNAL MEDICINE

## 2020-03-09 NOTE — PROGRESS NOTES
TO [facility] IT MAY CONCERN    HEART & VASCULAR [facility] [place]  [address] [facility] [place]  [address] [facility] [address]      NAME: Arlene Argueta (YOB: 1995)    Arlene Argueta is under my care, he was diagnosed with acute myopericarditis on February 11th 2020 and initiated on ibuprofen and colchicine  Colchicine is the only medication that has been shown to prevent recurrences of myopericarditis  He will continue colchicine at 0 6 mg daily till the end of April 2020 (needs 60 more pills)  Please contact me with any questions        Sincerely,    MD Rex Garrison  [facility]

## 2020-03-09 NOTE — PROGRESS NOTES
Cardiology Follow Up    Devin Mullen  1995  [id number]  [facility] - [place], [place] [facility]  [address] [id number] [phone]    No diagnosis found  There are no diagnoses linked to this encounter  I had the pleasure of seeing Devin Mullen for a follow up visit  INTERVAL HISTORY:  Feels better    History of the presenting illness, Discussion/Summary and My Plan are as follows:::    52-year-old with no prior cardiac history, admitted with viral gastroenteritis symptoms of couple of days duration as well as chest pains, with a peak troponin of 16, with ECG showing diffuse ST elevations with an elevated CRP at 90, overall clinical picture suggested acute pericarditis, with echo as well as cardiac MRI showing inferior and inferolateral wall involvement  Echo in the hospital also showed mild spontaneous echo contrast even though LV function was normal     Started him on Motrin with complete relief of symptoms  Also on colchicine at this time  Underwent another repeat echo on 3/6/2020 which demonstrated normalized wall motion in the inferior and inferolateral wall as well as resolution of the spontaneous echo contrast     Had brief palpitations in the interim, Holter was unremarkable  Plan:    Acute myopericarditis:  Can stop Motrin-has used it for 1 month, currently at 200 mg twice daily  Continue colchicine for 2 more months-till the end of April  Preauthorization letter will be sent  Discontinue Xarelto  Recheck echo in 2 weeks to ensure that smoke is not really appearing  Mildly elevated blood pressures:  Watch-off Motrin  Does have a family history of hypertension in his maternal grandparents but not his mom  Follow-up in 6 months      Patient Active Problem List   Diagnosis    Palpitations    Spina bifida occulta    TROY (generalized anxiety disorder)    Other chest pain  Hypokalemia     History reviewed  No pertinent past medical history  Social History     Socioeconomic History    Marital status: Single     Spouse name: Not on file    Number of children: Not on file    Years of education: Not on file    Highest education level: Not on file   Occupational History    Not on file   Social Needs    Financial resource strain: Not on file    Food insecurity:     Worry: Not on file     Inability: Not on file    Transportation needs:     Medical: Not on file     Non-medical: Not on file   Tobacco Use    Smoking status: Current Some Day Smoker    Smokeless tobacco: Current User     Types: Chew   Substance and Sexual Activity    Alcohol use: Yes     Comment: Socially    Drug use: No    Sexual activity: Not on file   Lifestyle    Physical activity:     Days per week: Not on file     Minutes per session: Not on file    Stress: Not on file   Relationships    Social connections:     Talks on phone: Not on file     Gets together: Not on file     Attends [employer] service: Not on file     Active member of club or organization: Not on file     Attends meetings of clubs or organizations: Not on file     Relationship status: Not on file    Intimate partner violence:     Fear of current or ex partner: Not on file     Emotionally abused: Not on file     Physically abused: Not on file     Forced sexual activity: Not on file   Other Topics Concern    Not on file   Social History Narrative    Caffeine use      Family History   Problem Relation Age of Onset    No Known Problems Mother     No Known Problems Father     ADD / ADHD Sister      History reviewed  No pertinent surgical history      Current Outpatient Medications:     colchicine (COLCRYS) 0 6 mg tablet, Take 1 tablet (0 6 mg total) by mouth daily, Disp: 30 tablet, Rfl: 0    escitalopram (LEXAPRO) 10 mg tablet, Take 1 tablet (10 mg total) by mouth daily, Disp: 90 tablet, Rfl: 1    ibuprofen (MOTRIN) 200 mg tablet, Take 2 tablets (400 mg total) by mouth every 8 (eight) hours Take 400 mg 3x a day for 5 days, then 400 mg 2x a day for a week, then 200 mg 3x a day for 1 week, then 200mg 2x a day for 1 week , Disp: 93 tablet, Rfl: 0    pantoprazole (PROTONIX) 20 mg tablet, Take 1 tablet (20 mg total) by mouth daily, Disp: 30 tablet, Rfl: 0    rivaroxaban (XARELTO) 20 mg tablet, Take 1 tablet (20 mg total) by mouth daily with dinner, Disp: 30 tablet, Rfl: 0    traZODone (DESYREL) 50 mg tablet, TAKE 1-2 TABLETS ( MG TOTAL) BY MOUTH DAILY AT BEDTIME, Disp: 60 tablet, Rfl: 1  No Known Allergies    Imaging: Cta Head And Neck With And Without Contrast    Result Date: 2/11/2020  Narrative: CTA NECK AND BRAIN WITH AND WITHOUT CONTRAST INDICATION: Neck pain COMPARISON:   7/29/2019  TECHNIQUE:  Routine CT imaging of the Brain without contrast   Post contrast imaging was performed after administration of iodinated contrast through the neck and brain  Post contrast axial 0 625 mm images timed to opacify the arterial system  3D rendering was performed on an independent workstation  MIP reconstructions performed  Coronal reconstructions were performed of the noncontrast portion of the brain  Radiation dose length product (DLP) for this visit:  763 mGy-cm   This examination, like all CT scans performed in the [facility], was performed utilizing techniques to minimize radiation dose exposure, including the use of iterative reconstruction and automated exposure control  IV Contrast:  85 mL of iohexol (OMNIPAQUE)  IMAGE QUALITY:   Diagnostic FINDINGS: NONCONTRAST BRAIN PARENCHYMA:  No evidence of acute vascular territorial infarction, intracranial hemorrhage or mass effect  VENTRICLES AND EXTRA-AXIAL SPACES:  No hydrocephalus or extra-axial collection  VISUALIZED ORBITS AND PARANASAL SINUSES:  Intact globes and orbits  Mild mucosal thickening in the right maxillary sinus, chronic    The CERVICAL VASCULATURE AORTIC ARCH AND GREAT VESSELS:  Three-vessel comparison the aortic arch  No stenosis in the subclavian arteries  RIGHT VERTEBRAL ARTERY CERVICAL SEGMENT:  Normal origin  The vessel is normal in caliber throughout the neck  LEFT VERTEBRAL ARTERY CERVICAL SEGMENT:  Normal origin  The vessel is normal in caliber throughout the neck  RIGHT EXTRACRANIAL CAROTID SEGMENT:  Normal caliber common carotid artery  Normal bifurcation and cervical internal carotid artery  No stenosis or dissection  LEFT EXTRACRANIAL CAROTID SEGMENT:  Normal caliber common carotid artery  Normal bifurcation and cervical internal carotid artery  No stenosis or dissection  NASCET criteria was used to determine the degree of internal carotid artery diameter stenosis  INTRACRANIAL VASCULATURE INTERNAL CAROTID ARTERIES:  Normal enhancement of the intracranial portions of the internal carotid arteries  Normal ophthalmic artery origins  Normal ICA terminus  ANTERIOR CIRCULATION:  Symmetric A1 segments and anterior cerebral arteries with normal enhancement  Normal anterior communicating artery  MIDDLE CEREBRAL ARTERY CIRCULATION:  M1 segment and middle cerebral artery branches demonstrate normal enhancement bilaterally  DISTAL VERTEBRAL ARTERIES:  Normal distal vertebral arteries  Posterior inferior cerebellar artery origins are normal  Normal vertebral basilar junction  BASILAR ARTERY:  Basilar artery is normal in caliber  Normal superior cerebellar arteries  POSTERIOR CEREBRAL ARTERIES: Fetal type right and nearly fetal type left posterior cerebral arteries without stenosis  DURAL VENOUS SINUSES:  Patent  NON VASCULAR ANATOMY BONY STRUCTURES:  No lytic or blastic lesion  SOFT TISSUES OF THE NECK:  No mass or lymphadenopathy  THORACIC INLET:  Clear lung apices  Impression: 1  No hemodynamically significant stenosis, dissection or occlusion of the carotid or vertebral arteries or major vessels of the [place]   2   No evidence of intracranial hemorrhage, infarct or mass effect  Workstation performed: [id number]     Xr Chest 2 Views    Result Date: 2/11/2020  Narrative: CHEST INDICATION:   Chest Pain  COMPARISON:  11/27/2010 EXAM PERFORMED/VIEWS:  XR CHEST PA & LATERAL FINDINGS: Cardiomediastinal silhouette appears unremarkable  The lungs are clear  No pneumothorax or pleural effusion  Osseous structures appear within normal limits for patient age  Impression: No acute cardiopulmonary disease  Workstation performed: [id number]     Mri Cardiac  W Wo Contrast    Result Date: 2/16/2020  Narrative: 22year old man for evaluation for myocarditis  Technique: 1  3 plane SSFP localizers  2  SSFP cine imaging in long, short, and axial planes  3  T1 weighted DIR FSE without fat saturation and T1 weighted TIR FSE in axial plane  4  18 ml gadobutrol power injected  5  2D inversion recovery FGRE for delayed myocardial enhancement  6  The patient tolerated the procedure well without complication  Measurements: Efren-septal wall 11 mm Postero-lateral wall 9 mm Left ventricular end-diastolic dimension 55 mm Left ventricular end-systolic dimension 45 mm Left ventricular end-diastolic volume 716 ml Left ventricular end-systolic volume  546 ml Stroke volume 111 ml Cardiac Output 6 8 L/min Ejection fraction 52 % Left atrium 36 mm Aortic Root 29 mm Findings:  1  The left ventricle is top normal in size with top normal wall thickness  Left ventricular systolic function is low-normal with a measured ejection fraction of 52%  There is mild hypokinesis of the mid inferior wall  2  The right ventricle is normal in size with normal right ventricular systolic function  3  The aortic, mitral, and tricuspid valves open without restriction  There is no significant valvular regurgitation, however cine MRI is inaccurate in the qualitative assessment of valvular regurgitation  4  The left atrium is normal in size  The aortic root is normal in size   5  There is no evidence of fibrofatty infiltration into the right ventricular myocardium  6  Delayed post-gadolinium imaging demonstrates patchy subepicardial hyperenhancement of the basal to mid inferior and inferolateral walls  Impression: Impression: 1  Top normal left ventricular size with low-normal left ventricular systolic function  2  Normal right ventricular size and systolic function  3  No significant valvular abnormalities  4  Patchy subepicardial fibrosis of the basal to mid inferior and inferolateral walls  This is highly suggestive of myopericarditis  Workstation performed: [id number]       Review of Systems:  Review of Systems   Constitutional: Negative  HENT: Negative  Respiratory: Negative  Cardiovascular: Negative  Musculoskeletal: Negative  Neurological: Negative  Physical Exam:  /78 (BP Location: Left arm, Patient Position: Sitting, Cuff Size: Large)   Pulse 83   Ht 5' 8" (1 727 m)   Wt 79 6 kg (175 lb 6 4 oz)   SpO2 97%   BMI 26 67 kg/m²   Physical Exam   Constitutional: He appears well-developed and well-nourished  No distress  HENT:   Head: Normocephalic and atraumatic  Eyes: Pupils are equal, round, and reactive to light  Conjunctivae are normal  Right eye exhibits no discharge  Left eye exhibits no discharge  No scleral icterus  Neck: Normal range of motion  No tracheal deviation present  No thyromegaly present  Cardiovascular: Normal rate and regular rhythm  Exam reveals no friction rub  No murmur heard  Pulmonary/Chest: Effort normal  No stridor  No respiratory distress  Abdominal: Soft  Bowel sounds are normal  He exhibits no distension  There is no tenderness  Musculoskeletal: Normal range of motion  He exhibits no edema or deformity  Skin: Skin is warm  No rash noted  He is not diaphoretic  No erythema  No pallor

## 2020-03-12 ENCOUNTER — DOCUMENTATION (OUTPATIENT)
Dept: CARDIOLOGY CLINIC | Facility: CLINIC | Age: 25
End: 2020-03-12

## 2020-03-13 NOTE — PROGRESS NOTES
Colchicine is denied patient needs to fail Colcrys    Will call patient & let him know you are on vaction

## 2020-03-17 PROCEDURE — 93306 TTE W/DOPPLER COMPLETE: CPT | Performed by: INTERNAL MEDICINE

## 2020-03-17 NOTE — PROGRESS NOTES
He can use either Colchicine or Colcrys  One is generic and the other is brand  If Colcrys is approved, that's fine

## 2020-03-18 NOTE — PROGRESS NOTES
The dose (0 6 mg) is in my note and in a separate insurance authorization letter that I created less than 2 weeks  If only you had looked, this would not have delayed things by creating a new task and reply, by a few more days  And this is not the 1st time this has happened

## 2020-03-23 DIAGNOSIS — F41.1 GAD (GENERALIZED ANXIETY DISORDER): ICD-10-CM

## 2020-03-23 RX ORDER — ESCITALOPRAM OXALATE 10 MG/1
10 TABLET ORAL DAILY
Qty: 90 TABLET | Refills: 1 | Status: SHIPPED | OUTPATIENT
Start: 2020-03-23 | End: 2021-01-12 | Stop reason: SDUPTHER

## 2020-03-27 DIAGNOSIS — F51.01 PRIMARY INSOMNIA: ICD-10-CM

## 2020-03-27 RX ORDER — TRAZODONE HYDROCHLORIDE 50 MG/1
50-100 TABLET ORAL
Qty: 60 TABLET | Refills: 1 | Status: SHIPPED | OUTPATIENT
Start: 2020-03-27 | End: 2020-05-18 | Stop reason: SDUPTHER

## 2020-03-30 ENCOUNTER — NURSE TRIAGE (OUTPATIENT)
Dept: OTHER | Facility: OTHER | Age: 25
End: 2020-03-30

## 2020-03-31 NOTE — TELEPHONE ENCOUNTER
Reason for Disposition   [1] Caller concerned that exposure to COVID-19 occurred BUT [2] does not meet COVID-19 EXPOSURE criteria from [facility]    Answer Assessment - Initial Assessment Questions  1  CONFIRMED CASE: "Who is the person with the confirmed COVID-19 infection that you were exposed to?"      Unknown    2  PLACE of CONTACT: "Where were you when you were exposed to COVID-19  (coronavirus disease 2019)?" (e g , city, state, country)      None    3  TYPE of CONTACT: "How much contact was there?" (e g , live in same house, work in same office, same school)      Unknown    4  DATE of CONTACT: "When did you have contact with a coronavirus patient?" (e g , days)      Unknown    5  DURATION of CONTACT: "How long were you in contact with the COVID-19 (coronavirus disease) patient?" (e g , a few seconds, passed by person, a few minutes, live with the patient)     Unknown    6  SYMPTOMS: "Do you have any symptoms?" (e g , fever, cough, breathing difficulty)      Fever started this morning, highest 103, orally  No cough, no SOB or difficulty breathing  Body aches and diarrhea  8  HIGH RISK: "Do you have any heart or lung problems?  Do you have a weakened immune system?" (e g , CHF, COPD, asthma, HIV positive, chemotherapy, renal failure, diabetes mellitus, sickle cell anemia)     Dx with Myocarditis in February and is on medication    Protocols used: CORONAVIRUS (COVID-19) EXPOSURE-ADULT-

## 2020-03-31 NOTE — TELEPHONE ENCOUNTER
Regarding: covid-19 concern  ----- Message from Star Rankin sent at 3/30/2020  9:51 PM EDT -----  "woke up achey, cold have a fever 102, 101, then 100 0 "

## 2020-03-31 NOTE — TELEPHONE ENCOUNTER
Regarding: Alfred Cunningham  ----- Message from Meri Del Cid sent at 3/30/2020  9:41 PM EDT -----  Fever: Temp has been ranging from   Cough: No  SOB: No  Body aches, cold    Recent travel outside the country: No  Exposed to anyone positive for coronavirus: No

## 2020-05-18 DIAGNOSIS — F51.01 PRIMARY INSOMNIA: ICD-10-CM

## 2020-05-18 RX ORDER — TRAZODONE HYDROCHLORIDE 50 MG/1
50-100 TABLET ORAL
Qty: 60 TABLET | Refills: 1 | Status: SHIPPED | OUTPATIENT
Start: 2020-05-18 | End: 2020-06-22

## 2020-05-21 ENCOUNTER — HOSPITAL ENCOUNTER (OUTPATIENT)
Dept: NON INVASIVE DIAGNOSTICS | Facility: CLINIC | Age: 25
Discharge: HOME | End: 2020-05-21
Payer: COMMERCIAL

## 2020-05-21 DIAGNOSIS — I31.9 MYOPERICARDITIS: ICD-10-CM

## 2020-05-21 DIAGNOSIS — R00.2 PALPITATIONS: ICD-10-CM

## 2020-05-21 PROCEDURE — 93321 DOPPLER ECHO F-UP/LMTD STD: CPT | Performed by: INTERNAL MEDICINE

## 2020-05-21 PROCEDURE — 93308 TTE F-UP OR LMTD: CPT

## 2020-05-21 PROCEDURE — 93308 TTE F-UP OR LMTD: CPT | Performed by: INTERNAL MEDICINE

## 2020-05-21 PROCEDURE — 93325 DOPPLER ECHO COLOR FLOW MAPG: CPT | Performed by: INTERNAL MEDICINE

## 2020-05-22 ENCOUNTER — TELEPHONE (OUTPATIENT)
Dept: CARDIOLOGY CLINIC | Facility: CLINIC | Age: 25
End: 2020-05-22

## 2020-06-19 DIAGNOSIS — F51.01 PRIMARY INSOMNIA: ICD-10-CM

## 2020-06-22 RX ORDER — TRAZODONE HYDROCHLORIDE 50 MG/1
TABLET ORAL
Qty: 60 TABLET | Refills: 1 | Status: SHIPPED | OUTPATIENT
Start: 2020-06-22 | End: 2020-10-27 | Stop reason: SDUPTHER

## 2020-10-27 ENCOUNTER — TELEPHONE (OUTPATIENT)
Dept: FAMILY MEDICINE CLINIC | Facility: MEDICAL CENTER | Age: 25
End: 2020-10-27

## 2020-10-27 DIAGNOSIS — F51.01 PRIMARY INSOMNIA: ICD-10-CM

## 2020-10-27 RX ORDER — TRAZODONE HYDROCHLORIDE 50 MG/1
50-100 TABLET ORAL
Qty: 60 TABLET | Refills: 0 | Status: SHIPPED | OUTPATIENT
Start: 2020-10-27 | End: 2021-01-29

## 2020-10-27 RX ORDER — TRAZODONE HYDROCHLORIDE 50 MG/1
50-100 TABLET ORAL
Qty: 60 TABLET | Refills: 1 | Status: CANCELLED | OUTPATIENT
Start: 2020-10-27

## 2020-11-19 ENCOUNTER — TELEPHONE (OUTPATIENT)
Dept: UROLOGY | Facility: MEDICAL CENTER | Age: 25
End: 2020-11-19

## 2020-11-19 ENCOUNTER — OFFICE VISIT (OUTPATIENT)
Dept: FAMILY MEDICINE CLINIC | Facility: MEDICAL CENTER | Age: 25
End: 2020-11-19
Payer: COMMERCIAL

## 2020-11-19 VITALS
HEIGHT: 68 IN | WEIGHT: 189 LBS | BODY MASS INDEX: 28.64 KG/M2 | DIASTOLIC BLOOD PRESSURE: 80 MMHG | TEMPERATURE: 97.3 F | SYSTOLIC BLOOD PRESSURE: 142 MMHG | HEART RATE: 88 BPM | RESPIRATION RATE: 16 BRPM

## 2020-11-19 DIAGNOSIS — B07.8 COMMON WART: ICD-10-CM

## 2020-11-19 DIAGNOSIS — A63.0 CONDYLOMA ACUMINATUM IN MALE: Primary | ICD-10-CM

## 2020-11-19 DIAGNOSIS — Z23 ENCOUNTER FOR IMMUNIZATION: ICD-10-CM

## 2020-11-19 PROCEDURE — 3008F BODY MASS INDEX DOCD: CPT | Performed by: FAMILY MEDICINE

## 2020-11-19 PROCEDURE — 90471 IMMUNIZATION ADMIN: CPT | Performed by: FAMILY MEDICINE

## 2020-11-19 PROCEDURE — 3725F SCREEN DEPRESSION PERFORMED: CPT | Performed by: FAMILY MEDICINE

## 2020-11-19 PROCEDURE — 99213 OFFICE O/P EST LOW 20 MIN: CPT | Performed by: FAMILY MEDICINE

## 2020-11-19 PROCEDURE — 90651 9VHPV VACCINE 2/3 DOSE IM: CPT | Performed by: FAMILY MEDICINE

## 2020-11-29 ENCOUNTER — APPOINTMENT (EMERGENCY)
Dept: RADIOLOGY | Facility: HOSPITAL | Age: 25
End: 2020-11-29
Payer: COMMERCIAL

## 2020-11-29 ENCOUNTER — HOSPITAL ENCOUNTER (EMERGENCY)
Facility: HOSPITAL | Age: 25
Discharge: HOME | End: 2020-11-29
Attending: EMERGENCY MEDICINE | Admitting: EMERGENCY MEDICINE
Payer: COMMERCIAL

## 2020-11-29 VITALS
RESPIRATION RATE: 16 BRPM | SYSTOLIC BLOOD PRESSURE: 144 MMHG | WEIGHT: 189 LBS | BODY MASS INDEX: 28.74 KG/M2 | HEART RATE: 77 BPM | DIASTOLIC BLOOD PRESSURE: 85 MMHG | TEMPERATURE: 98.3 F | OXYGEN SATURATION: 95 %

## 2020-11-29 DIAGNOSIS — F41.9 ANXIETY: Primary | ICD-10-CM

## 2020-11-29 DIAGNOSIS — R07.89 ATYPICAL CHEST PAIN: ICD-10-CM

## 2020-11-29 LAB
ALBUMIN SERPL BCP-MCNC: 4.1 G/DL (ref 3.5–5)
ALP SERPL-CCNC: 77 U/L (ref 46–116)
ALT SERPL W P-5'-P-CCNC: 76 U/L (ref 12–78)
ANION GAP SERPL CALCULATED.3IONS-SCNC: 6 MMOL/L (ref 4–13)
AST SERPL W P-5'-P-CCNC: 42 U/L (ref 5–45)
BASOPHILS # BLD AUTO: 0.04 THOUSANDS/ΜL (ref 0–0.1)
BASOPHILS NFR BLD AUTO: 1 % (ref 0–1)
BILIRUB SERPL-MCNC: 0.36 MG/DL (ref 0.2–1)
BUN SERPL-MCNC: 16 MG/DL (ref 5–25)
CALCIUM SERPL-MCNC: 9.6 MG/DL (ref 8.3–10.1)
CHLORIDE SERPL-SCNC: 99 MMOL/L (ref 100–108)
CO2 SERPL-SCNC: 30 MMOL/L (ref 21–32)
CREAT SERPL-MCNC: 1.21 MG/DL (ref 0.6–1.3)
EOSINOPHIL # BLD AUTO: 0.12 THOUSAND/ΜL (ref 0–0.61)
EOSINOPHIL NFR BLD AUTO: 1 % (ref 0–6)
ERYTHROCYTE [DISTWIDTH] IN BLOOD BY AUTOMATED COUNT: 12.1 % (ref 11.6–15.1)
GFR SERPL CREATININE-BSD FRML MDRD: 83 ML/MIN/1.73SQ M
GLUCOSE SERPL-MCNC: 115 MG/DL (ref 65–140)
HCT VFR BLD AUTO: 44.2 % (ref 36.5–49.3)
HGB BLD-MCNC: 14.6 G/DL (ref 12–17)
IMM GRANULOCYTES # BLD AUTO: 0.02 THOUSAND/UL (ref 0–0.2)
IMM GRANULOCYTES NFR BLD AUTO: 0 % (ref 0–2)
LYMPHOCYTES # BLD AUTO: 2.52 THOUSANDS/ΜL (ref 0.6–4.47)
LYMPHOCYTES NFR BLD AUTO: 30 % (ref 14–44)
MCH RBC QN AUTO: 29.5 PG (ref 26.8–34.3)
MCHC RBC AUTO-ENTMCNC: 33 G/DL (ref 31.4–37.4)
MCV RBC AUTO: 89 FL (ref 82–98)
MONOCYTES # BLD AUTO: 0.56 THOUSAND/ΜL (ref 0.17–1.22)
MONOCYTES NFR BLD AUTO: 7 % (ref 4–12)
NEUTROPHILS # BLD AUTO: 5.04 THOUSANDS/ΜL (ref 1.85–7.62)
NEUTS SEG NFR BLD AUTO: 61 % (ref 43–75)
NRBC BLD AUTO-RTO: 0 /100 WBCS
PLATELET # BLD AUTO: 317 THOUSANDS/UL (ref 149–390)
PMV BLD AUTO: 8.8 FL (ref 8.9–12.7)
POTASSIUM SERPL-SCNC: 4 MMOL/L (ref 3.5–5.3)
PROT SERPL-MCNC: 7.9 G/DL (ref 6.4–8.2)
RBC # BLD AUTO: 4.95 MILLION/UL (ref 3.88–5.62)
SODIUM SERPL-SCNC: 135 MMOL/L (ref 136–145)
TROPONIN I SERPL-MCNC: <0.02 NG/ML
TROPONIN I SERPL-MCNC: <0.02 NG/ML
WBC # BLD AUTO: 8.3 THOUSAND/UL (ref 4.31–10.16)

## 2020-11-29 PROCEDURE — 99284 EMERGENCY DEPT VISIT MOD MDM: CPT | Performed by: EMERGENCY MEDICINE

## 2020-11-29 PROCEDURE — 80053 COMPREHEN METABOLIC PANEL: CPT | Performed by: EMERGENCY MEDICINE

## 2020-11-29 PROCEDURE — 99285 EMERGENCY DEPT VISIT HI MDM: CPT

## 2020-11-29 PROCEDURE — 71046 X-RAY EXAM CHEST 2 VIEWS: CPT

## 2020-11-29 PROCEDURE — 84484 ASSAY OF TROPONIN QUANT: CPT | Performed by: EMERGENCY MEDICINE

## 2020-11-29 PROCEDURE — 85025 COMPLETE CBC W/AUTO DIFF WBC: CPT | Performed by: EMERGENCY MEDICINE

## 2020-11-29 PROCEDURE — 36415 COLL VENOUS BLD VENIPUNCTURE: CPT

## 2020-11-29 PROCEDURE — 93005 ELECTROCARDIOGRAM TRACING: CPT

## 2020-11-29 RX ORDER — LORAZEPAM 0.5 MG/1
0.5 TABLET ORAL 3 TIMES DAILY PRN
Qty: 10 TABLET | Refills: 0 | Status: SHIPPED | OUTPATIENT
Start: 2020-11-29 | End: 2021-08-18

## 2020-11-29 RX ORDER — LORAZEPAM 0.5 MG/1
0.5 TABLET ORAL ONCE
Status: COMPLETED | OUTPATIENT
Start: 2020-11-29 | End: 2020-11-29

## 2020-11-29 RX ORDER — HYDROXYZINE HYDROCHLORIDE 25 MG/1
25 TABLET, FILM COATED ORAL EVERY 6 HOURS PRN
Qty: 12 TABLET | Refills: 0 | Status: SHIPPED | OUTPATIENT
Start: 2020-11-29 | End: 2021-08-18

## 2020-11-29 RX ADMIN — LORAZEPAM 0.5 MG: 0.5 TABLET ORAL at 19:26

## 2020-11-30 LAB
ATRIAL RATE: 68 BPM
ATRIAL RATE: 71 BPM
ATRIAL RATE: 78 BPM
P AXIS: 59 DEGREES
P AXIS: 63 DEGREES
P AXIS: 67 DEGREES
PR INTERVAL: 172 MS
PR INTERVAL: 176 MS
PR INTERVAL: 180 MS
QRS AXIS: 72 DEGREES
QRS AXIS: 72 DEGREES
QRS AXIS: 84 DEGREES
QRSD INTERVAL: 100 MS
QRSD INTERVAL: 94 MS
QRSD INTERVAL: 94 MS
QT INTERVAL: 358 MS
QT INTERVAL: 390 MS
QT INTERVAL: 394 MS
QTC INTERVAL: 400 MS
QTC INTERVAL: 414 MS
QTC INTERVAL: 428 MS
T WAVE AXIS: 42 DEGREES
T WAVE AXIS: 44 DEGREES
T WAVE AXIS: 45 DEGREES
VENTRICULAR RATE: 68 BPM
VENTRICULAR RATE: 71 BPM
VENTRICULAR RATE: 75 BPM

## 2020-11-30 PROCEDURE — 93010 ELECTROCARDIOGRAM REPORT: CPT | Performed by: INTERNAL MEDICINE

## 2020-12-01 ENCOUNTER — TELEPHONE (OUTPATIENT)
Dept: FAMILY MEDICINE CLINIC | Facility: MEDICAL CENTER | Age: 25
End: 2020-12-01

## 2020-12-01 DIAGNOSIS — Z20.822 ENCOUNTER FOR SCREENING LABORATORY TESTING FOR COVID-19 VIRUS: Primary | ICD-10-CM

## 2020-12-02 ENCOUNTER — TELEPHONE (OUTPATIENT)
Dept: SURGERY | Facility: CLINIC | Age: 25
End: 2020-12-02

## 2020-12-03 ENCOUNTER — SEXUAL HEALTH (OUTPATIENT)
Dept: SURGERY | Facility: CLINIC | Age: 25
End: 2020-12-03

## 2020-12-03 DIAGNOSIS — A63.0 GENITAL WARTS: ICD-10-CM

## 2020-12-03 DIAGNOSIS — Z11.3 SCREENING FOR STD (SEXUALLY TRANSMITTED DISEASE): Primary | ICD-10-CM

## 2020-12-03 DIAGNOSIS — Z20.822 ENCOUNTER FOR SCREENING LABORATORY TESTING FOR COVID-19 VIRUS: ICD-10-CM

## 2020-12-03 PROCEDURE — U0003 INFECTIOUS AGENT DETECTION BY NUCLEIC ACID (DNA OR RNA); SEVERE ACUTE RESPIRATORY SYNDROME CORONAVIRUS 2 (SARS-COV-2) (CORONAVIRUS DISEASE [COVID-19]), AMPLIFIED PROBE TECHNIQUE, MAKING USE OF HIGH THROUGHPUT TECHNOLOGIES AS DESCRIBED BY CMS-2020-01-R: HCPCS | Performed by: FAMILY MEDICINE

## 2020-12-04 LAB — SARS-COV-2 RNA SPEC QL NAA+PROBE: NOT DETECTED

## 2020-12-09 ENCOUNTER — TELEPHONE (OUTPATIENT)
Dept: SURGERY | Facility: CLINIC | Age: 25
End: 2020-12-09

## 2020-12-10 ENCOUNTER — SEXUAL HEALTH (OUTPATIENT)
Dept: SURGERY | Facility: CLINIC | Age: 25
End: 2020-12-10

## 2020-12-10 DIAGNOSIS — Z71.2 ENCOUNTER TO DISCUSS TEST RESULTS: Primary | ICD-10-CM

## 2021-01-04 ENCOUNTER — TELEPHONE (OUTPATIENT)
Dept: FAMILY MEDICINE CLINIC | Facility: MEDICAL CENTER | Age: 26
End: 2021-01-04

## 2021-01-04 NOTE — TELEPHONE ENCOUNTER
Pt got a new phone and lost all his contacts and appts  He said he made an appt with the derm you recommended, but he doesn't have the info now  I didn't see an order in the system   Do you remember who you referred him to?

## 2021-01-04 NOTE — TELEPHONE ENCOUNTER
Phone doesn't ring-just dead line  He already has nurse visit scheduled for HPV #2  We need to try to call later

## 2021-01-12 DIAGNOSIS — F41.1 GAD (GENERALIZED ANXIETY DISORDER): ICD-10-CM

## 2021-01-16 RX ORDER — ESCITALOPRAM OXALATE 10 MG/1
10 TABLET ORAL DAILY
Qty: 90 TABLET | Refills: 0 | Status: SHIPPED | OUTPATIENT
Start: 2021-01-16 | End: 2021-04-26

## 2021-01-21 ENCOUNTER — CLINICAL SUPPORT (OUTPATIENT)
Dept: FAMILY MEDICINE CLINIC | Facility: MEDICAL CENTER | Age: 26
End: 2021-01-21
Payer: COMMERCIAL

## 2021-01-21 DIAGNOSIS — Z23 IMMUNIZATION DUE: Primary | ICD-10-CM

## 2021-01-21 PROCEDURE — 90471 IMMUNIZATION ADMIN: CPT

## 2021-01-21 PROCEDURE — 90651 9VHPV VACCINE 2/3 DOSE IM: CPT

## 2021-01-28 DIAGNOSIS — F51.01 PRIMARY INSOMNIA: ICD-10-CM

## 2021-01-29 ENCOUNTER — OFFICE VISIT (OUTPATIENT)
Dept: CARDIOLOGY CLINIC | Facility: CLINIC | Age: 26
End: 2021-01-29
Payer: COMMERCIAL

## 2021-01-29 ENCOUNTER — TELEPHONE (OUTPATIENT)
Dept: FAMILY MEDICINE CLINIC | Facility: MEDICAL CENTER | Age: 26
End: 2021-01-29

## 2021-01-29 ENCOUNTER — OFFICE VISIT (OUTPATIENT)
Dept: UROLOGY | Facility: MEDICAL CENTER | Age: 26
End: 2021-01-29
Payer: COMMERCIAL

## 2021-01-29 VITALS
BODY MASS INDEX: 27.43 KG/M2 | WEIGHT: 181 LBS | HEART RATE: 68 BPM | TEMPERATURE: 97.3 F | OXYGEN SATURATION: 97 % | HEIGHT: 68 IN | DIASTOLIC BLOOD PRESSURE: 60 MMHG | SYSTOLIC BLOOD PRESSURE: 126 MMHG

## 2021-01-29 VITALS
SYSTOLIC BLOOD PRESSURE: 132 MMHG | BODY MASS INDEX: 27.58 KG/M2 | HEIGHT: 68 IN | DIASTOLIC BLOOD PRESSURE: 78 MMHG | WEIGHT: 182 LBS

## 2021-01-29 DIAGNOSIS — Z00.00 HEALTHCARE MAINTENANCE: ICD-10-CM

## 2021-01-29 DIAGNOSIS — R00.2 PALPITATIONS: Primary | ICD-10-CM

## 2021-01-29 DIAGNOSIS — A63.0 CONDYLOMA ACUMINATUM IN MALE: ICD-10-CM

## 2021-01-29 DIAGNOSIS — I31.9 MYOPERICARDITIS: ICD-10-CM

## 2021-01-29 PROCEDURE — 99214 OFFICE O/P EST MOD 30 MIN: CPT | Performed by: INTERNAL MEDICINE

## 2021-01-29 PROCEDURE — 99244 OFF/OP CNSLTJ NEW/EST MOD 40: CPT | Performed by: UROLOGY

## 2021-01-29 PROCEDURE — 3008F BODY MASS INDEX DOCD: CPT | Performed by: UROLOGY

## 2021-01-29 PROCEDURE — 1036F TOBACCO NON-USER: CPT | Performed by: INTERNAL MEDICINE

## 2021-01-29 RX ORDER — PODOFILOX 5 MG/ML
SOLUTION TOPICAL 2 TIMES DAILY
Qty: 3.5 ML | Refills: 2 | Status: SHIPPED | OUTPATIENT
Start: 2021-01-29

## 2021-01-29 RX ORDER — TRAZODONE HYDROCHLORIDE 50 MG/1
50 TABLET ORAL
Qty: 30 TABLET | Refills: 2 | Status: SHIPPED | OUTPATIENT
Start: 2021-01-29 | End: 2021-06-16 | Stop reason: SDUPTHER

## 2021-01-29 NOTE — PROGRESS NOTES
HISTORY:     patient discovered genital warts eight months or so ago on penile shaft and pubic area just at 12:00 p m  near penis   he tried an over-the-counter freezing application which did not work  ASSESSMENT / PLAN:    Options discussed  I recommend Condylox, twice per day for least one month  Reassess in two months  I discussed laser as the procedure   Of choice, urology does not feel freezing is effective  The following portions of the patient's history were reviewed and updated as appropriate: allergies, current medications, past family history, past medical history, past social history, past surgical history and problem list     Review of Systems   All other systems reviewed and are negative  Objective:     Physical Exam  Constitutional:       General: He is not in acute distress  Appearance: He is well-developed  He is not diaphoretic  HENT:      Head: Normocephalic and atraumatic  Eyes:      General: No scleral icterus  Pulmonary:      Effort: Pulmonary effort is normal    Genitourinary:     Comments: penis testes several genital warts 1-4 mm in pubic region in proximal shaft  He points to other tiny white spots more distal shaft which he thinks are early warts, hard to tell if they are or not  Skin:     Coloration: Skin is not pale  Neurological:      Mental Status: He is alert and oriented to person, place, and time  Psychiatric:         Behavior: Behavior normal          Thought Content:  Thought content normal          Judgment: Judgment normal            No results found for: PSA]  BUN   Date Value Ref Range Status   11/29/2020 16 5 - 25 mg/dL Final     Creatinine   Date Value Ref Range Status   11/29/2020 1 21 0 60 - 1 30 mg/dL Final     Comment:     Standardized to IDMS reference method     No components found for: CBC      Patient Active Problem List   Diagnosis    Palpitations    Spina bifida occulta    TROY (generalized anxiety disorder)    Other chest pain    Hypokalemia    Myopericarditis    Genital warts    Encounter for immunization    Common wart        Diagnoses and all orders for this visit:    Vonnie Cooney in male  -     Ambulatory referral to Urology  -     podofilox (CONDYLOX) 0 5 % external solution; Apply topically 2 (two) times a day           Patient ID: Bradford Pop is a 26 y o  male  Current Outpatient Medications:     escitalopram (LEXAPRO) 10 mg tablet, Take 1 tablet (10 mg total) by mouth daily, Disp: 90 tablet, Rfl: 0    traZODone (DESYREL) 50 mg tablet, Take 1 tablet (50 mg total) by mouth daily at bedtime, Disp: 30 tablet, Rfl: 2    hydrOXYzine HCL (ATARAX) 25 mg tablet, Take 1 tablet (25 mg total) by mouth every 6 (six) hours as needed for anxiety for up to 20 doses (Patient not taking: Reported on 1/29/2021), Disp: 12 tablet, Rfl: 0    LORazepam (ATIVAN) 0 5 mg tablet, Take 1 tablet (0 5 mg total) by mouth 3 (three) times a day as needed for anxiety for up to 10 doses (Patient not taking: Reported on 1/29/2021), Disp: 10 tablet, Rfl: 0    podofilox (CONDYLOX) 0 5 % external solution, Apply topically 2 (two) times a day, Disp: 3 5 mL, Rfl: 2    Past Medical History:   Diagnosis Date    Anxiety        No past surgical history on file      Social History

## 2021-01-29 NOTE — PROGRESS NOTES
Cardiology Follow Up    Gurpreet Rivera  1995  [id number]  [facility] [place]  [address] [address] [facility]   [phone]    1  Palpitations     2  Myopericarditis         Diagnoses and all orders for this visit:    Palpitations    Myopericarditis      I had the pleasure of seeing Gurpreet Rivera for a follow up visit  INTERVAL HISTORY:  Feels better    History of the presenting illness, Discussion/Summary and My Plan are as follows:::    43-year-old with no prior cardiac history, admitted with viral gastroenteritis symptoms of couple of days duration as well as chest pains, with a peak troponin of 16, with ECG showing diffuse ST elevations with an elevated CRP at 90, overall clinical picture suggested acute pericarditis, with echo as well as cardiac MRI showing inferior and inferolateral wall involvement  Echo in the hospital also showed mild spontaneous echo contrast even though LV function was normal     He completed Motrin and subsequently colchicine also with no further recurrence of symptoms  Also had an echocardiogram that showed resolution of the spontaneous echo contrast     Has had palpitations-he describes them exactly as ectopic beats, without any other associated symptoms  Has had 1 presentation to the ER with negative evaluation  Plan:    History of Acute myopericarditis February 2020 with preserved LV function but with spontaneous echo contrast:    Completed Motrin and colchicine, repeat echo showed resolution of spontaneous echo contrast  No further recurrences  Palpitations:  Likely ectopic beats-PVCs/PACs, standing order for a 48 hour Holter was given, advised him to limit any pre workout supplements  Currently drinking only about a cup of coffee a day  Will check a TSH, BMP and lipids     Follow-up in 6 months      Patient Active Problem List   Diagnosis    Palpitations    Spina bifida occulta    TROY (generalized anxiety disorder)    Other chest pain    Hypokalemia    Myopericarditis    Genital warts    Encounter for immunization    Common wart     Past Medical History:   Diagnosis Date    Anxiety      Social History     Socioeconomic History    Marital status: Single     Spouse name: Not on file    Number of children: Not on file    Years of education: Not on file    Highest education level: Not on file   Occupational History    Not on file   Social Needs    Financial resource strain: Not on file    Food insecurity     Worry: Not on file     Inability: Not on file    Transportation needs     Medical: Not on file     Non-medical: Not on file   Tobacco Use    Smoking status: Former Smoker    Smokeless tobacco: Former User     Types: Chew   Substance and Sexual Activity    Alcohol use: Yes     Comment: Socially    Drug use: No    Sexual activity: Not on file   Lifestyle    Physical activity     Days per week: Not on file     Minutes per session: Not on file    Stress: Not on file   Relationships    Social connections     Talks on phone: Not on file     Gets together: Not on file     Attends [employer] service: Not on file     Active member of club or organization: Not on file     Attends meetings of clubs or organizations: Not on file     Relationship status: Not on file    Intimate partner violence     Fear of current or ex partner: Not on file     Emotionally abused: Not on file     Physically abused: Not on file     Forced sexual activity: Not on file   Other Topics Concern    Not on file   Social History Narrative    Caffeine use      Family History   Problem Relation Age of Onset    No Known Problems Mother     No Known Problems Father     ADD / ADHD Sister      History reviewed  No pertinent surgical history      Current Outpatient Medications:     escitalopram (LEXAPRO) 10 mg tablet, Take 1 tablet (10 mg total) by mouth daily, Disp: 90 tablet, Rfl: 0    traZODone (DESYREL) 50 mg tablet, Take 1 tablet (50 mg total) by mouth daily at bedtime, Disp: 30 tablet, Rfl: 2    hydrOXYzine HCL (ATARAX) 25 mg tablet, Take 1 tablet (25 mg total) by mouth every 6 (six) hours as needed for anxiety for up to 20 doses (Patient not taking: Reported on 1/29/2021), Disp: 12 tablet, Rfl: 0    LORazepam (ATIVAN) 0 5 mg tablet, Take 1 tablet (0 5 mg total) by mouth 3 (three) times a day as needed for anxiety for up to 10 doses (Patient not taking: Reported on 1/29/2021), Disp: 10 tablet, Rfl: 0    podofilox (CONDYLOX) 0 5 % external solution, Apply topically 2 (two) times a day (Patient not taking: Reported on 1/29/2021), Disp: 3 5 mL, Rfl: 2  No Known Allergies    Imaging: Cta Head And Neck With And Without Contrast    Result Date: 2/11/2020  Narrative: CTA NECK AND BRAIN WITH AND WITHOUT CONTRAST INDICATION: Neck pain COMPARISON:   7/29/2019  TECHNIQUE:  Routine CT imaging of the Brain without contrast   Post contrast imaging was performed after administration of iodinated contrast through the neck and brain  Post contrast axial 0 625 mm images timed to opacify the arterial system  3D rendering was performed on an independent workstation  MIP reconstructions performed  Coronal reconstructions were performed of the noncontrast portion of the brain  Radiation dose length product (DLP) for this visit:  763 mGy-cm   This examination, like all CT scans performed in the [facility], was performed utilizing techniques to minimize radiation dose exposure, including the use of iterative reconstruction and automated exposure control  IV Contrast:  85 mL of iohexol (OMNIPAQUE)  IMAGE QUALITY:   Diagnostic FINDINGS: NONCONTRAST BRAIN PARENCHYMA:  No evidence of acute vascular territorial infarction, intracranial hemorrhage or mass effect  VENTRICLES AND EXTRA-AXIAL SPACES:  No hydrocephalus or extra-axial collection   VISUALIZED ORBITS AND PARANASAL SINUSES:  Intact globes and orbits  Mild mucosal thickening in the right maxillary sinus, chronic  The CERVICAL VASCULATURE AORTIC ARCH AND GREAT VESSELS:  Three-vessel comparison the aortic arch  No stenosis in the subclavian arteries  RIGHT VERTEBRAL ARTERY CERVICAL SEGMENT:  Normal origin  The vessel is normal in caliber throughout the neck  LEFT VERTEBRAL ARTERY CERVICAL SEGMENT:  Normal origin  The vessel is normal in caliber throughout the neck  RIGHT EXTRACRANIAL CAROTID SEGMENT:  Normal caliber common carotid artery  Normal bifurcation and cervical internal carotid artery  No stenosis or dissection  LEFT EXTRACRANIAL CAROTID SEGMENT:  Normal caliber common carotid artery  Normal bifurcation and cervical internal carotid artery  No stenosis or dissection  NASCET criteria was used to determine the degree of internal carotid artery diameter stenosis  INTRACRANIAL VASCULATURE INTERNAL CAROTID ARTERIES:  Normal enhancement of the intracranial portions of the internal carotid arteries  Normal ophthalmic artery origins  Normal ICA terminus  ANTERIOR CIRCULATION:  Symmetric A1 segments and anterior cerebral arteries with normal enhancement  Normal anterior communicating artery  MIDDLE CEREBRAL ARTERY CIRCULATION:  M1 segment and middle cerebral artery branches demonstrate normal enhancement bilaterally  DISTAL VERTEBRAL ARTERIES:  Normal distal vertebral arteries  Posterior inferior cerebellar artery origins are normal  Normal vertebral basilar junction  BASILAR ARTERY:  Basilar artery is normal in caliber  Normal superior cerebellar arteries  POSTERIOR CEREBRAL ARTERIES: Fetal type right and nearly fetal type left posterior cerebral arteries without stenosis  DURAL VENOUS SINUSES:  Patent  NON VASCULAR ANATOMY BONY STRUCTURES:  No lytic or blastic lesion  SOFT TISSUES OF THE NECK:  No mass or lymphadenopathy  THORACIC INLET:  Clear lung apices  Impression: 1    No hemodynamically significant stenosis, dissection or occlusion of the carotid or vertebral arteries or major vessels of the [place]  2   No evidence of intracranial hemorrhage, infarct or mass effect  Workstation performed: [id number]     Xr Chest 2 Views    Result Date: 2/11/2020  Narrative: CHEST INDICATION:   Chest Pain  COMPARISON:  11/27/2010 EXAM PERFORMED/VIEWS:  XR CHEST PA & LATERAL FINDINGS: Cardiomediastinal silhouette appears unremarkable  The lungs are clear  No pneumothorax or pleural effusion  Osseous structures appear within normal limits for patient age  Impression: No acute cardiopulmonary disease  Workstation performed: [id number]     Mri Cardiac  W Wo Contrast    Result Date: 2/16/2020  Narrative: 22year old man for evaluation for myocarditis  Technique: 1  3 plane SSFP localizers  2  SSFP cine imaging in long, short, and axial planes  3  T1 weighted DIR FSE without fat saturation and T1 weighted TIR FSE in axial plane  4  18 ml gadobutrol power injected  5  2D inversion recovery FGRE for delayed myocardial enhancement  6  The patient tolerated the procedure well without complication  Measurements: Efren-septal wall 11 mm Postero-lateral wall 9 mm Left ventricular end-diastolic dimension 55 mm Left ventricular end-systolic dimension 45 mm Left ventricular end-diastolic volume 206 ml Left ventricular end-systolic volume  692 ml Stroke volume 111 ml Cardiac Output 6 8 L/min Ejection fraction 52 % Left atrium 36 mm Aortic Root 29 mm Findings:  1  The left ventricle is top normal in size with top normal wall thickness  Left ventricular systolic function is low-normal with a measured ejection fraction of 52%  There is mild hypokinesis of the mid inferior wall  2  The right ventricle is normal in size with normal right ventricular systolic function  3  The aortic, mitral, and tricuspid valves open without restriction    There is no significant valvular regurgitation, however cine MRI is inaccurate in the qualitative assessment of valvular regurgitation  4  The left atrium is normal in size  The aortic root is normal in size  5  There is no evidence of fibrofatty infiltration into the right ventricular myocardium  6  Delayed post-gadolinium imaging demonstrates patchy subepicardial hyperenhancement of the basal to mid inferior and inferolateral walls  Impression: Impression: 1  Top normal left ventricular size with low-normal left ventricular systolic function  2  Normal right ventricular size and systolic function  3  No significant valvular abnormalities  4  Patchy subepicardial fibrosis of the basal to mid inferior and inferolateral walls  This is highly suggestive of myopericarditis  Workstation performed: [id number]       Review of Systems:  Review of Systems   Constitutional: Negative  HENT: Negative  Respiratory: Negative  Cardiovascular: Negative  Musculoskeletal: Negative  Neurological: Negative  Physical Exam:  /60 (BP Location: Left arm, Patient Position: Sitting, Cuff Size: Large)   Pulse 68   Temp (!) 97 3 °F (36 3 °C) (Temporal)   Ht 5' 8" (1 727 m)   Wt 82 1 kg (181 lb)   SpO2 97%   BMI 27 52 kg/m²   Physical Exam  Constitutional:       General: He is not in acute distress  Appearance: He is well-developed  He is not diaphoretic  HENT:      Head: Normocephalic and atraumatic  Eyes:      General: No scleral icterus  Right eye: No discharge  Left eye: No discharge  Conjunctiva/sclera: Conjunctivae normal       Pupils: Pupils are equal, round, and reactive to light  Neck:      Musculoskeletal: Normal range of motion  Thyroid: No thyromegaly  Trachea: No tracheal deviation  Cardiovascular:      Rate and Rhythm: Normal rate and regular rhythm  Heart sounds: No murmur  No friction rub  Pulmonary:      Effort: Pulmonary effort is normal  No respiratory distress  Breath sounds: No stridor     Abdominal:      General: Bowel sounds are normal  There is no distension  Palpations: Abdomen is soft  Tenderness: There is no abdominal tenderness  Musculoskeletal: Normal range of motion  General: No deformity  Skin:     General: Skin is warm  Coloration: Skin is not pale  Findings: No erythema or rash

## 2021-02-06 LAB
BUN SERPL-MCNC: 14 MG/DL (ref 7–25)
BUN/CREAT SERPL: NORMAL (CALC) (ref 6–22)
CALCIUM SERPL-MCNC: 9.9 MG/DL (ref 8.6–10.3)
CHLORIDE SERPL-SCNC: 101 MMOL/L (ref 98–110)
CHOLEST SERPL-MCNC: 281 MG/DL
CHOLEST/HDLC SERPL: 3.8 (CALC)
CO2 SERPL-SCNC: 28 MMOL/L (ref 20–32)
CREAT SERPL-MCNC: 1.09 MG/DL (ref 0.6–1.35)
GLUCOSE SERPL-MCNC: 93 MG/DL (ref 65–99)
HDLC SERPL-MCNC: 73 MG/DL
LDLC SERPL CALC-MCNC: 178 MG/DL (CALC)
NONHDLC SERPL-MCNC: 208 MG/DL (CALC)
POTASSIUM SERPL-SCNC: 4.2 MMOL/L (ref 3.5–5.3)
SL AMB EGFR AFRICAN AMERICAN: 110 ML/MIN/1.73M2
SL AMB EGFR NON AFRICAN AMERICAN: 95 ML/MIN/1.73M2
SODIUM SERPL-SCNC: 139 MMOL/L (ref 135–146)
TRIGL SERPL-MCNC: 157 MG/DL
TSH SERPL-ACNC: 2.79 MIU/L (ref 0.4–4.5)

## 2021-02-10 ENCOUNTER — TELEPHONE (OUTPATIENT)
Dept: CARDIOLOGY CLINIC | Facility: CLINIC | Age: 26
End: 2021-02-10

## 2021-02-10 DIAGNOSIS — E78.5 HYPERLIPIDEMIA, UNSPECIFIED HYPERLIPIDEMIA TYPE: Primary | ICD-10-CM

## 2021-02-10 NOTE — TELEPHONE ENCOUNTER
----- Message from Paulo He MD sent at 2/10/2021  9:24 AM EST -----  Cholesterol levels are very high, needs to make changes in his diet,  Recheck in 3 months     please send this to him:      1  Decreasing saturated fat intake to less than 13 g per day  Watch intake of cheese, red meat, cream and butter containing foods  2  Increase soluble fiber in the diet-beans, pears, oatmeal  3  Google 'TLC Cholesterol" = Your guide to lowering your cholesterol with TLC is probably the best online resource to lower your LDL cholesterol  4   Please call if you would be willing to see a dietitian

## 2021-02-10 NOTE — TELEPHONE ENCOUNTER
Patient returned call and I explained to him per Dr Amie Clolier that his lipid panel was high and he needs to change his diet and repeat lab work in 3 months

## 2021-04-22 DIAGNOSIS — F41.1 GAD (GENERALIZED ANXIETY DISORDER): ICD-10-CM

## 2021-04-26 RX ORDER — ESCITALOPRAM OXALATE 10 MG/1
TABLET ORAL
Qty: 90 TABLET | Refills: 0 | Status: SHIPPED | OUTPATIENT
Start: 2021-04-26 | End: 2021-08-03 | Stop reason: SDUPTHER

## 2021-06-16 DIAGNOSIS — F51.01 PRIMARY INSOMNIA: ICD-10-CM

## 2021-06-17 RX ORDER — TRAZODONE HYDROCHLORIDE 50 MG/1
50 TABLET ORAL
Qty: 30 TABLET | Refills: 2 | Status: SHIPPED | OUTPATIENT
Start: 2021-06-17 | End: 2022-05-04 | Stop reason: SDUPTHER

## 2021-07-26 ENCOUNTER — CLINICAL SUPPORT (OUTPATIENT)
Dept: FAMILY MEDICINE CLINIC | Facility: MEDICAL CENTER | Age: 26
End: 2021-07-26
Payer: COMMERCIAL

## 2021-07-26 DIAGNOSIS — Z23 IMMUNIZATION DUE: Primary | ICD-10-CM

## 2021-07-26 PROCEDURE — 90651 9VHPV VACCINE 2/3 DOSE IM: CPT

## 2021-07-26 PROCEDURE — 90471 IMMUNIZATION ADMIN: CPT

## 2021-08-03 ENCOUNTER — TELEPHONE (OUTPATIENT)
Dept: FAMILY MEDICINE CLINIC | Facility: MEDICAL CENTER | Age: 26
End: 2021-08-03

## 2021-08-03 NOTE — TELEPHONE ENCOUNTER
Pt has an appt with you on 8/18  He only has 4 pills of prozac left  It was originally prescribed by psych but he doesn't see them anymore  And they would not give him a refill  He wants to know if you could send enough to make it to his appt  He takes one a day of 10mg  He uses [facility]  Please, advise if you would do this  Thank you

## 2021-08-18 ENCOUNTER — OFFICE VISIT (OUTPATIENT)
Dept: FAMILY MEDICINE CLINIC | Facility: MEDICAL CENTER | Age: 26
End: 2021-08-18
Payer: COMMERCIAL

## 2021-08-18 VITALS
HEIGHT: 68 IN | HEART RATE: 92 BPM | BODY MASS INDEX: 25.91 KG/M2 | WEIGHT: 171 LBS | TEMPERATURE: 98.5 F | DIASTOLIC BLOOD PRESSURE: 80 MMHG | SYSTOLIC BLOOD PRESSURE: 130 MMHG

## 2021-08-18 DIAGNOSIS — E78.2 MIXED HYPERLIPIDEMIA: ICD-10-CM

## 2021-08-18 DIAGNOSIS — F41.1 GAD (GENERALIZED ANXIETY DISORDER): ICD-10-CM

## 2021-08-18 DIAGNOSIS — R00.2 PALPITATIONS: ICD-10-CM

## 2021-08-18 DIAGNOSIS — Z00.00 PREVENTATIVE HEALTH CARE: Primary | ICD-10-CM

## 2021-08-18 PROBLEM — E87.6 HYPOKALEMIA: Status: RESOLVED | Noted: 2020-02-11 | Resolved: 2021-08-18

## 2021-08-18 PROBLEM — B07.8 COMMON WART: Status: RESOLVED | Noted: 2020-11-19 | Resolved: 2021-08-18

## 2021-08-18 PROBLEM — R07.89 OTHER CHEST PAIN: Status: RESOLVED | Noted: 2020-02-11 | Resolved: 2021-08-18

## 2021-08-18 PROBLEM — I31.9 MYOPERICARDITIS: Status: RESOLVED | Noted: 2020-03-09 | Resolved: 2021-08-18

## 2021-08-18 PROBLEM — A63.0 GENITAL WARTS: Status: RESOLVED | Noted: 2020-11-19 | Resolved: 2021-08-18

## 2021-08-18 PROBLEM — Z23 ENCOUNTER FOR IMMUNIZATION: Status: RESOLVED | Noted: 2020-11-19 | Resolved: 2021-08-18

## 2021-08-18 PROCEDURE — 99395 PREV VISIT EST AGE 18-39: CPT | Performed by: FAMILY MEDICINE

## 2021-08-18 PROCEDURE — 1036F TOBACCO NON-USER: CPT | Performed by: FAMILY MEDICINE

## 2021-08-18 PROCEDURE — 3725F SCREEN DEPRESSION PERFORMED: CPT | Performed by: FAMILY MEDICINE

## 2021-08-18 PROCEDURE — 3008F BODY MASS INDEX DOCD: CPT | Performed by: FAMILY MEDICINE

## 2021-08-18 RX ORDER — FLUOXETINE HYDROCHLORIDE 20 MG/1
20 CAPSULE ORAL DAILY
COMMUNITY
Start: 2021-05-13 | End: 2021-08-18 | Stop reason: SDUPTHER

## 2021-08-18 RX ORDER — FLUOXETINE HYDROCHLORIDE 20 MG/1
20 CAPSULE ORAL DAILY
Qty: 90 CAPSULE | Refills: 1 | Status: SHIPPED | OUTPATIENT
Start: 2021-08-18 | End: 2021-08-30 | Stop reason: SDUPTHER

## 2021-08-18 RX ORDER — FLUOXETINE HYDROCHLORIDE 20 MG/1
20 CAPSULE ORAL DAILY
Qty: 90 CAPSULE | Refills: 1 | Status: SHIPPED | OUTPATIENT
Start: 2021-08-18 | End: 2021-08-18

## 2021-08-19 NOTE — PROGRESS NOTES
Assessment/Plan:    TROY (generalized anxiety disorder)  Patient saw psychiatrist   He changed it from [address] to Prozac  He has been much more happier with that one  He does not have as much erectile dysfunction  He rarely uses the trazodone but he does have occasional night that he can sleep  Will continue Prozac and p r n  trazodone  Mixed hyperlipidemia  A lipid profile was done in February  And his LDL was 178 and his total cholesterol is 289  His mother is being treated for high cholesterol  He is going to get another one done now, and if it is still high I will recommend a statin  Palpitations  He is doing much better with this  He attributes that to changing to Prozac  Problem List Items Addressed This Visit        Other    Palpitations     He is doing much better with this  He attributes that to changing to Prozac  TROY (generalized anxiety disorder)     Patient saw psychiatrist   He changed it from [address] to Prozac  He has been much more happier with that one  He does not have as much erectile dysfunction  He rarely uses the trazodone but he does have occasional night that he can sleep  Will continue Prozac and p r n  trazodone  Relevant Medications    FLUoxetine (PROzac) 20 mg capsule    Mixed hyperlipidemia     A lipid profile was done in February  And his LDL was 178 and his total cholesterol is 289  His mother is being treated for high cholesterol  He is going to get another one done now, and if it is still high I will recommend a statin  Other Visit Diagnoses     Preventative health care    -  Primary            Subjective:      Patient ID: Nara Ames is a 26 y o  male  He has a 66-year-old man  He is goal driven  He owns his own house  He works in [employer]  He is not needing any cancer screening        The following portions of the patient's history were reviewed and updated as appropriate: allergies, current medications, past family history, past medical history, past social history, past surgical history and problem list     Review of Systems   Constitutional: Negative for activity change, fatigue and fever  HENT: Negative for congestion, ear discharge, ear pain, postnasal drip, rhinorrhea, sinus pain, sneezing and sore throat  Eyes: Negative for photophobia, pain, discharge and redness  Respiratory: Negative for apnea, cough, shortness of breath and wheezing  Cardiovascular: Negative for chest pain and palpitations  Gastrointestinal: Negative for abdominal pain, blood in stool, constipation, diarrhea, nausea and vomiting  Endocrine: Negative for polydipsia, polyphagia and polyuria  Genitourinary: Negative for decreased urine volume, difficulty urinating, discharge, dysuria, frequency, penile pain and urgency  Musculoskeletal: Negative for arthralgias, gait problem, joint swelling and neck pain  Skin: Negative for color change and rash  Neurological: Negative for dizziness, tremors, seizures, weakness and headaches  Psychiatric/Behavioral: Negative for agitation and sleep disturbance  The patient is not nervous/anxious  Objective:      /80 (BP Location: Left arm, Patient Position: Sitting, Cuff Size: Adult)   Pulse 92   Temp 98 5 °F (36 9 °C)   Ht 5' 8" (1 727 m)   Wt 77 6 kg (171 lb)   BMI 26 00 kg/m²          Physical Exam  Vitals and nursing note reviewed  Constitutional:       Appearance: Normal appearance  He is well-developed  HENT:      Head: Normocephalic  Right Ear: Tympanic membrane and external ear normal       Left Ear: Tympanic membrane and external ear normal       Nose: Nose normal       Mouth/Throat:      Mouth: Mucous membranes are moist    Eyes:      General: Lids are normal       Conjunctiva/sclera: Conjunctivae normal       Pupils: Pupils are equal, round, and reactive to light  Neck:      Thyroid: No thyromegaly        Vascular: No carotid bruit  Cardiovascular:      Rate and Rhythm: Normal rate and regular rhythm  Pulses: Normal pulses  Heart sounds: Normal heart sounds, S1 normal and S2 normal  No murmur heard  Pulmonary:      Effort: Pulmonary effort is normal  No respiratory distress  Breath sounds: Normal breath sounds  No wheezing or rales  Abdominal:      General: Bowel sounds are normal       Palpations: Abdomen is soft  There is no mass  Tenderness: There is no abdominal tenderness  Genitourinary:     Penis: Normal        Testes: Normal    Musculoskeletal:         General: Normal range of motion  Cervical back: Normal range of motion and neck supple  Lymphadenopathy:      Cervical: No cervical adenopathy  Skin:     General: Skin is warm and dry  Coloration: Skin is not pale  Findings: No rash  Neurological:      General: No focal deficit present  Mental Status: He is alert and oriented to person, place, and time  Cranial Nerves: No cranial nerve deficit  Sensory: No sensory deficit  Deep Tendon Reflexes: Reflexes are normal and symmetric  Psychiatric:         Behavior: Behavior normal  Behavior is cooperative  Thought Content:  Thought content normal          Judgment: Judgment normal

## 2021-08-19 NOTE — ASSESSMENT & PLAN NOTE
Patient saw psychiatrist   He changed it from [address] to Prozac  He has been much more happier with that one  He does not have as much erectile dysfunction  He rarely uses the trazodone but he does have occasional night that he can sleep  Will continue Prozac and p r n  trazodone

## 2021-08-19 NOTE — ASSESSMENT & PLAN NOTE
A lipid profile was done in February  And his LDL was 178 and his total cholesterol is 289  His mother is being treated for high cholesterol  He is going to get another one done now, and if it is still high I will recommend a statin

## 2021-08-30 ENCOUNTER — TELEPHONE (OUTPATIENT)
Dept: FAMILY MEDICINE CLINIC | Facility: MEDICAL CENTER | Age: 26
End: 2021-08-30

## 2021-08-30 DIAGNOSIS — F41.1 GAD (GENERALIZED ANXIETY DISORDER): ICD-10-CM

## 2021-08-30 RX ORDER — FLUOXETINE 10 MG/1
10 CAPSULE ORAL DAILY
Qty: 90 CAPSULE | Refills: 1 | Status: SHIPPED | OUTPATIENT
Start: 2021-08-30

## 2021-08-30 NOTE — TELEPHONE ENCOUNTER
Pt said that his prozac was sent to the pharmacy for 20mg but he only takes 10mg  Please, correct  Thank you

## 2021-10-05 ENCOUNTER — TELEPHONE (OUTPATIENT)
Dept: FAMILY MEDICINE CLINIC | Facility: MEDICAL CENTER | Age: 26
End: 2021-10-05

## 2021-10-08 LAB
CHOLEST SERPL-MCNC: 275 MG/DL
CHOLEST/HDLC SERPL: 3.8 (CALC)
HDLC SERPL-MCNC: 72 MG/DL
LDLC SERPL CALC-MCNC: 182 MG/DL (CALC)
NONHDLC SERPL-MCNC: 203 MG/DL (CALC)
TRIGL SERPL-MCNC: 91 MG/DL

## 2021-10-14 ENCOUNTER — DOCUMENTATION (OUTPATIENT)
Dept: CARDIOLOGY CLINIC | Facility: CLINIC | Age: 26
End: 2021-10-14

## 2021-10-18 ENCOUNTER — OFFICE VISIT (OUTPATIENT)
Dept: FAMILY MEDICINE CLINIC | Facility: MEDICAL CENTER | Age: 26
End: 2021-10-18
Payer: COMMERCIAL

## 2021-10-18 VITALS
HEART RATE: 90 BPM | DIASTOLIC BLOOD PRESSURE: 78 MMHG | TEMPERATURE: 97.6 F | WEIGHT: 173 LBS | OXYGEN SATURATION: 99 % | BODY MASS INDEX: 26.22 KG/M2 | HEIGHT: 68 IN | SYSTOLIC BLOOD PRESSURE: 138 MMHG

## 2021-10-18 DIAGNOSIS — E78.2 MIXED HYPERLIPIDEMIA: Primary | ICD-10-CM

## 2021-10-18 PROCEDURE — 99213 OFFICE O/P EST LOW 20 MIN: CPT | Performed by: FAMILY MEDICINE

## 2021-10-18 PROCEDURE — 3725F SCREEN DEPRESSION PERFORMED: CPT | Performed by: FAMILY MEDICINE

## 2021-10-18 PROCEDURE — 1036F TOBACCO NON-USER: CPT | Performed by: FAMILY MEDICINE

## 2021-10-18 PROCEDURE — 3008F BODY MASS INDEX DOCD: CPT | Performed by: FAMILY MEDICINE

## 2021-10-18 RX ORDER — ROSUVASTATIN CALCIUM 10 MG/1
10 TABLET, COATED ORAL DAILY
Qty: 90 TABLET | Refills: 3 | Status: SHIPPED | OUTPATIENT
Start: 2021-10-18

## 2022-03-28 ENCOUNTER — OFFICE VISIT (OUTPATIENT)
Dept: CARDIOLOGY CLINIC | Facility: CLINIC | Age: 27
End: 2022-03-28
Payer: COMMERCIAL

## 2022-03-28 VITALS
BODY MASS INDEX: 26.22 KG/M2 | OXYGEN SATURATION: 99 % | SYSTOLIC BLOOD PRESSURE: 158 MMHG | HEART RATE: 79 BPM | WEIGHT: 173 LBS | HEIGHT: 68 IN | DIASTOLIC BLOOD PRESSURE: 82 MMHG

## 2022-03-28 DIAGNOSIS — I31.9 MYOPERICARDITIS: ICD-10-CM

## 2022-03-28 DIAGNOSIS — E78.5 HYPERLIPIDEMIA, UNSPECIFIED HYPERLIPIDEMIA TYPE: Primary | ICD-10-CM

## 2022-03-28 DIAGNOSIS — R00.2 PALPITATIONS: ICD-10-CM

## 2022-03-28 PROCEDURE — 1036F TOBACCO NON-USER: CPT | Performed by: INTERNAL MEDICINE

## 2022-03-28 PROCEDURE — 99214 OFFICE O/P EST MOD 30 MIN: CPT | Performed by: INTERNAL MEDICINE

## 2022-03-28 PROCEDURE — 3008F BODY MASS INDEX DOCD: CPT | Performed by: INTERNAL MEDICINE

## 2022-03-28 PROCEDURE — 93000 ELECTROCARDIOGRAM COMPLETE: CPT | Performed by: INTERNAL MEDICINE

## 2022-03-28 NOTE — PROGRESS NOTES
Cardiology Follow Up    Viktor Moody  1995  [id number]  [facility]  [address] [address] [facility]   [phone]    1  Hyperlipidemia, unspecified hyperlipidemia type         Diagnoses and all orders for this visit:    Hyperlipidemia, unspecified hyperlipidemia type      I had the pleasure of seeing Viktor Moody for a follow up visit  INTERVAL HISTORY:  Feels better    History of the presenting illness, Discussion/Summary and My Plan are as follows:::    46-year-old with a history of myopericarditis, hyperlipidemia and palpitations,    He was admitted with viral gastroenteritis symptoms of couple of days duration as well as chest pains, with a peak troponin of 16, with ECG showing diffuse ST elevations with an elevated CRP at 90, overall clinical picture suggested acute pericarditis, with echo as well as cardiac MRI showing inferior and inferolateral wall involvement  Echo in the hospital also showed mild spontaneous echo contrast even though LV function was normal     He completed Motrin and subsequently colchicine also with no further recurrence of symptoms  Also had an echocardiogram that showed resolution of the spontaneous echo contrast     Has had palpitations-he describes them exactly as ectopic beats, without any other associated symptoms  Has had 1 presentation to the ER with negative evaluation  He has been physically very active, works out 3 days a week, as well as hikes once a week to stay if it, without any cardiac symptoms with physical activity  Looks well built  Plan:    History of Acute myopericarditis February 2020 with preserved LV function but with spontaneous echo contrast:    Completed Motrin and colchicine, repeat echo May 2020 showed resolution of spontaneous echo contrast  No further recurrences      Palpitations:  Likely ectopic beats-PVCs/PACs, will complete- 48 hour Holter   He has avoided any pre workout supplements  Currently drinking only about a cup to 2 of coffee a day  Alcohol-about three days a week, 2-5 drinks, palpitations are less when he drinks,  Will check a TSH, BMP and lipids     Dyslipidemia:  Surprisingly despite being a 67-year-old male, has made a lot of significant changes, I told him I am impressed, will recheck his lipids with his next labs  At the time of his last blood work in October, not sure if he was following a diet  Currently on a statin, will recheck lipids  Will check an exercise treadmill stress test considering lifelong elevation in lipid profile and palpitations    Follow-up in 6 months  Results for Yevgeniy Castellanos (MRN [id number]) as of 3/28/2022 15:54   Ref  Range 2/5/2021 07:29 10/7/2021 11:37   Cholesterol Latest Ref Range: <200 mg/dL 281 (H) 275 (H)   Triglycerides Latest Ref Range: <150 mg/dL 157 (H) 91   HDL Latest Ref Range: > OR = 40 mg/dL 73 72   Non-HDL Cholesterol Latest Ref Range: <130 mg/dL (calc) 208 (H) 203 (H)   LDL Calculated Latest Units: mg/dL (calc) 178 (H) 182 (H)   Chol HDLC Ratio Latest Ref Range: <5 0 (calc) 3 8 3 8       Patient Active Problem List   Diagnosis    Palpitations    TROY (generalized anxiety disorder)    Mixed hyperlipidemia     Past Medical History:   Diagnosis Date    Anxiety     Myopericarditis 3/9/2020    Other chest pain 2/11/2020    Spina bifida occulta 11/3/2015     Social History     Socioeconomic History    Marital status: Single     Spouse name: Not on file    Number of children: Not on file    Years of education: Not on file    Highest education level: Not on file   Occupational History    Not on file   Tobacco Use    Smoking status: Former Smoker    Smokeless tobacco: Former User     Types: Chew   Vaping Use    Vaping Use: Former   Substance and Sexual Activity    Alcohol use: Yes     Comment: Socially 3-4 days a week     Drug use:  Yes Types: Marijuana     Comment: 4 times a week at night    Sexual activity: Not on file   Other Topics Concern    Not on file   Social History Narrative    Caffeine use     Social Determinants of Health     Financial Resource Strain: Not on file   Food Insecurity: Not on file   Transportation Needs: Not on file   Physical Activity: Not on file   Stress: Not on file   Social Connections: Not on file   Intimate Partner Violence: Not on file   Housing Stability: Not on file      Family History   Problem Relation Age of Onset    Hyperlipidemia Mother     No Known Problems Father     ADD / ADHD Sister      History reviewed  No pertinent surgical history  Current Outpatient Medications:     rosuvastatin (CRESTOR) 10 MG tablet, Take 1 tablet (10 mg total) by mouth daily, Disp: 90 tablet, Rfl: 3    traZODone (DESYREL) 50 mg tablet, Take 1 tablet (50 mg total) by mouth daily at bedtime, Disp: 30 tablet, Rfl: 2    FLUoxetine (PROzac) 10 mg capsule, Take 1 capsule (10 mg total) by mouth daily (Patient not taking: Reported on 3/28/2022 ), Disp: 90 capsule, Rfl: 1    podofilox (CONDYLOX) 0 5 % external solution, Apply topically 2 (two) times a day (Patient not taking: Reported on 3/28/2022 ), Disp: 3 5 mL, Rfl: 2  No Known Allergies    Imaging: Cta Head And Neck With And Without Contrast    Result Date: 2/11/2020  Narrative: CTA NECK AND BRAIN WITH AND WITHOUT CONTRAST INDICATION: Neck pain COMPARISON:   7/29/2019  TECHNIQUE:  Routine CT imaging of the Brain without contrast   Post contrast imaging was performed after administration of iodinated contrast through the neck and brain  Post contrast axial 0 625 mm images timed to opacify the arterial system  3D rendering was performed on an independent workstation  MIP reconstructions performed  Coronal reconstructions were performed of the noncontrast portion of the brain  Radiation dose length product (DLP) for this visit:  763 mGy-cm     This examination, like all CT scans performed in the [place], was performed utilizing techniques to minimize radiation dose exposure, including the use of iterative reconstruction and automated exposure control  IV Contrast:  85 mL of iohexol (OMNIPAQUE)  IMAGE QUALITY:   Diagnostic FINDINGS: NONCONTRAST BRAIN PARENCHYMA:  No evidence of acute vascular territorial infarction, intracranial hemorrhage or mass effect  VENTRICLES AND EXTRA-AXIAL SPACES:  No hydrocephalus or extra-axial collection  VISUALIZED ORBITS AND PARANASAL SINUSES:  Intact globes and orbits  Mild mucosal thickening in the right maxillary sinus, chronic  The CERVICAL VASCULATURE AORTIC ARCH AND GREAT VESSELS:  Three-vessel comparison the aortic arch  No stenosis in the subclavian arteries  RIGHT VERTEBRAL ARTERY CERVICAL SEGMENT:  Normal origin  The vessel is normal in caliber throughout the neck  LEFT VERTEBRAL ARTERY CERVICAL SEGMENT:  Normal origin  The vessel is normal in caliber throughout the neck  RIGHT EXTRACRANIAL CAROTID SEGMENT:  Normal caliber common carotid artery  Normal bifurcation and cervical internal carotid artery  No stenosis or dissection  LEFT EXTRACRANIAL CAROTID SEGMENT:  Normal caliber common carotid artery  Normal bifurcation and cervical internal carotid artery  No stenosis or dissection  NASCET criteria was used to determine the degree of internal carotid artery diameter stenosis  INTRACRANIAL VASCULATURE INTERNAL CAROTID ARTERIES:  Normal enhancement of the intracranial portions of the internal carotid arteries  Normal ophthalmic artery origins  Normal ICA terminus  ANTERIOR CIRCULATION:  Symmetric A1 segments and anterior cerebral arteries with normal enhancement  Normal anterior communicating artery  MIDDLE CEREBRAL ARTERY CIRCULATION:  M1 segment and middle cerebral artery branches demonstrate normal enhancement bilaterally  DISTAL VERTEBRAL ARTERIES:  Normal distal vertebral arteries    Posterior inferior cerebellar artery origins are normal  Normal vertebral basilar junction  BASILAR ARTERY:  Basilar artery is normal in caliber  Normal superior cerebellar arteries  POSTERIOR CEREBRAL ARTERIES: Fetal type right and nearly fetal type left posterior cerebral arteries without stenosis  DURAL VENOUS SINUSES:  Patent  NON VASCULAR ANATOMY BONY STRUCTURES:  No lytic or blastic lesion  SOFT TISSUES OF THE NECK:  No mass or lymphadenopathy  THORACIC INLET:  Clear lung apices  Impression: 1  No hemodynamically significant stenosis, dissection or occlusion of the carotid or vertebral arteries or major vessels of the [place]  2   No evidence of intracranial hemorrhage, infarct or mass effect  Workstation performed: [id number]     Xr Chest 2 Views    Result Date: 2/11/2020  Narrative: CHEST INDICATION:   Chest Pain  COMPARISON:  11/27/2010 EXAM PERFORMED/VIEWS:  XR CHEST PA & LATERAL FINDINGS: Cardiomediastinal silhouette appears unremarkable  The lungs are clear  No pneumothorax or pleural effusion  Osseous structures appear within normal limits for patient age  Impression: No acute cardiopulmonary disease  Workstation performed: [id number]     Mri Cardiac  W Wo Contrast    Result Date: 2/16/2020  Narrative: 22year old man for evaluation for myocarditis  Technique: 1  3 plane SSFP localizers  2  SSFP cine imaging in long, short, and axial planes  3  T1 weighted DIR FSE without fat saturation and T1 weighted TIR FSE in axial plane  4  18 ml gadobutrol power injected  5  2D inversion recovery FGRE for delayed myocardial enhancement  6  The patient tolerated the procedure well without complication   Measurements: Efren-septal wall 11 mm Postero-lateral wall 9 mm Left ventricular end-diastolic dimension 55 mm Left ventricular end-systolic dimension 45 mm Left ventricular end-diastolic volume 638 ml Left ventricular end-systolic volume  013 ml Stroke volume 111 ml Cardiac Output 6 8 L/min Ejection fraction 52 % Left atrium 36 mm Aortic Root 29 mm Findings:  1  The left ventricle is top normal in size with top normal wall thickness  Left ventricular systolic function is low-normal with a measured ejection fraction of 52%  There is mild hypokinesis of the mid inferior wall  2  The right ventricle is normal in size with normal right ventricular systolic function  3  The aortic, mitral, and tricuspid valves open without restriction  There is no significant valvular regurgitation, however cine MRI is inaccurate in the qualitative assessment of valvular regurgitation  4  The left atrium is normal in size  The aortic root is normal in size  5  There is no evidence of fibrofatty infiltration into the right ventricular myocardium  6  Delayed post-gadolinium imaging demonstrates patchy subepicardial hyperenhancement of the basal to mid inferior and inferolateral walls  Impression: Impression: 1  Top normal left ventricular size with low-normal left ventricular systolic function  2  Normal right ventricular size and systolic function  3  No significant valvular abnormalities  4  Patchy subepicardial fibrosis of the basal to mid inferior and inferolateral walls  This is highly suggestive of myopericarditis  Workstation performed: [id number]       Review of Systems:  Review of Systems   Constitutional: Negative  HENT: Negative  Respiratory: Negative  Cardiovascular: Negative  Musculoskeletal: Negative  Neurological: Negative  Physical Exam:  /82   Pulse 79   Ht 5' 8" (1 727 m)   Wt 78 5 kg (173 lb)   SpO2 99%   BMI 26 30 kg/m²   Physical Exam  Constitutional:       General: He is not in acute distress  Appearance: He is well-developed  He is not diaphoretic  HENT:      Head: Normocephalic and atraumatic  Eyes:      General: No scleral icterus  Right eye: No discharge  Left eye: No discharge        Conjunctiva/sclera: Conjunctivae normal       Pupils: Pupils are equal, round, and reactive to light  Neck:      Thyroid: No thyromegaly  Trachea: No tracheal deviation  Cardiovascular:      Rate and Rhythm: Normal rate and regular rhythm  Heart sounds: No murmur heard  No friction rub  Pulmonary:      Effort: Pulmonary effort is normal  No respiratory distress  Breath sounds: No stridor  Abdominal:      General: Bowel sounds are normal  There is no distension  Palpations: Abdomen is soft  Tenderness: There is no abdominal tenderness  Musculoskeletal:         General: No deformity  Normal range of motion  Cervical back: Normal range of motion  Skin:     General: Skin is warm  Coloration: Skin is not pale  Findings: No erythema or rash

## 2022-04-04 ENCOUNTER — HOSPITAL ENCOUNTER (OUTPATIENT)
Dept: NON INVASIVE DIAGNOSTICS | Facility: CLINIC | Age: 27
Discharge: HOME | End: 2022-04-04
Payer: COMMERCIAL

## 2022-04-04 DIAGNOSIS — R00.2 PALPITATIONS: ICD-10-CM

## 2022-04-04 PROCEDURE — 93226 XTRNL ECG REC<48 HR SCAN A/R: CPT

## 2022-04-04 PROCEDURE — 93225 XTRNL ECG REC<48 HRS REC: CPT

## 2022-04-07 LAB
BUN SERPL-MCNC: 18 MG/DL (ref 7–25)
BUN/CREAT SERPL: NORMAL (CALC) (ref 6–22)
CALCIUM SERPL-MCNC: 9.9 MG/DL (ref 8.6–10.3)
CHLORIDE SERPL-SCNC: 101 MMOL/L (ref 98–110)
CO2 SERPL-SCNC: 30 MMOL/L (ref 20–32)
CREAT SERPL-MCNC: 1.01 MG/DL (ref 0.6–1.35)
GLUCOSE SERPL-MCNC: 92 MG/DL (ref 65–99)
POTASSIUM SERPL-SCNC: 4.2 MMOL/L (ref 3.5–5.3)
SL AMB EGFR AFRICAN AMERICAN: 120 ML/MIN/1.73M2
SL AMB EGFR NON AFRICAN AMERICAN: 104 ML/MIN/1.73M2
SODIUM SERPL-SCNC: 138 MMOL/L (ref 135–146)
TSH SERPL-ACNC: 4 MIU/L (ref 0.4–4.5)

## 2022-04-11 PROCEDURE — 93227 XTRNL ECG REC<48 HR R&I: CPT | Performed by: INTERNAL MEDICINE

## 2022-05-04 DIAGNOSIS — F51.01 PRIMARY INSOMNIA: ICD-10-CM

## 2022-05-05 RX ORDER — TRAZODONE HYDROCHLORIDE 50 MG/1
50 TABLET ORAL
Qty: 30 TABLET | Refills: 3 | Status: SHIPPED | OUTPATIENT
Start: 2022-05-05 | End: 2022-08-01

## 2022-07-29 DIAGNOSIS — F51.01 PRIMARY INSOMNIA: ICD-10-CM

## 2022-08-01 RX ORDER — TRAZODONE HYDROCHLORIDE 50 MG/1
TABLET ORAL
Qty: 90 TABLET | Refills: 1 | Status: SHIPPED | OUTPATIENT
Start: 2022-08-01

## 2022-08-15 ENCOUNTER — OFFICE VISIT (OUTPATIENT)
Dept: FAMILY MEDICINE CLINIC | Facility: MEDICAL CENTER | Age: 27
End: 2022-08-15
Payer: COMMERCIAL

## 2022-08-15 ENCOUNTER — TELEPHONE (OUTPATIENT)
Dept: FAMILY MEDICINE CLINIC | Facility: MEDICAL CENTER | Age: 27
End: 2022-08-15

## 2022-08-15 VITALS
HEIGHT: 68 IN | HEART RATE: 85 BPM | TEMPERATURE: 98.7 F | BODY MASS INDEX: 25.76 KG/M2 | OXYGEN SATURATION: 98 % | WEIGHT: 170 LBS | SYSTOLIC BLOOD PRESSURE: 130 MMHG | DIASTOLIC BLOOD PRESSURE: 80 MMHG

## 2022-08-15 DIAGNOSIS — F41.1 GAD (GENERALIZED ANXIETY DISORDER): Primary | ICD-10-CM

## 2022-08-15 DIAGNOSIS — E78.2 MIXED HYPERLIPIDEMIA: ICD-10-CM

## 2022-08-15 DIAGNOSIS — K12.0 CANKER SORE: ICD-10-CM

## 2022-08-15 PROCEDURE — 3725F SCREEN DEPRESSION PERFORMED: CPT | Performed by: FAMILY MEDICINE

## 2022-08-15 PROCEDURE — 99214 OFFICE O/P EST MOD 30 MIN: CPT | Performed by: FAMILY MEDICINE

## 2022-08-15 RX ORDER — LIDOCAINE HYDROCHLORIDE 20 MG/ML
15 SOLUTION OROPHARYNGEAL 4 TIMES DAILY PRN
Qty: 100 ML | Refills: 1 | Status: SHIPPED | OUTPATIENT
Start: 2022-08-15

## 2022-08-15 RX ORDER — ROSUVASTATIN CALCIUM 10 MG/1
10 TABLET, COATED ORAL DAILY
Qty: 90 TABLET | Refills: 3 | Status: SHIPPED | OUTPATIENT
Start: 2022-08-15

## 2022-08-15 RX ORDER — BUPROPION HYDROCHLORIDE 150 MG/1
150 TABLET ORAL EVERY MORNING
Qty: 30 TABLET | Refills: 5 | Status: SHIPPED | OUTPATIENT
Start: 2022-08-15 | End: 2022-09-15

## 2022-08-15 NOTE — ASSESSMENT & PLAN NOTE
He is still having a lot of problem with anxiety  He has had it a long time  It has not responded to well with SSRIs  He has got a good job, he is working out, he just cannot settle down to watch TV or play a game  He has always been like that  Apparently he was not a good student in school, at that time he had a lot of people telling him he should be evaluated for ADHD  Therefore we will try Wellbutrin 150 mg q day  we will recheck in a month

## 2022-08-15 NOTE — ASSESSMENT & PLAN NOTE
He needs a refill for rosuvastatin  I have given him a prescription for that  It has been effective and well-tolerated  He has not gone for repeat lipid profile after starting the rosuvastatin  He needs to get a lipid profile

## 2022-08-15 NOTE — TELEPHONE ENCOUNTER
Triaged- c/o large white sore ( canker sore) on the left side of the throat  Denies any respiratory symptoms  Appt advised    Appt today

## 2022-08-15 NOTE — TELEPHONE ENCOUNTER
Pt called with sx of ST, and white spots in back of throat  Pt not vaccinated, and did not do a covid test due to he said he doesn't have any sick sx's  Please triage

## 2022-08-24 NOTE — PROGRESS NOTES
Assessment/Plan:    TROY (generalized anxiety disorder)  He is still having a lot of problem with anxiety  He has had it a long time  It has not responded to well with SSRIs  He has got a good job, he is working out, he just cannot settle down to watch TV or play a game  He has always been like that  Apparently he was not a good student in school, at that time he had a lot of people telling him he should be evaluated for ADHD  Therefore we will try Wellbutrin 150 mg q day  we will recheck in a month  Hyperlipidemia  He needs a refill for rosuvastatin  I have given him a prescription for that  It has been effective and well-tolerated  He has not gone for repeat lipid profile after starting the rosuvastatin  He needs to get a lipid profile  Diagnoses and all orders for this visit:    TROY (generalized anxiety disorder)  -     Ambulatory Referral to [facility]; Future  -     buPROPion (Wellbutrin XL) 150 mg 24 hr tablet; Take 1 tablet (150 mg total) by mouth every morning    Mixed hyperlipidemia  -     rosuvastatin (CRESTOR) 10 MG tablet; Take 1 tablet (10 mg total) by mouth daily  -     Lipid Panel with Direct LDL reflex; Future    Canker sore  Comments:  One week canker sore on left tonsil  Exam was consistent  Prescription for viscous lidocaine and recommended peroxide containing mouthwash  Orders:  -     Lidocaine Viscous HCl (XYLOCAINE) 2 % mucosal solution; Swish and spit 15 mL 4 (four) times a day as needed for mouth pain or discomfort        Subjective:      Patient ID: Agnes Rodríguez is a 23 y o  male  The following portions of the patient's history were reviewed and updated as appropriate: allergies, current medications, past family history, past medical history, past social history, past surgical history and problem list     Review of Systems   Constitutional: Negative  HENT: Negative  Eyes: Negative  Respiratory: Negative      Cardiovascular: Negative  Gastrointestinal: Negative  Genitourinary: Negative  Musculoskeletal: Negative  Neurological: Negative  Psychiatric/Behavioral: Negative  Objective:      /80 (BP Location: Left arm, Patient Position: Sitting, Cuff Size: Large)   Pulse 85   Temp 98 7 °F (37 1 °C)   Ht 5' 8" (1 727 m)   Wt 77 1 kg (170 lb)   SpO2 98%   BMI 25 85 kg/m²          Physical Exam  Constitutional:       General: He is not in acute distress  Appearance: He is well-developed  He is not diaphoretic  HENT:      Head: Normocephalic  Right Ear: External ear normal       Left Ear: External ear normal       Nose: Nose normal       Mouth/Throat:      Mouth: Mucous membranes are moist    Eyes:      Extraocular Movements: Extraocular movements intact  Conjunctiva/sclera: Conjunctivae normal       Pupils: Pupils are equal, round, and reactive to light  Cardiovascular:      Rate and Rhythm: Normal rate  Pulses: Normal pulses  Pulmonary:      Effort: Pulmonary effort is normal  No respiratory distress  Musculoskeletal:         General: Normal range of motion  Cervical back: Normal range of motion  Skin:     General: Skin is warm and dry  Neurological:      General: No focal deficit present  Mental Status: He is alert and oriented to person, place, and time  Psychiatric:         Behavior: Behavior normal          Thought Content:  Thought content normal          Judgment: Judgment normal

## 2022-09-13 DIAGNOSIS — F41.1 GAD (GENERALIZED ANXIETY DISORDER): ICD-10-CM

## 2022-09-15 RX ORDER — BUPROPION HYDROCHLORIDE 150 MG/1
TABLET ORAL
Qty: 90 TABLET | Refills: 2 | Status: SHIPPED | OUTPATIENT
Start: 2022-09-15

## 2022-11-02 LAB
CHOLEST SERPL-MCNC: 260 MG/DL
CHOLEST/HDLC SERPL: 3.3 (CALC)
HDLC SERPL-MCNC: 79 MG/DL
LDLC SERPL CALC-MCNC: 159 MG/DL (CALC)
NONHDLC SERPL-MCNC: 181 MG/DL (CALC)
TRIGL SERPL-MCNC: 108 MG/DL

## 2022-11-07 ENCOUNTER — TELEPHONE (OUTPATIENT)
Dept: FAMILY MEDICINE CLINIC | Facility: MEDICAL CENTER | Age: 27
End: 2022-11-07

## 2022-11-07 NOTE — TELEPHONE ENCOUNTER
It is very high, I recommend a cholesterol lowering medication    he shouldn't go through life with that high chol

## 2022-11-07 NOTE — TELEPHONE ENCOUNTER
Pt agreeable to starting a medication, please send to [facility] in [id number] [address]

## 2022-11-08 DIAGNOSIS — E78.2 MODERATE MIXED HYPERLIPIDEMIA NOT REQUIRING STATIN THERAPY: Primary | ICD-10-CM

## 2022-11-08 RX ORDER — ROSUVASTATIN CALCIUM 20 MG/1
20 TABLET, COATED ORAL DAILY
Qty: 90 TABLET | Refills: 3 | Status: SHIPPED | OUTPATIENT
Start: 2022-11-08 | End: 2023-05-24 | Stop reason: SDUPTHER

## 2022-11-08 NOTE — TELEPHONE ENCOUNTER
Pt called back about this and is asking if this medication can be sent in to the pharmacy today  He said he needs it sent in today since his cholesterol is 260 and he needs to get on this medications

## 2022-12-05 ENCOUNTER — OFFICE VISIT (OUTPATIENT)
Dept: FAMILY MEDICINE CLINIC | Facility: CLINIC | Age: 27
End: 2022-12-05

## 2022-12-05 VITALS
TEMPERATURE: 96.7 F | RESPIRATION RATE: 18 BRPM | OXYGEN SATURATION: 98 % | DIASTOLIC BLOOD PRESSURE: 84 MMHG | SYSTOLIC BLOOD PRESSURE: 124 MMHG | WEIGHT: 176.5 LBS | HEIGHT: 66 IN | BODY MASS INDEX: 28.37 KG/M2 | HEART RATE: 60 BPM

## 2022-12-05 DIAGNOSIS — F41.1 GAD (GENERALIZED ANXIETY DISORDER): ICD-10-CM

## 2022-12-05 DIAGNOSIS — Z11.4 SCREENING FOR HIV (HUMAN IMMUNODEFICIENCY VIRUS): ICD-10-CM

## 2022-12-05 DIAGNOSIS — G89.29 CHRONIC MIDLINE THORACIC BACK PAIN: ICD-10-CM

## 2022-12-05 DIAGNOSIS — M54.6 CHRONIC MIDLINE THORACIC BACK PAIN: ICD-10-CM

## 2022-12-05 DIAGNOSIS — Z11.59 NEED FOR HEPATITIS C SCREENING TEST: ICD-10-CM

## 2022-12-05 DIAGNOSIS — R00.2 PALPITATIONS: ICD-10-CM

## 2022-12-05 DIAGNOSIS — Z00.00 ANNUAL PHYSICAL EXAM: Primary | ICD-10-CM

## 2022-12-05 DIAGNOSIS — E78.2 MIXED HYPERLIPIDEMIA: ICD-10-CM

## 2022-12-05 DIAGNOSIS — A63.0 CONDYLOMA ACUMINATA: ICD-10-CM

## 2022-12-05 RX ORDER — METHOCARBAMOL 500 MG/1
500 TABLET, FILM COATED ORAL 4 TIMES DAILY
Qty: 56 TABLET | Refills: 0 | Status: SHIPPED | OUTPATIENT
Start: 2022-12-05 | End: 2022-12-19

## 2022-12-05 NOTE — PROGRESS NOTES
ADULT ANNUAL [address]    NAME: Sheila Escobar  AGE: 33 y o  SEX: male  : 1995     DATE: 2022     Assessment and Plan:     Problem List Items Addressed This Visit        Other    Palpitations    Relevant Orders    CBC and differential    TSH, 3rd generation with Free T4 reflex    TROY (generalized anxiety disorder)    Relevant Orders    CBC and differential    Comprehensive metabolic panel    TSH, 3rd generation with Free T4 reflex    Hyperlipidemia    Relevant Orders    Lipid panel   Other Visit Diagnoses     Annual physical exam    -  Primary    Chronic midline thoracic back pain        Relevant Medications    methocarbamol (ROBAXIN) 500 mg tablet    Screening for HIV (human immunodeficiency virus)        Relevant Orders    HIV 1/2 ANTIGEN/ANTIBODY (4TH GENERATION) W REFLEX [facility]    Need for hepatitis C screening test        Relevant Orders    Hepatitis C antibody    Condyloma acuminata        Relevant Orders    Lesion Destruction (Completed)        Lesion Destruction    Date/Time: 2022 4:29 PM  Performed by: Jatinder Pope MD  Authorized by: Jatinder Pope MD   Universal Protocol:  Consent: Verbal consent obtained  Consent given by: patient  Patient understanding: patient states understanding of the procedure being performed  Patient identity confirmed: verbally with patient      Procedure Details - Lesion Destruction:     Number of Lesions:  6  Lesion 1:     Body area: Anogenital    Anogenital location:  Penis    Destruction method: cryotherapy    Lesion 2:     Body area: Anogenital    Anogenital location:  Penis    Destruction method: cryotherapy    Lesion 3:     Body area: Anogenital    Anogenital location:  Penis    Destruction method: cryotherapy    Lesion 4:     Body area: Anogenital    Anogenital location:  Penis    Destruction method: cryotherapy    Lesion 5:     Body area:   Anogenital Anogenital location:  Penis    Destruction method: cryotherapy    Lesion 6:     Body area: Anogenital    Anogenital location:  Penis    Destruction method: cryotherapy       Tolerated procedure well  Counseled patient on keeping area clean  Topical antibiotic ointment provided if needed        Immunizations and preventive care screenings were discussed with patient today  Appropriate education was printed on patient's after visit summary  Counseling:  Alcohol/drug use: discussed moderation in alcohol intake, the recommendations for healthy alcohol use, and avoidance of illicit drug use  Dental Health: discussed importance of regular tooth brushing, flossing, and dental visits  Injury prevention: discussed safety/seat belts, safety helmets, smoke detectors, carbon dioxide detectors, and smoking near bedding or upholstery  Sexual health: discussed sexually transmitted diseases, partner selection, use of condoms, avoidance of unintended pregnancy, and contraceptive alternatives  Exercise: the importance of regular exercise/physical activity was discussed  Recommend exercise 3-5 times per week for at least 30 minutes  BMI Counseling: Body mass index is 28 49 kg/m²  The BMI is above normal  Nutrition recommendations include decreasing portion sizes, reducing intake of saturated and trans fat and reducing intake of cholesterol  Exercise recommendations include moderate physical activity 150 minutes/week  No pharmacotherapy was ordered  Patient referred to PCP  Rationale for BMI follow-up plan is due to patient being overweight or obese  No follow-ups on file  Chief Complaint:     Chief Complaint   Patient presents with   • Establish Care     Physical        History of Present Illness:     Adult Annual Physical   Patient here for a comprehensive physical exam  The patient reports problems - Genital warts  Condylox  Diet and Physical Activity  Diet/Nutrition: well balanced diet     Exercise: 5-7 times a week on average  Pre-workout  Depression Screening  PHQ-2/9 Depression Screening         General Health  Sleep: sleeps well and Occassional insomnia  Hearing: normal - bilateral   Vision: no vision problems  Dental: regular dental visits   Health  History of STDs?: yes  Genital warts  Review of Systems:     Review of Systems   Past Medical History:     Past Medical History:   Diagnosis Date   • Anxiety    • Myopericarditis 3/9/2020   • Other chest pain 2/11/2020   • Spina bifida occulta 11/3/2015      Past Surgical History:     History reviewed  No pertinent surgical history     Social History:     Social History     Socioeconomic History   • Marital status: Single     Spouse name: None   • Number of children: None   • Years of education: None   • Highest education level: None   Occupational History   • None   Tobacco Use   • Smoking status: Former   • Smokeless tobacco: Former     Types: Chew   Vaping Use   • Vaping Use: Former   Substance and Sexual Activity   • Alcohol use: Yes     Comment: Socially 3-4 days a week    • Drug use: Yes     Types: Marijuana     Comment: 4 times a week at night   • Sexual activity: Yes     Partners: Female     Birth control/protection: None   Other Topics Concern   • None   Social History Narrative    Caffeine use     Social Determinants of Health     Financial Resource Strain: Not on file   Food Insecurity: Not on file   Transportation Needs: Not on file   Physical Activity: Not on file   Stress: Not on file   Social Connections: Not on file   Intimate Partner Violence: Not on file   Housing Stability: Not on file      Family History:     Family History   Problem Relation Age of Onset   • Hyperlipidemia Mother    • No Known Problems Father    • ADD / ADHD Sister       Current Medications:     Current Outpatient Medications   Medication Sig Dispense Refill   • methocarbamol (ROBAXIN) 500 mg tablet Take 1 tablet (500 mg total) by mouth 4 (four) times a day for 14 days 56 tablet 0   • rosuvastatin (CRESTOR) 20 MG tablet Take 1 tablet (20 mg total) by mouth daily 90 tablet 3   • traZODone (DESYREL) 50 mg tablet TAKE 1 TABLET BY MOUTH DAILY AT BEDTIME 90 tablet 1   • Lidocaine Viscous HCl (XYLOCAINE) 2 % mucosal solution Swish and spit 15 mL 4 (four) times a day as needed for mouth pain or discomfort (Patient not taking: Reported on 12/5/2022) 100 mL 1   • podofilox (CONDYLOX) 0 5 % external solution Apply topically 2 (two) times a day (Patient not taking: Reported on 3/28/2022) 3 5 mL 2     No current facility-administered medications for this visit  Allergies:     No Known Allergies   Physical Exam:     /84 (BP Location: Left arm, Patient Position: Sitting, Cuff Size: Large)   Pulse 60   Temp (!) 96 7 °F (35 9 °C)   Resp 18   Ht 5' 6" (1 676 m)   Wt 80 1 kg (176 lb 8 oz)   SpO2 98%   BMI 28 49 kg/m²     Physical Exam  Vitals reviewed  Constitutional:       Appearance: Normal appearance  HENT:      Head: Normocephalic and atraumatic  Right Ear: Tympanic membrane normal       Left Ear: Tympanic membrane normal       Mouth/Throat:      Mouth: Mucous membranes are moist    Eyes:      Extraocular Movements: Extraocular movements intact  Pupils: Pupils are equal, round, and reactive to light  Cardiovascular:      Rate and Rhythm: Normal rate and regular rhythm  Heart sounds: No murmur heard  Pulmonary:      Effort: Pulmonary effort is normal       Breath sounds: Normal breath sounds  Abdominal:      General: Abdomen is flat  Bowel sounds are normal       Palpations: Abdomen is soft  Musculoskeletal:         General: No swelling  Normal range of motion  Cervical back: Normal range of motion  Skin:     General: Skin is warm  Findings: Lesion and rash present  Rash is papular  Comments: Fleshy papules base penis and shaft   Neurological:      General: No focal deficit present  Mental Status: He is alert  Sensory: No sensory deficit  Deep Tendon Reflexes: Reflexes normal    Psychiatric:         Mood and Affect: Mood normal          Thought Content:  Thought content normal           Karen Jeffers MD   [address]

## 2022-12-05 NOTE — PATIENT INSTRUCTIONS

## 2022-12-12 DIAGNOSIS — M79.89 SWELLING OF LEFT HAND: Primary | ICD-10-CM

## 2023-01-19 ENCOUNTER — OFFICE VISIT (OUTPATIENT)
Dept: FAMILY MEDICINE CLINIC | Facility: CLINIC | Age: 28
End: 2023-01-19

## 2023-01-19 ENCOUNTER — TELEPHONE (OUTPATIENT)
Dept: OBGYN CLINIC | Facility: OTHER | Age: 28
End: 2023-01-19

## 2023-01-19 VITALS
HEART RATE: 76 BPM | TEMPERATURE: 98.7 F | BODY MASS INDEX: 29.09 KG/M2 | WEIGHT: 181 LBS | DIASTOLIC BLOOD PRESSURE: 80 MMHG | SYSTOLIC BLOOD PRESSURE: 140 MMHG | OXYGEN SATURATION: 97 % | HEIGHT: 66 IN

## 2023-01-19 DIAGNOSIS — S69.92XD HAND TRAUMA, LEFT, SUBSEQUENT ENCOUNTER: Primary | ICD-10-CM

## 2023-01-19 NOTE — PROGRESS NOTES
Name: Ceci Cervantes      : 1995      MRN: [id number]  Encounter Provider: Wei Brito MD  Encounter Date: 2023   Encounter department: [address] [place] [address] St     1  Hand trauma, left, subsequent encounter  -     Ambulatory Referral to Hand Surgery; Future  Swelling over the left third MCP  There is clicking when patient makes a fist   This is providing him with some discomfort  Patient can repeat x-ray of the left hand  We will also provide him with a referral to hand surgery for further evaluation       Subjective      Patient presents today continues to have swelling and discomfort in the left hand middle knuckle  This is secondary to an injury while punching a punching bag machine at a bar  He had x-rays at [facility] which were negative for fracture after the injury  However he still has swelling and clicking when making a fist   This is causing him some discomfort  Review of Systems   Musculoskeletal:        Left knuckle swelling and clicking      Skin: Negative for color change and rash  Neurological: Negative for weakness         Current Outpatient Medications on File Prior to Visit   Medication Sig   • rosuvastatin (CRESTOR) 20 MG tablet Take 1 tablet (20 mg total) by mouth daily   • traZODone (DESYREL) 50 mg tablet TAKE 1 TABLET BY MOUTH DAILY AT BEDTIME   • Lidocaine Viscous HCl (XYLOCAINE) 2 % mucosal solution Swish and spit 15 mL 4 (four) times a day as needed for mouth pain or discomfort (Patient not taking: Reported on 2022)   • methocarbamol (ROBAXIN) 500 mg tablet Take 1 tablet (500 mg total) by mouth 4 (four) times a day for 14 days   • podofilox (CONDYLOX) 0 5 % external solution Apply topically 2 (two) times a day (Patient not taking: Reported on 3/28/2022)       Objective     /80 (BP Location: Left arm, Patient Position: Sitting, Cuff Size: Standard)   Pulse 76   Temp 98 7 °F (37 1 °C)   Ht 5' 6" (1 676 m) Wt 82 1 kg (181 lb)   SpO2 97%   BMI 29 21 kg/m²     Physical Exam  Musculoskeletal:      Right hand: No swelling  Left hand: Swelling and tenderness present  Normal strength  Normal sensation  Comments: Left third MCP swelling and clicking   Skin:     General: Skin is warm     Psychiatric:         Mood and Affect: Mood normal          Behavior: Behavior normal        Drew Nielson MD

## 2023-01-19 NOTE — TELEPHONE ENCOUNTER
Patient is being referred to a orthopedics  Please schedule accordingly      [facility] [address]   [phone]

## 2023-01-25 ENCOUNTER — OFFICE VISIT (OUTPATIENT)
Dept: OBGYN CLINIC | Facility: CLINIC | Age: 28
End: 2023-01-25

## 2023-01-25 ENCOUNTER — HOSPITAL ENCOUNTER (OUTPATIENT)
Dept: RADIOLOGY | Facility: HOSPITAL | Age: 28
Discharge: HOME | End: 2023-01-25

## 2023-01-25 VITALS
HEART RATE: 87 BPM | SYSTOLIC BLOOD PRESSURE: 154 MMHG | DIASTOLIC BLOOD PRESSURE: 82 MMHG | BODY MASS INDEX: 28.32 KG/M2 | OXYGEN SATURATION: 99 % | WEIGHT: 176.2 LBS | HEIGHT: 66 IN

## 2023-01-25 DIAGNOSIS — S69.92XD HAND TRAUMA, LEFT, SUBSEQUENT ENCOUNTER: ICD-10-CM

## 2023-01-25 DIAGNOSIS — S63.659A SAGITTAL BAND RUPTURE AT METACARPOPHALANGEAL JOINT, INITIAL ENCOUNTER: ICD-10-CM

## 2023-01-25 DIAGNOSIS — M79.642 LEFT HAND PAIN: ICD-10-CM

## 2023-01-25 DIAGNOSIS — M79.642 LEFT HAND PAIN: Primary | ICD-10-CM

## 2023-01-25 NOTE — PROGRESS NOTES
ASSESSMENT/PLAN:    Assessment:   Left long finger MCP sprain, popping       - Concern for sagittal band rupture    Plan:   MRI      - He has some concern as to whether an MRI will be covered by insurance  If it is not, the plan will be to do an US  Follow Up: After Testing with Dr Joel Graham    To Do Next Visit:       General Discussions:           _____________________________________________________  CHIEF COMPLAINT:  Chief Complaint   Patient presents with   • Left Hand - Pain         SUBJECTIVE:  Josselin Crooks is a 33 y o  male who presents with Pain  Mild  Intermittant  Dull of the left long finger MCP joint  This started on 11-12-22  He had his friends were taking turns punching a punching bag machine-meter  On one of his many turns, he missed and hit the bar  He had immediate swelling  He had xrays done the next day and they were negative for fracture  Since then, he continues with popping sensations over the dorsal MCP joint  Radiation: None  Previous Treatments: NSAIDs   Associated symptoms: popping  Handedness: right      PAST MEDICAL HISTORY:  Past Medical History:   Diagnosis Date   • Anxiety    • Myopericarditis 3/9/2020   • Other chest pain 2/11/2020   • Spina bifida occulta 11/3/2015       PAST SURGICAL HISTORY:  History reviewed  No pertinent surgical history      FAMILY HISTORY:  Family History   Problem Relation Age of Onset   • Hyperlipidemia Mother    • No Known Problems Father    • ADD / ADHD Sister        SOCIAL HISTORY:  Social History     Tobacco Use   • Smoking status: Former   • Smokeless tobacco: Former     Types: Chew   Vaping Use   • Vaping Use: Former   Substance Use Topics   • Alcohol use: Yes     Comment: Socially 3-4 days a week    • Drug use: Yes     Types: Marijuana     Comment: 4 times a week at night       MEDICATIONS:    Current Outpatient Medications:   •  Lidocaine Viscous HCl (XYLOCAINE) 2 % mucosal solution, Swish and spit 15 mL 4 (four) times a day as needed for mouth pain or discomfort (Patient not taking: Reported on 12/5/2022), Disp: 100 mL, Rfl: 1  •  methocarbamol (ROBAXIN) 500 mg tablet, Take 1 tablet (500 mg total) by mouth 4 (four) times a day for 14 days, Disp: 56 tablet, Rfl: 0  •  podofilox (CONDYLOX) 0 5 % external solution, Apply topically 2 (two) times a day (Patient not taking: Reported on 3/28/2022), Disp: 3 5 mL, Rfl: 2  •  rosuvastatin (CRESTOR) 20 MG tablet, Take 1 tablet (20 mg total) by mouth daily, Disp: 90 tablet, Rfl: 3  •  traZODone (DESYREL) 50 mg tablet, TAKE 1 TABLET BY MOUTH DAILY AT BEDTIME, Disp: 90 tablet, Rfl: 1    ALLERGIES:  No Known Allergies    REVIEW OF SYSTEMS:  Pertinent items are noted in HPI  A comprehensive review of systems was negative  LABS:  HgA1c: No results found for: HGBA1C  BMP:   Lab Results   Component Value Date    CALCIUM 9 9 04/07/2022    K 4 2 04/07/2022    CO2 30 04/07/2022     04/07/2022    BUN 18 04/07/2022    CREATININE 1 01 04/07/2022         _____________________________________________________  PHYSICAL EXAMINATION:  Vital signs: /82   Pulse 87   Ht 5' 6" (1 676 m)   Wt 79 9 kg (176 lb 3 2 oz)   SpO2 99%   BMI 28 44 kg/m²   General: well developed and well nourished, alert, oriented times 3 and appears comfortable  Psychiatric: Normal  HEENT: Trachea Midline, No torticollis  Cardiovascular: Regular rate and rhythm  Pulmonary: No audible wheezing   Abdomen: No guarding  Extremities: No lymphedema  Skin: No masses, erythema, lacerations, fluctation, ulcerations  Neurovascular: Sensation Intact to the Median, Ulnar, Radial Nerve, Motor Intact to the Median, Ulnar, Radial Nerve and Pulses Intact    MUSCULOSKELETAL EXAMINATION:  LEFT SIDE:  Long Finger:  + mild swelling of the dorsal MCP   + palpable dorsoulnar popping  No lateral MCP laxity either with the finger extended or in full flexion  Full active / passive ROM all joints   NVI distally        _____________________________________________________  STUDIES REVIEWED:  3 xray views of the hand were done in the office today  No acute displaced fracture  Radiology reading pending        PROCEDURES PERFORMED:  Procedures  No Procedures performed today

## 2023-03-12 ENCOUNTER — HOSPITAL ENCOUNTER (EMERGENCY)
Facility: HOSPITAL | Age: 28
Discharge: HOME | End: 2023-03-12
Attending: EMERGENCY MEDICINE

## 2023-03-12 ENCOUNTER — APPOINTMENT (EMERGENCY)
Dept: RADIOLOGY | Facility: HOSPITAL | Age: 28
End: 2023-03-12

## 2023-03-12 VITALS
DIASTOLIC BLOOD PRESSURE: 77 MMHG | SYSTOLIC BLOOD PRESSURE: 137 MMHG | HEART RATE: 82 BPM | RESPIRATION RATE: 16 BRPM | OXYGEN SATURATION: 99 % | TEMPERATURE: 98.7 F

## 2023-03-12 DIAGNOSIS — S93.602A FOOT SPRAIN, LEFT, INITIAL ENCOUNTER: ICD-10-CM

## 2023-03-12 DIAGNOSIS — S93.492A SPRAIN OF ANTERIOR TALOFIBULAR LIGAMENT OF LEFT ANKLE, INITIAL ENCOUNTER: Primary | ICD-10-CM

## 2023-03-12 RX ORDER — ACETAMINOPHEN 325 MG/1
650 TABLET ORAL ONCE
Status: COMPLETED | OUTPATIENT
Start: 2023-03-12 | End: 2023-03-12

## 2023-03-12 RX ORDER — IBUPROFEN 400 MG/1
400 TABLET ORAL ONCE
Status: COMPLETED | OUTPATIENT
Start: 2023-03-12 | End: 2023-03-12

## 2023-03-12 RX ADMIN — IBUPROFEN 400 MG: 400 TABLET ORAL at 10:18

## 2023-03-12 RX ADMIN — ACETAMINOPHEN 650 MG: 325 TABLET ORAL at 10:18

## 2023-03-12 NOTE — Clinical Note
Lala Justice was seen and treated in our emergency department on 3/12/2023  Diagnosis:     Power Godinez  may return to work on return date  He may return on this date: 03/14/2023         If you have any questions or concerns, please don't hesitate to call        Shirley Jones MD    ______________________________           _______________          _______________  Hospital Representative                              Date                                Time

## 2023-03-12 NOTE — Clinical Note
Hannah Styles was seen and treated in our emergency department on 3/12/2023  Diagnosis:     Zachariah Payment  may return to work on return date  He may return on this date: 03/14/2023         If you have any questions or concerns, please don't hesitate to call        Cale Valenzuela RN    ______________________________           _______________          _______________  Hospital Representative                              Date                                Time

## 2023-03-12 NOTE — DISCHARGE INSTRUCTIONS
Diagnosis: left ankle/foot strain- overstretching of ligaments     - ace wrap-crutches as needed-- can apply intermitent ice to area for next 24 hrs     - for pain- can take over the counter generic tylenol 500 mg- together with over the counter generic ibuprofen 400 mg- 4 times per day with meals/ liquids    - if after 1 week - not any better- please [facility] psorts medicine to schedule an appointment

## 2023-03-15 NOTE — ED PROVIDER NOTES
History  Chief Complaint   Patient presents with   • Ankle Injury     Pt did back flip lastnight and landed on L ankle  Swelling/pain  Reports unable to bear weight on it     33 yr male last night did a backflip and landed awkwardly on left ankle- c/o latral left ankle pain/swelling and top of left  Foot injury - pain - no other comps or injuries       History provided by:  Patient   used: No    Ankle Injury      Prior to Admission Medications   Prescriptions Last Dose Informant Patient Reported? Taking? Lidocaine Viscous HCl (XYLOCAINE) 2 % mucosal solution   No No   Sig: Swish and spit 15 mL 4 (four) times a day as needed for mouth pain or discomfort   Patient not taking: Reported on 12/5/2022   methocarbamol (ROBAXIN) 500 mg tablet   No No   Sig: Take 1 tablet (500 mg total) by mouth 4 (four) times a day for 14 days   podofilox (CONDYLOX) 0 5 % external solution   No No   Sig: Apply topically 2 (two) times a day   Patient not taking: Reported on 3/28/2022   rosuvastatin (CRESTOR) 20 MG tablet   No No   Sig: Take 1 tablet (20 mg total) by mouth daily   traZODone (DESYREL) 50 mg tablet   No No   Sig: TAKE 1 TABLET BY MOUTH DAILY AT BEDTIME      Facility-Administered Medications: None       Past Medical History:   Diagnosis Date   • Anxiety    • Myopericarditis 3/9/2020   • Other chest pain 2/11/2020   • Spina bifida occulta 11/3/2015       History reviewed  No pertinent surgical history  Family History   Problem Relation Age of Onset   • Hyperlipidemia Mother    • No Known Problems Father    • ADD / ADHD Sister      I have reviewed and agree with the history as documented      E-Cigarette/Vaping   • E-Cigarette Use Former User      E-Cigarette/Vaping Substances     Social History     Tobacco Use   • Smoking status: Former   • Smokeless tobacco: Former     Types: Chew   Vaping Use   • Vaping Use: Former   Substance Use Topics   • Alcohol use: Yes     Comment: Socially 3-4 days a week    • Drug use: Yes     Types: Marijuana     Comment: 4 times a week at night       Review of Systems   Constitutional: Negative  HENT: Negative  Eyes: Negative  Respiratory: Negative  Cardiovascular: Negative  Gastrointestinal: Negative  Endocrine: Negative  Genitourinary: Negative  Musculoskeletal: Negative  Left lateral ankle/foot injury    Skin: Negative  Allergic/Immunologic: Negative  Neurological: Negative  Hematological: Negative  Psychiatric/Behavioral: Negative  Physical Exam  Physical Exam  Vitals and nursing note reviewed  Constitutional:       General: He is not in acute distress  Appearance: Normal appearance  He is not ill-appearing, toxic-appearing or diaphoretic  Comments: avss-  Pulse ox 99 % on ra- interpretation is normal- no intervention    Musculoskeletal:         General: Tenderness and signs of injury present  No swelling or deformity  Normal range of motion  Right lower leg: No edema  Left lower leg: No edema  Comments: lle- nt at hip/pelvis/ knee/high ankle -- lateral malloelar tenderness/sts- mild tenderness on mid dorsum of foot - normal achilles tendon function -- normal distal pulse-sensation/cap refill/rom    Neurological:      Mental Status: He is alert           Vital Signs  ED Triage Vitals [03/12/23 0957]   Temperature Pulse Respirations Blood Pressure SpO2   98 7 °F (37 1 °C) 82 16 137/77 99 %      Temp Source Heart Rate Source Patient Position - Orthostatic VS BP Location FiO2 (%)   Oral Monitor Sitting Right arm --      Pain Score       10 - Worst Possible Pain           Vitals:    03/12/23 0957   BP: 137/77   Pulse: 82   Patient Position - Orthostatic VS: Sitting         Visual Acuity      ED Medications  Medications   ibuprofen (MOTRIN) tablet 400 mg (400 mg Oral Given 3/12/23 1018)   acetaminophen (TYLENOL) tablet 650 mg (650 mg Oral Given 3/12/23 1018)       Diagnostic Studies  Results Reviewed     None XR ankle 3+ views LEFT   Final Result by Arlet Rosales MD (03/12 1806)      No acute osseous abnormality  Workstation performed: [id number]         XR foot 3+ views LEFT   Final Result by Arlet Rosales MD (03/12 1807)      No acute osseous abnormality  Workstation performed: [id number]                    Procedures  Procedures         ED Course  ED Course as of 03/15/23 1126   Sun Mar 12, 2023   1050 Left ankle/foot xray - no fx                                SBIRT 22yo+    Flowsheet Row Most Recent Value   SBIRT (23 yo +)    In order to provide better care to our patients, we are screening all of our patients for alcohol and drug use  Would it be okay to ask you these screening questions? Unable to answer at this time Filed at: 03/12/2023 1038                    MDM    Disposition  Final diagnoses:   Sprain of anterior talofibular ligament of left ankle, initial encounter   Foot sprain, left, initial encounter     Time reflects when diagnosis was documented in both MDM as applicable and the Disposition within this note     Time User Action Codes Description Comment    3/12/2023 11:04 AM Ormanjinderia Art Add [G14 728C] Sprain of anterior talofibular ligament of left ankle, initial encounter     3/12/2023 11:04 AM Orysia Art Add [S93 602A] Foot sprain, left, initial encounter       ED Disposition     ED Disposition   Discharge    Condition   Stable    Date/Time   Sun Mar 12, 2023 1104    [address] discharge to home/self care                 Follow-up Information     Follow up With Specialties Details Why [address]  Call  As needed [place]  [phone]          Discharge Medication List as of 3/12/2023 11:19 AM      CONTINUE these medications which have NOT CHANGED    Details   Lidocaine Viscous HCl (XYLOCAINE) 2 % mucosal solution Swish and spit 15 mL 4 (four) times a day as needed for mouth pain or discomfort, Starting Mon 8/15/2022, Normal      methocarbamol (ROBAXIN) 500 mg tablet Take 1 tablet (500 mg total) by mouth 4 (four) times a day for 14 days, Starting Mon 12/5/2022, Until Mon 12/19/2022, Normal      podofilox (CONDYLOX) 0 5 % external solution Apply topically 2 (two) times a day, Starting Fri 1/29/2021, Normal      rosuvastatin (CRESTOR) 20 MG tablet Take 1 tablet (20 mg total) by mouth daily, Starting Tue 11/8/2022, Normal      traZODone (DESYREL) 50 mg tablet TAKE 1 TABLET BY MOUTH DAILY AT BEDTIME, Normal             No discharge procedures on file      PDMP Review     None          ED Provider  Electronically Signed by           Khris Aguayo MD  03/15/23 5804

## 2023-04-05 ENCOUNTER — TELEPHONE (OUTPATIENT)
Dept: PSYCHIATRY | Facility: CLINIC | Age: 28
End: 2023-04-05

## 2023-05-24 DIAGNOSIS — E78.2 MODERATE MIXED HYPERLIPIDEMIA NOT REQUIRING STATIN THERAPY: ICD-10-CM

## 2023-05-24 RX ORDER — ROSUVASTATIN CALCIUM 20 MG/1
20 TABLET, COATED ORAL DAILY
Qty: 90 TABLET | Refills: 3 | Status: SHIPPED | OUTPATIENT
Start: 2023-05-24

## 2023-06-07 ENCOUNTER — OFFICE VISIT (OUTPATIENT)
Dept: FAMILY MEDICINE CLINIC | Facility: CLINIC | Age: 28
End: 2023-06-07
Payer: COMMERCIAL

## 2023-06-07 VITALS
BODY MASS INDEX: 29.01 KG/M2 | HEIGHT: 66 IN | DIASTOLIC BLOOD PRESSURE: 84 MMHG | OXYGEN SATURATION: 98 % | WEIGHT: 180.5 LBS | TEMPERATURE: 97.2 F | HEART RATE: 83 BPM | RESPIRATION RATE: 18 BRPM | SYSTOLIC BLOOD PRESSURE: 128 MMHG

## 2023-06-07 DIAGNOSIS — E78.2 MIXED HYPERLIPIDEMIA: ICD-10-CM

## 2023-06-07 DIAGNOSIS — F41.1 GAD (GENERALIZED ANXIETY DISORDER): Primary | ICD-10-CM

## 2023-06-07 DIAGNOSIS — R00.2 PALPITATIONS: ICD-10-CM

## 2023-06-07 PROCEDURE — 99214 OFFICE O/P EST MOD 30 MIN: CPT | Performed by: FAMILY MEDICINE

## 2023-06-07 RX ORDER — BUSPIRONE HYDROCHLORIDE 5 MG/1
5 TABLET ORAL 2 TIMES DAILY
Qty: 180 TABLET | Refills: 0 | Status: SHIPPED | OUTPATIENT
Start: 2023-06-07 | End: 2023-06-07

## 2023-06-07 RX ORDER — BUSPIRONE HYDROCHLORIDE 7.5 MG/1
7.5 TABLET ORAL 3 TIMES DAILY
Qty: 90 TABLET | Refills: 0 | Status: SHIPPED | OUTPATIENT
Start: 2023-06-07 | End: 2023-09-05

## 2023-06-07 NOTE — PROGRESS NOTES
Name: Shu Hercules      : 1995      MRN: [id number]  Encounter Provider: Sukhdeep Moon MD  Encounter Date: 2023   Encounter department: [facility] [facility] [address]     1  TROY (generalized anxiety disorder)  -     busPIRone (BUSPAR) 7 5 mg tablet; Take 1 tablet (7 5 mg total) by mouth 3 (three) times a day    2  Palpitations  Assessment & Plan:  Occasional palpitations but anxiety worsens  We will trial patient on new antianxiety medication  Continue to monitor palpitations  May benefit from beta-blocker if palpitations continue      3  Mixed hyperlipidemia  Assessment & Plan:  Continue rosuvastatin 20 mg daily             Subjective      Patient is reporting worsening anxiety  Does report some palpitations  Has tried prozac and Lexapro  Neither worked  He also was tried on Wellbutrin for what was suspected to be ADHD  Overall is doing well  Remains on rosuvastatin for cholesterol  Does have a history of myocarditis  Review of Systems   Constitutional: Negative for fatigue and fever  HENT: Negative for sore throat  Eyes: Negative for visual disturbance  Respiratory: Negative for cough, chest tightness and shortness of breath  Cardiovascular: Positive for palpitations  Negative for chest pain and leg swelling  Gastrointestinal: Negative for abdominal pain, constipation, diarrhea and nausea  Endocrine: Negative for cold intolerance and heat intolerance  Genitourinary: Negative for flank pain  Musculoskeletal: Negative for back pain and neck pain  Skin: Negative for rash  Neurological: Negative for headaches  Psychiatric/Behavioral: Negative for behavioral problems and confusion  The patient is nervous/anxious          Current Outpatient Medications on File Prior to Visit   Medication Sig   • rosuvastatin (CRESTOR) 20 MG tablet Take 1 tablet (20 mg total) by mouth daily   • traZODone (DESYREL) 50 mg tablet Take 1 tablet (50 mg "total) by mouth daily at bedtime   • [DISCONTINUED] Lidocaine Viscous HCl (XYLOCAINE) 2 % mucosal solution Swish and spit 15 mL 4 (four) times a day as needed for mouth pain or discomfort (Patient not taking: Reported on 12/5/2022)   • [DISCONTINUED] methocarbamol (ROBAXIN) 500 mg tablet Take 1 tablet (500 mg total) by mouth 4 (four) times a day for 14 days   • [DISCONTINUED] podofilox (CONDYLOX) 0 5 % external solution Apply topically 2 (two) times a day (Patient not taking: Reported on 3/28/2022)       Objective     /84 (BP Location: Left arm, Patient Position: Sitting, Cuff Size: Standard)   Pulse 83   Temp (!) 97 2 °F (36 2 °C) (Tympanic)   Resp 18   Ht 5' 6\" (1 676 m)   Wt 81 9 kg (180 lb 8 oz)   SpO2 98%   BMI 29 13 kg/m²     Physical Exam  Vitals and nursing note reviewed  Constitutional:       Appearance: He is well-developed  HENT:      Head: Normocephalic and atraumatic  Cardiovascular:      Rate and Rhythm: Normal rate and regular rhythm  Pulmonary:      Effort: Pulmonary effort is normal       Breath sounds: Normal breath sounds  Neurological:      General: No focal deficit present  Mental Status: He is alert     Psychiatric:         Mood and Affect: Mood normal          Behavior: Behavior normal        Marvin Marley MD  "

## 2023-06-07 NOTE — ASSESSMENT & PLAN NOTE
Occasional palpitations but anxiety worsens  We will trial patient on new antianxiety medication  Continue to monitor palpitations    May benefit from beta-blocker if palpitations continue

## 2023-06-29 DIAGNOSIS — F41.1 GAD (GENERALIZED ANXIETY DISORDER): ICD-10-CM

## 2023-06-29 RX ORDER — BUSPIRONE HYDROCHLORIDE 7.5 MG/1
TABLET ORAL
Qty: 270 TABLET | Refills: 1 | Status: SHIPPED | OUTPATIENT
Start: 2023-06-29

## 2023-06-30 ENCOUNTER — TELEPHONE (OUTPATIENT)
Dept: PSYCHIATRY | Facility: CLINIC | Age: 28
End: 2023-06-30

## 2023-06-30 NOTE — TELEPHONE ENCOUNTER
Due to no response to the [facility]s referral will be closed

## 2023-09-16 ENCOUNTER — HOSPITAL ENCOUNTER (EMERGENCY)
Facility: HOSPITAL | Age: 28
Discharge: HOME | End: 2023-09-16
Attending: STUDENT IN AN ORGANIZED HEALTH CARE EDUCATION/TRAINING PROGRAM
Payer: COMMERCIAL

## 2023-09-16 VITALS
WEIGHT: 193 LBS | RESPIRATION RATE: 16 BRPM | BODY MASS INDEX: 29.25 KG/M2 | SYSTOLIC BLOOD PRESSURE: 156 MMHG | OXYGEN SATURATION: 94 % | HEIGHT: 68 IN | HEART RATE: 111 BPM | DIASTOLIC BLOOD PRESSURE: 98 MMHG | TEMPERATURE: 97.9 F

## 2023-09-16 DIAGNOSIS — S80.212A ABRASION, LEFT KNEE, INITIAL ENCOUNTER: ICD-10-CM

## 2023-09-16 DIAGNOSIS — S80.211A ABRASION, RIGHT KNEE, INITIAL ENCOUNTER: ICD-10-CM

## 2023-09-16 DIAGNOSIS — S50.811A ABRASION OF RIGHT FOREARM, INITIAL ENCOUNTER: ICD-10-CM

## 2023-09-16 DIAGNOSIS — S50.812A ABRASION OF LEFT FOREARM, INITIAL ENCOUNTER: ICD-10-CM

## 2023-09-16 DIAGNOSIS — S01.111A LACERATION OF RIGHT EYEBROW, INITIAL ENCOUNTER: Primary | ICD-10-CM

## 2023-09-16 PROCEDURE — 99283 EMERGENCY DEPT VISIT LOW MDM: CPT

## 2023-09-16 PROCEDURE — 12011 RPR F/E/E/N/L/M 2.5 CM/<: CPT | Performed by: STUDENT IN AN ORGANIZED HEALTH CARE EDUCATION/TRAINING PROGRAM

## 2023-09-16 PROCEDURE — 99284 EMERGENCY DEPT VISIT MOD MDM: CPT | Performed by: STUDENT IN AN ORGANIZED HEALTH CARE EDUCATION/TRAINING PROGRAM

## 2023-09-16 RX ORDER — LIDOCAINE HYDROCHLORIDE AND EPINEPHRINE 10; 10 MG/ML; UG/ML
5 INJECTION, SOLUTION INFILTRATION; PERINEURAL ONCE
Status: COMPLETED | OUTPATIENT
Start: 2023-09-16 | End: 2023-09-16

## 2023-09-16 RX ADMIN — LIDOCAINE HYDROCHLORIDE,EPINEPHRINE BITARTRATE 5 ML: 10; .01 INJECTION, SOLUTION INFILTRATION; PERINEURAL at 01:07
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
## 2023-10-30 DIAGNOSIS — E78.2 MODERATE MIXED HYPERLIPIDEMIA NOT REQUIRING STATIN THERAPY: ICD-10-CM

## 2023-10-31 RX ORDER — ROSUVASTATIN CALCIUM 20 MG/1
20 TABLET, COATED ORAL DAILY
Qty: 90 TABLET | Refills: 0 | Status: SHIPPED | OUTPATIENT
Start: 2023-10-31

## 2023-11-30 ENCOUNTER — OFFICE VISIT (OUTPATIENT)
Dept: CARDIOLOGY CLINIC | Facility: CLINIC | Age: 28
End: 2023-11-30
Payer: COMMERCIAL

## 2023-11-30 VITALS
WEIGHT: 199 LBS | HEART RATE: 71 BPM | HEIGHT: 68 IN | SYSTOLIC BLOOD PRESSURE: 130 MMHG | DIASTOLIC BLOOD PRESSURE: 78 MMHG | BODY MASS INDEX: 30.16 KG/M2

## 2023-11-30 DIAGNOSIS — E78.5 HYPERLIPIDEMIA, UNSPECIFIED HYPERLIPIDEMIA TYPE: Primary | ICD-10-CM

## 2023-11-30 DIAGNOSIS — R52 TINGLING PAIN: ICD-10-CM

## 2023-11-30 DIAGNOSIS — R00.2 PALPITATIONS: ICD-10-CM

## 2023-11-30 DIAGNOSIS — I31.9 MYOPERICARDITIS: ICD-10-CM

## 2023-11-30 LAB
ALBUMIN SERPL-MCNC: 4.8 G/DL (ref 3.6–5.1)
ALBUMIN/GLOB SERPL: 1.8 (CALC) (ref 1–2.5)
ALP SERPL-CCNC: 65 U/L (ref 36–130)
ALT SERPL-CCNC: 52 U/L (ref 9–46)
AST SERPL-CCNC: 38 U/L (ref 10–40)
BASOPHILS # BLD AUTO: 19 CELLS/UL (ref 0–200)
BASOPHILS NFR BLD AUTO: 0.4 %
BILIRUB SERPL-MCNC: 0.6 MG/DL (ref 0.2–1.2)
BUN SERPL-MCNC: 21 MG/DL (ref 7–25)
BUN/CREAT SERPL: ABNORMAL (CALC) (ref 6–22)
CALCIUM SERPL-MCNC: 10.1 MG/DL (ref 8.6–10.3)
CHLORIDE SERPL-SCNC: 101 MMOL/L (ref 98–110)
CHOLEST SERPL-MCNC: 197 MG/DL
CHOLEST/HDLC SERPL: 3.5 (CALC)
CO2 SERPL-SCNC: 28 MMOL/L (ref 20–32)
CREAT SERPL-MCNC: 0.88 MG/DL (ref 0.6–1.24)
EOSINOPHIL # BLD AUTO: 99 CELLS/UL (ref 15–500)
EOSINOPHIL NFR BLD AUTO: 2.1 %
ERYTHROCYTE [DISTWIDTH] IN BLOOD BY AUTOMATED COUNT: 12.3 % (ref 11–15)
GFR/BSA.PRED SERPLBLD CYS-BASED-ARV: 121 ML/MIN/1.73M2
GLOBULIN SER CALC-MCNC: 2.6 G/DL (CALC) (ref 1.9–3.7)
GLUCOSE SERPL-MCNC: 96 MG/DL (ref 65–99)
HCT VFR BLD AUTO: 42.8 % (ref 38.5–50)
HCV AB SERPL QL IA: NORMAL
HDLC SERPL-MCNC: 56 MG/DL
HGB BLD-MCNC: 14.7 G/DL (ref 13.2–17.1)
HIV 1+2 AB+HIV1 P24 AG SERPL QL IA: NORMAL
LDLC SERPL CALC-MCNC: 119 MG/DL (CALC)
LYMPHOCYTES # BLD AUTO: 1626 CELLS/UL (ref 850–3900)
LYMPHOCYTES NFR BLD AUTO: 34.6 %
MCH RBC QN AUTO: 29.8 PG (ref 27–33)
MCHC RBC AUTO-ENTMCNC: 34.3 G/DL (ref 32–36)
MCV RBC AUTO: 86.6 FL (ref 80–100)
MONOCYTES # BLD AUTO: 428 CELLS/UL (ref 200–950)
MONOCYTES NFR BLD AUTO: 9.1 %
NEUTROPHILS # BLD AUTO: 2529 CELLS/UL (ref 1500–7800)
NEUTROPHILS NFR BLD AUTO: 53.8 %
NONHDLC SERPL-MCNC: 141 MG/DL (CALC)
PLATELET # BLD AUTO: 279 THOUSAND/UL (ref 140–400)
PMV BLD REES-ECKER: 9.9 FL (ref 7.5–12.5)
POTASSIUM SERPL-SCNC: 4.3 MMOL/L (ref 3.5–5.3)
PROT SERPL-MCNC: 7.4 G/DL (ref 6.1–8.1)
RBC # BLD AUTO: 4.94 MILLION/UL (ref 4.2–5.8)
SODIUM SERPL-SCNC: 137 MMOL/L (ref 135–146)
TRIGL SERPL-MCNC: 108 MG/DL
TSH SERPL-ACNC: 2.66 MIU/L (ref 0.4–4.5)
WBC # BLD AUTO: 4.7 THOUSAND/UL (ref 3.8–10.8)

## 2023-11-30 PROCEDURE — 93000 ELECTROCARDIOGRAM COMPLETE: CPT | Performed by: INTERNAL MEDICINE

## 2023-11-30 PROCEDURE — 99214 OFFICE O/P EST MOD 30 MIN: CPT | Performed by: INTERNAL MEDICINE

## 2023-11-30 NOTE — PROGRESS NOTES
Cardiology Follow Up    Eliud Murray  1995  [id number]  [facility] RACHAEL Rubin [facility] [address]  [place] [address] [facility]  [phone]    1. Hyperlipidemia, unspecified hyperlipidemia type  POCT ECG    Stress test only, exercise      2. Myopericarditis        3. Palpitations  Stress test only, exercise      4. Tingling pain  Stress test only, exercise          Diagnoses and all orders for this visit:    Hyperlipidemia, unspecified hyperlipidemia type  -     POCT ECG  -     Stress test only, exercise; Future    Myopericarditis    Palpitations  -     Stress test only, exercise; Future    Tingling pain  -     Stress test only, exercise; Future      I had the pleasure of seeing Eliud Murray for a follow up visit. INTERVAL HISTORY:  Feels better    History of the presenting illness, Discussion/Summary and My Plan are as follows:::    He is a 43-year-old with a history of myopericarditis, hyperlipidemia and palpitations,    He was admitted (in Feb 2020) with viral gastroenteritis symptoms of couple of days duration as well as chest pains, with a peak troponin of 16, with ECG showing diffuse ST elevations with an elevated CRP at 90, overall clinical picture suggested acute pericarditis, with echo as well as cardiac MRI showing inferior and inferolateral wall involvement. Echo in the hospital also showed mild spontaneous echo contrast even though LV function was normal.    He completed Motrin and subsequently colchicine also with no further recurrence of symptoms. Also had an echocardiogram that showed resolution of the spontaneous echo contrast.    Has had palpitations-he describes them exactly as ectopic beats, without any other associated symptoms.   Has had 1 presentation to the ER with negative evaluation, none recently    He has been physically very active, physical job,also works out 3 days a week, without any cardiac symptoms with physical activity. Looks well built. Plan:    History of Acute myopericarditis February 2020 with preserved LV function but with spontaneous echo contrast:    Completed Motrin and colchicine, repeat echo May 2020 showed resolution of spontaneous echo contrast  No further recurrences. Palpitations:  Likely ectopic beats-PVCs/PACs,similar to before, did a 48 hour Holter in April 2022 - only had about 300 PVCs  He takes a half scoop pre workout supplement ( about 15 mg of caffeine). Currently drinking only about a cup to 2 of coffee a day. Alcohol-about three days a week, 2-5 drinks, palpitations are less when he drinks,  He would like to hold off on a holter for now (unremarkable prior holter)  BMP was normal in 2022 but has blood work pending from today including a TSH, CMP, lipid profile    Dyslipidemia:  Surprisingly despite being a 52-year-old male, has made a lot of significant changes, low intake of butter and cheese, ground beef-90% lean-about twice a week   Likely has genetic dyslipidemia but will await his next set of blood work. Rosuvastatin prescribed since November 2020, will  await lipids  Ordered exercise treadmill stress test considering lifelong elevation in lipid profile and palpitations, has very atypical chest tingling without precipitating or relieving factors, 'feels like stress'    Follow-up in 12 months. Latest Reference Range & Units 02/05/21 07:29 10/07/21 11:37 11/01/22 14:49   Cholesterol <200 mg/dL 281 (H) 275 (H) 260 (H)   Triglycerides <150 mg/dL 157 (H) 91 108   HDL > OR = 40 mg/dL 73 72 79   Non-HDL Cholesterol <130 mg/dL (calc) 208 (H) 203 (H) 181 (H)   LDL Calculated mg/dL (calc) 178 (H) 182 (H) 159 (H)   Chol HDLC Ratio <5.0 (calc) 3.8 3.8 3.3   (H): Data is abnormally high    Results for Rina Wong (MRN [id number]) as of 3/28/2022 15:54   Ref.  Range 2/5/2021 07:29 10/7/2021 11:37   Cholesterol Latest Ref Range: <200 mg/dL 281 (H) 275 (H)   Triglycerides Latest Ref Range: <150 mg/dL 157 (H) 91   HDL Latest Ref Range: > OR = 40 mg/dL 73 72   Non-HDL Cholesterol Latest Ref Range: <130 mg/dL (calc) 208 (H) 203 (H)   LDL Calculated Latest Units: mg/dL (calc) 178 (H) 182 (H)   Chol HDLC Ratio Latest Ref Range: <5.0 (calc) 3.8 3.8       Patient Active Problem List   Diagnosis    Palpitations    TROY (generalized anxiety disorder)    Hyperlipidemia    Myopericarditis     Past Medical History:   Diagnosis Date    Anxiety     Myopericarditis 3/9/2020    Other chest pain 2/11/2020    Spina bifida occulta 11/3/2015     Social History     Socioeconomic History    Marital status: Single     Spouse name: Not on file    Number of children: Not on file    Years of education: Not on file    Highest education level: Not on file   Occupational History    Not on file   Tobacco Use    Smoking status: Former    Smokeless tobacco: Former     Types: Chew   Vaping Use    Vaping Use: Former   Substance and Sexual Activity    Alcohol use: Yes     Comment: Socially 3-4 days a week     Drug use: Yes     Types: Marijuana     Comment: 4 times a week at night    Sexual activity: Yes     Partners: Female     Birth control/protection: None   Other Topics Concern    Not on file   Social History Narrative    Caffeine use     Social Determinants of Health     Financial Resource Strain: Not on file   Food Insecurity: Not on file   Transportation Needs: Not on file   Physical Activity: Not on file   Stress: Not on file   Social Connections: Not on file   Intimate Partner Violence: Not on file   Housing Stability: Not on file      Family History   Problem Relation Age of Onset    Hyperlipidemia Mother     No Known Problems Father     ADD / ADHD Sister      No past surgical history on file.     Current Outpatient Medications:     rosuvastatin (CRESTOR) 20 MG tablet, Take 1 tablet (20 mg total) by mouth daily, Disp: 90 tablet, Rfl: 0    traZODone (DESYREL) 50 mg tablet, Take 1 tablet (50 mg total) by mouth daily at bedtime, Disp: 90 tablet, Rfl: 1    busPIRone (BUSPAR) 7.5 mg tablet, TAKE 1 TABLET BY MOUTH 3 TIMES A DAY. (Patient not taking: Reported on 9/16/2023), Disp: 270 tablet, Rfl: 1  No Known Allergies    Imaging: Cta Head And Neck With And Without Contrast    Result Date: 2/11/2020  Narrative: CTA NECK AND BRAIN WITH AND WITHOUT CONTRAST INDICATION: Neck pain COMPARISON:   7/29/2019. TECHNIQUE:  Routine CT imaging of the Brain without contrast.  Post contrast imaging was performed after administration of iodinated contrast through the neck and brain. Post contrast axial 0.625 mm images timed to opacify the arterial system. 3D rendering was performed on an independent workstation. MIP reconstructions performed. Coronal reconstructions were performed of the noncontrast portion of the brain. Radiation dose length product (DLP) for this visit:  763 mGy-cm . This examination, like all CT scans performed in the [facility], was performed utilizing techniques to minimize radiation dose exposure, including the use of iterative reconstruction and automated exposure control. IV Contrast:  85 mL of iohexol (OMNIPAQUE)  IMAGE QUALITY:   Diagnostic FINDINGS: NONCONTRAST BRAIN PARENCHYMA:  No evidence of acute vascular territorial infarction, intracranial hemorrhage or mass effect. VENTRICLES AND EXTRA-AXIAL SPACES:  No hydrocephalus or extra-axial collection. VISUALIZED ORBITS AND PARANASAL SINUSES:  Intact globes and orbits. Mild mucosal thickening in the right maxillary sinus, chronic. The CERVICAL VASCULATURE AORTIC ARCH AND GREAT VESSELS:  Three-vessel comparison the aortic arch. No stenosis in the subclavian arteries. RIGHT VERTEBRAL ARTERY CERVICAL SEGMENT:  Normal origin. The vessel is normal in caliber throughout the neck. LEFT VERTEBRAL ARTERY CERVICAL SEGMENT:  Normal origin. The vessel is normal in caliber throughout the neck.  RIGHT EXTRACRANIAL CAROTID SEGMENT:  Normal caliber common carotid artery. Normal bifurcation and cervical internal carotid artery. No stenosis or dissection. LEFT EXTRACRANIAL CAROTID SEGMENT:  Normal caliber common carotid artery. Normal bifurcation and cervical internal carotid artery. No stenosis or dissection. NASCET criteria was used to determine the degree of internal carotid artery diameter stenosis. INTRACRANIAL VASCULATURE INTERNAL CAROTID ARTERIES:  Normal enhancement of the intracranial portions of the internal carotid arteries. Normal ophthalmic artery origins. Normal ICA terminus. ANTERIOR CIRCULATION:  Symmetric A1 segments and anterior cerebral arteries with normal enhancement. Normal anterior communicating artery. MIDDLE CEREBRAL ARTERY CIRCULATION:  M1 segment and middle cerebral artery branches demonstrate normal enhancement bilaterally. DISTAL VERTEBRAL ARTERIES:  Normal distal vertebral arteries. Posterior inferior cerebellar artery origins are normal. Normal vertebral basilar junction. BASILAR ARTERY:  Basilar artery is normal in caliber. Normal superior cerebellar arteries. POSTERIOR CEREBRAL ARTERIES: Fetal type right and nearly fetal type left posterior cerebral arteries without stenosis. DURAL VENOUS SINUSES:  Patent. NON VASCULAR ANATOMY BONY STRUCTURES:  No lytic or blastic lesion. SOFT TISSUES OF THE NECK:  No mass or lymphadenopathy. THORACIC INLET:  Clear lung apices. Impression: 1. No hemodynamically significant stenosis, dissection or occlusion of the carotid or vertebral arteries or major vessels of the [place]. 2.  No evidence of intracranial hemorrhage, infarct or mass effect. Workstation performed: [id number]     Xr Chest 2 Views    Result Date: 2/11/2020  Narrative: CHEST INDICATION:   Chest Pain. COMPARISON:  11/27/2010 EXAM PERFORMED/VIEWS:  XR CHEST PA & LATERAL FINDINGS: Cardiomediastinal silhouette appears unremarkable. The lungs are clear.   No pneumothorax or pleural effusion. Osseous structures appear within normal limits for patient age. Impression: No acute cardiopulmonary disease. Workstation performed: [id number]     Mri Cardiac  W Wo Contrast    Result Date: 2/16/2020  Narrative: 22year old man for evaluation for myocarditis. Technique: 1. 3 plane SSFP localizers. 2. SSFP cine imaging in long, short, and axial planes. 3. T1 weighted DIR FSE without fat saturation and T1 weighted TIR FSE in axial plane. 4. 18 ml gadobutrol power injected. 5. 2D inversion recovery FGRE for delayed myocardial enhancement. 6. The patient tolerated the procedure well without complication. Measurements: Efren-septal wall 11 mm Postero-lateral wall 9 mm Left ventricular end-diastolic dimension 55 mm Left ventricular end-systolic dimension 45 mm Left ventricular end-diastolic volume 299 ml Left ventricular end-systolic volume  706 ml Stroke volume 111 ml Cardiac Output 6.8 L/min Ejection fraction 52 % Left atrium 36 mm Aortic Root 29 mm Findings:  1. The left ventricle is top normal in size with top normal wall thickness. Left ventricular systolic function is low-normal with a measured ejection fraction of 52%. There is mild hypokinesis of the mid inferior wall. 2. The right ventricle is normal in size with normal right ventricular systolic function. 3. The aortic, mitral, and tricuspid valves open without restriction. There is no significant valvular regurgitation, however cine MRI is inaccurate in the qualitative assessment of valvular regurgitation. 4. The left atrium is normal in size. The aortic root is normal in size. 5. There is no evidence of fibrofatty infiltration into the right ventricular myocardium. 6. Delayed post-gadolinium imaging demonstrates patchy subepicardial hyperenhancement of the basal to mid inferior and inferolateral walls. Impression: Impression: 1. Top normal left ventricular size with low-normal left ventricular systolic function.  2. Normal right ventricular size and systolic function. 3. No significant valvular abnormalities. 4. Patchy subepicardial fibrosis of the basal to mid inferior and inferolateral walls. This is highly suggestive of myopericarditis. Workstation performed: [id number]       Review of Systems:  Review of Systems   Constitutional: Negative. HENT: Negative. Respiratory: Negative. Cardiovascular: Negative. Musculoskeletal: Negative. Neurological: Negative. Physical Exam:  /78 (BP Location: Left arm, Patient Position: Sitting, Cuff Size: Large)   Pulse 71   Ht 5' 8" (1.727 m)   Wt 90.3 kg (199 lb)   BMI 30.26 kg/m²   Physical Exam  Constitutional:       General: He is not in acute distress. Appearance: He is well-developed. He is not diaphoretic. HENT:      Head: Normocephalic and atraumatic. Eyes:      General: No scleral icterus. Right eye: No discharge. Left eye: No discharge. Conjunctiva/sclera: Conjunctivae normal.      Pupils: Pupils are equal, round, and reactive to light. Neck:      Thyroid: No thyromegaly. Trachea: No tracheal deviation. Cardiovascular:      Rate and Rhythm: Normal rate and regular rhythm. Heart sounds: No murmur heard. No friction rub. Pulmonary:      Effort: Pulmonary effort is normal. No respiratory distress. Breath sounds: No stridor. Abdominal:      General: Bowel sounds are normal. There is no distension. Palpations: Abdomen is soft. Tenderness: There is no abdominal tenderness. Musculoskeletal:         General: No deformity. Normal range of motion. Cervical back: Normal range of motion. Skin:     General: Skin is warm. Coloration: Skin is not pale. Findings: No erythema or rash.

## 2024-03-14 DIAGNOSIS — F51.01 PRIMARY INSOMNIA: ICD-10-CM

## 2024-03-14 RX ORDER — TRAZODONE HYDROCHLORIDE 50 MG/1
50 TABLET ORAL
Qty: 90 TABLET | Refills: 1 | Status: SHIPPED | OUTPATIENT
Start: 2024-03-14

## 2024-07-28 NOTE — PROGRESS NOTES
Patient describes having significant anxiety which leads to some chest discomfort and tightness  He also tells me that he feels some irregular heartbeats which sound like an early beat  This chest discomfort is not a crushing substernal pain, he has no dyspnea no dizziness or lightheadedness  Patient is very healthy and athletic  Next    In terms of his anxiety he does not have good insight to what is driving and generating this anxiety  He does say that he has always felt anxious  He has no depression and there has been absolutely no suicidal ideation    Review of systems for GI  cardiac pulmonary and neurologic systems are all otherwise negative  ENT review of systems is also negative  /80   Pulse 60   Temp 97 7 °F (36 5 °C)   Wt 76 7 kg (169 lb)   SpO2 97%   BMI 28 12 kg/m²     HEENT examination is within normal limits no acute findings  Neck was supple  Chest clear  Cardiac exam revealed a regular rate and rhythm without murmur rub or gallop  Abdomen is soft and nontender  Anxiety, perhaps some panic disorder as well  Refer to [facility] psychiatric department  Will check a Holter monitor just to be sure that there are no pathologic arrhythmias  I doubt it though 
Attending with

## 2024-08-21 ENCOUNTER — OFFICE VISIT (OUTPATIENT)
Dept: FAMILY MEDICINE CLINIC | Facility: CLINIC | Age: 29
End: 2024-08-21
Payer: COMMERCIAL

## 2024-08-21 ENCOUNTER — TELEPHONE (OUTPATIENT)
Age: 29
End: 2024-08-21

## 2024-08-21 VITALS
BODY MASS INDEX: 26.98 KG/M2 | TEMPERATURE: 98.3 F | WEIGHT: 178 LBS | SYSTOLIC BLOOD PRESSURE: 128 MMHG | RESPIRATION RATE: 16 BRPM | DIASTOLIC BLOOD PRESSURE: 74 MMHG | HEART RATE: 78 BPM | OXYGEN SATURATION: 98 % | HEIGHT: 68 IN

## 2024-08-21 DIAGNOSIS — F90.0 ATTENTION DEFICIT HYPERACTIVITY DISORDER (ADHD), PREDOMINANTLY INATTENTIVE TYPE: ICD-10-CM

## 2024-08-21 DIAGNOSIS — Z00.00 ANNUAL PHYSICAL EXAM: Primary | ICD-10-CM

## 2024-08-21 DIAGNOSIS — B07.9 VERRUCA VULGARIS: ICD-10-CM

## 2024-08-21 DIAGNOSIS — F41.1 GAD (GENERALIZED ANXIETY DISORDER): ICD-10-CM

## 2024-08-21 PROBLEM — I31.9 MYOPERICARDITIS: Status: RESOLVED | Noted: 2022-03-28 | Resolved: 2024-08-21

## 2024-08-21 PROCEDURE — 99214 OFFICE O/P EST MOD 30 MIN: CPT | Performed by: FAMILY MEDICINE

## 2024-08-21 PROCEDURE — 99395 PREV VISIT EST AGE 18-39: CPT | Performed by: FAMILY MEDICINE

## 2024-08-21 RX ORDER — DEXTROAMPHETAMINE SACCHARATE, AMPHETAMINE ASPARTATE MONOHYDRATE, DEXTROAMPHETAMINE SULFATE AND AMPHETAMINE SULFATE 5; 5; 5; 5 MG/1; MG/1; MG/1; MG/1
20 CAPSULE, EXTENDED RELEASE ORAL EVERY MORNING
Qty: 30 CAPSULE | Refills: 0 | Status: SHIPPED | OUTPATIENT
Start: 2024-08-21 | End: 2024-08-22

## 2024-08-21 NOTE — PROGRESS NOTES
"Adult Annual Physical  Name: Primitivo Paris      : 1995      MRN: [id number]  Encounter Provider: Maryana Green MD  Encounter Date: 2024   Encounter department: [facility]    Assessment & Plan   1. Annual physical exam  2. TROY (generalized anxiety disorder)  3. Verruca vulgaris  -     Lesion Destruction  4. Attention deficit hyperactivity disorder (ADHD), predominantly inattentive type  Assessment & Plan:  ADHD questionnaire completed today. Start Adderall XR 20 mg daily. Monitor response to medication. Controlled substance agreement follow up visit if tolerated.     Historically struggled in school, Forgetful, innatentive. Self report scale positive.   Orders:  -     amphetamine-dextroamphetamine (ADDERALL XR, 20MG,) 20 MG 24 hr capsule; Take 1 capsule (20 mg total) by mouth every morning Max Daily Amount: 20 mg    Annual physical, problem visit and procedure completed today  Continues to suffer from ADHD and anxiety.  Failed multiple SSRIs.  No response to BuSpar.  Going to trial patient on Adderall XR 20 mg.  Continue to monitor symptoms.  Will also have patient complete GeneSight testing for underlying anxiety.  Verruca of the right forearm increasing in size.  Cryotherapy completed today.    Lesion Destruction    Date/Time: 2024 2:30 PM    Performed by: Maryana Green MD  Authorized by: Maryana Green MD  Universal Protocol:  Consent: Verbal consent obtained. Written consent obtained.  Consent given by: patient  Time out: Immediately prior to procedure a \"time out\" was called to verify the correct patient, procedure, equipment, support staff and site/side marked as required.  Patient identity confirmed: verbally with patient    Procedure Details - Lesion Destruction:     Number of Lesions:  1  Lesion 1:     Body area:  Upper extremity    Upper extremity location:  R lower arm    Malignancy: benign hyperkeratotic lesion      Destruction " method: cryotherapy       Tolerated procedure well.  Spot freeze technique was used with Liquid Nitrogen spray.  Completed two Freeze-thaw cycles with complete thawing between cycles.  Discussed side effects with patient.  May require 2-3 more treatments.              Immunizations and preventive care screenings were discussed with patient today. Appropriate education was printed on patient's after visit summary.    Counseling:  Alcohol/drug use: discussed moderation in alcohol intake, the recommendations for healthy alcohol use, and avoidance of illicit drug use.  Dental Health: discussed importance of regular tooth brushing, flossing, and dental visits.  Injury prevention: discussed safety/seat belts, safety helmets, smoke detectors, carbon dioxide detectors, and smoking near bedding or upholstery.  Sexual health: discussed sexually transmitted diseases, partner selection, use of condoms, avoidance of unintended pregnancy, and contraceptive alternatives.  Exercise: the importance of regular exercise/physical activity was discussed. Recommend exercise 3-5 times per week for at least 30 minutes.       Depression Screening and Follow-up Plan: Patient's depression screening was positive with a PHQ-2 score of 4. Their PHQ-9 score was 16. Patient assessed for underlying major depression. Brief counseling provided and recommend additional follow-up/re-evaluation next office visit.         History of Present Illness     Adult Annual Physical:  Patient presents for annual physical.     Diet and Physical Activity:  - Diet/Nutrition: well balanced diet.  - Exercise: walking.    Depression Screening:  - PHQ-2 Score: 4  - PHQ-9 Score: 16    General Health:  - Sleep: sleeps well.  - Hearing: normal hearing bilateral ears.  - Vision: no vision problems.  - Dental: regular dental visits.    Review of Systems   Constitutional:  Negative for activity change, fatigue and fever.   Eyes:  Negative for visual disturbance.  "  Respiratory:  Negative for shortness of breath.    Cardiovascular:  Negative for chest pain.   Gastrointestinal:  Negative for abdominal pain, constipation, diarrhea and nausea.   Endocrine: Negative for cold intolerance and heat intolerance.   Musculoskeletal:  Negative for back pain.   Skin:  Negative for rash.        Wart right forearm   Neurological:  Negative for headaches.   Psychiatric/Behavioral:  Positive for decreased concentration. Negative for confusion. The patient is nervous/anxious.          Objective     /74 (BP Location: Left arm, Patient Position: Sitting, Cuff Size: Standard)   Pulse 78   Temp 98.3 °F (36.8 °C) (Temporal)   Resp 16   Ht 5' 8\" (1.727 m)   Wt 80.7 kg (178 lb)   SpO2 98%   BMI 27.06 kg/m²     Physical Exam  Vitals and nursing note reviewed.   Constitutional:       Appearance: Normal appearance. He is well-developed.   HENT:      Head: Normocephalic and atraumatic.   Cardiovascular:      Rate and Rhythm: Normal rate and regular rhythm.   Pulmonary:      Effort: Pulmonary effort is normal.      Breath sounds: Normal breath sounds.   Abdominal:      General: Bowel sounds are normal.      Palpations: Abdomen is soft.   Musculoskeletal:      Cervical back: Normal range of motion.   Skin:     General: Skin is warm.      Comments: Verruca right forearm   Neurological:      General: No focal deficit present.      Mental Status: He is alert.   Psychiatric:         Mood and Affect: Mood normal.         Speech: Speech normal.         "

## 2024-08-21 NOTE — TELEPHONE ENCOUNTER
Pt is calling back because amphetamine-dextroamphetamine (ADDERALL XR, 20MG,) 20 MG 24 hr capsule Is not available at any local [facility] pharmacy. Please f/u  and give an alternative medication. Thank you in advance  .

## 2024-08-21 NOTE — ASSESSMENT & PLAN NOTE
ADHD questionnaire completed today. Start Adderall XR 20 mg daily. Monitor response to medication. Controlled substance agreement follow up visit if tolerated.     Historically struggled in school, Forgetful, innatentive. Self report scale positive.

## 2024-08-22 DIAGNOSIS — F90.0 ATTENTION DEFICIT HYPERACTIVITY DISORDER (ADHD), PREDOMINANTLY INATTENTIVE TYPE: Primary | ICD-10-CM

## 2024-08-22 RX ORDER — LISDEXAMFETAMINE DIMESYLATE 20 MG/1
20 CAPSULE ORAL EVERY MORNING
Qty: 30 CAPSULE | Refills: 0 | Status: SHIPPED | OUTPATIENT
Start: 2024-08-22

## 2024-09-18 ENCOUNTER — RA CDI HCC (OUTPATIENT)
Dept: OTHER | Facility: HOSPITAL | Age: 29
End: 2024-09-18

## 2024-09-18 NOTE — PROGRESS NOTES
HCC coding opportunities          Chart Reviewed number of suggestions sent to Provider: 1   Please refer to PHQ9 scale - Can depression be classified as per ICD 10 coding guidelines   Patients Insurance        Commercial Insurance: [employer]

## 2024-09-30 ENCOUNTER — OFFICE VISIT (OUTPATIENT)
Dept: FAMILY MEDICINE CLINIC | Facility: CLINIC | Age: 29
End: 2024-09-30
Payer: COMMERCIAL

## 2024-09-30 VITALS
SYSTOLIC BLOOD PRESSURE: 132 MMHG | RESPIRATION RATE: 17 BRPM | HEIGHT: 68 IN | WEIGHT: 183 LBS | BODY MASS INDEX: 27.74 KG/M2 | DIASTOLIC BLOOD PRESSURE: 78 MMHG | HEART RATE: 68 BPM | OXYGEN SATURATION: 98 % | TEMPERATURE: 98 F

## 2024-09-30 DIAGNOSIS — F51.01 PRIMARY INSOMNIA: ICD-10-CM

## 2024-09-30 DIAGNOSIS — F90.0 ATTENTION DEFICIT HYPERACTIVITY DISORDER (ADHD), PREDOMINANTLY INATTENTIVE TYPE: ICD-10-CM

## 2024-09-30 PROCEDURE — 99214 OFFICE O/P EST MOD 30 MIN: CPT | Performed by: FAMILY MEDICINE

## 2024-09-30 RX ORDER — TRAZODONE HYDROCHLORIDE 50 MG/1
50 TABLET, FILM COATED ORAL
Qty: 90 TABLET | Refills: 1 | Status: SHIPPED | OUTPATIENT
Start: 2024-09-30

## 2024-09-30 RX ORDER — LISDEXAMFETAMINE DIMESYLATE 20 MG/1
20 CAPSULE ORAL EVERY MORNING
Qty: 30 CAPSULE | Refills: 0 | Status: SHIPPED | OUTPATIENT
Start: 2024-09-30 | End: 2024-10-01 | Stop reason: SDUPTHER

## 2024-09-30 NOTE — PROGRESS NOTES
"Well.  Does note sensation of medicine not working as well as it initially did but is working.  Please see may need a higher dose lets continue this from there okay feel like it really weans off and he cannot tolerate it we can increase to 20 okayAmbulatory Visit  Name: Primitivo Paris      : 1995      MRN: [id number]  Encounter Provider: Maryana Green MD  Encounter Date: 2024   Encounter department: [facility]    Assessment & Plan  Attention deficit hyperactivity disorder (ADHD), predominantly inattentive type  Patient has been tolerating medication well.  Continue Vyvanse 20 mg daily.  May need higher dose. Patient will contact us if he feels a higher dose is necessary.   Orders:    lisdexamfetamine (VYVANSE) 20 MG capsule; Take 1 capsule (20 mg total) by mouth every morning Max Daily Amount: 20 mg    Primary insomnia    Orders:    traZODone (DESYREL) 50 mg tablet; Take 1 tablet (50 mg total) by mouth daily at bedtime       History of Present Illness     Patient presents with:  Follow-up: 4 week              Review of Systems   Constitutional:  Negative for activity change, fatigue and fever.   Eyes:  Negative for visual disturbance.   Respiratory:  Negative for shortness of breath.    Cardiovascular:  Negative for chest pain.   Gastrointestinal:  Negative for abdominal pain, constipation, diarrhea and nausea.   Endocrine: Negative for cold intolerance and heat intolerance.   Musculoskeletal:  Negative for back pain.   Skin:  Negative for rash.   Neurological:  Negative for headaches.   Psychiatric/Behavioral:  Negative for confusion.            Objective     /78 (BP Location: Left arm, Patient Position: Sitting, Cuff Size: Standard)   Pulse 68   Temp 98 °F (36.7 °C) (Temporal)   Resp 17   Ht 5' 8\" (1.727 m)   Wt 83 kg (183 lb)   SpO2 98%   BMI 27.83 kg/m²     Physical Exam  Vitals and nursing note reviewed.   Constitutional:       Appearance: Normal " appearance. He is well-developed.   HENT:      Head: Normocephalic and atraumatic.   Cardiovascular:      Rate and Rhythm: Normal rate and regular rhythm.   Pulmonary:      Effort: Pulmonary effort is normal.      Breath sounds: Normal breath sounds.   Abdominal:      General: Bowel sounds are normal.      Palpations: Abdomen is soft.   Musculoskeletal:      Cervical back: Normal range of motion.   Skin:     General: Skin is warm.   Neurological:      General: No focal deficit present.      Mental Status: He is alert.   Psychiatric:         Mood and Affect: Mood normal.         Speech: Speech normal.

## 2024-09-30 NOTE — ASSESSMENT & PLAN NOTE
Patient has been tolerating medication well.  Continue Vyvanse 20 mg daily.  May need higher dose. Patient will contact us if he feels a higher dose is necessary.   Orders:    lisdexamfetamine (VYVANSE) 20 MG capsule; Take 1 capsule (20 mg total) by mouth every morning Max Daily Amount: 20 mg

## 2024-10-01 DIAGNOSIS — F90.0 ATTENTION DEFICIT HYPERACTIVITY DISORDER (ADHD), PREDOMINANTLY INATTENTIVE TYPE: ICD-10-CM

## 2024-10-01 RX ORDER — LISDEXAMFETAMINE DIMESYLATE 20 MG/1
20 CAPSULE ORAL EVERY MORNING
Qty: 30 CAPSULE | Refills: 0 | Status: SHIPPED | OUTPATIENT
Start: 2024-10-01 | End: 2024-10-03 | Stop reason: SDUPTHER

## 2024-10-01 NOTE — TELEPHONE ENCOUNTER
Reason for call:   [x] Refill   [] Prior Auth  [x] Other: NOT A DUPLICATE. Change in pharm      Office:   [x] PCP/Provider - Maryana Green MD   [] Specialty/Provider -     Medication:     lisdexamfetamine (VYVANSE) 20 MG capsule       Dose/Frequency: Take 1 capsule (20 mg total) by mouth every morning Max Daily Amount: 20 mg     Quantity: 30    Pharmacy: [id number] [address] [place] [address] [phone]     Does the patient have enough for 3 days?   [] Yes   [x] No - Send as HP to POD

## 2024-10-03 DIAGNOSIS — F90.0 ATTENTION DEFICIT HYPERACTIVITY DISORDER (ADHD), PREDOMINANTLY INATTENTIVE TYPE: ICD-10-CM

## 2024-10-03 RX ORDER — LISDEXAMFETAMINE DIMESYLATE 20 MG/1
20 CAPSULE ORAL EVERY MORNING
Qty: 30 CAPSULE | Refills: 0 | Status: SHIPPED | OUTPATIENT
Start: 2024-10-03

## 2024-11-09 DIAGNOSIS — F90.0 ATTENTION DEFICIT HYPERACTIVITY DISORDER (ADHD), PREDOMINANTLY INATTENTIVE TYPE: ICD-10-CM

## 2024-11-11 RX ORDER — LISDEXAMFETAMINE DIMESYLATE 20 MG/1
20 CAPSULE ORAL EVERY MORNING
Qty: 30 CAPSULE | Refills: 0 | Status: SHIPPED | OUTPATIENT
Start: 2024-11-11

## 2024-11-12 ENCOUNTER — NURSE TRIAGE (OUTPATIENT)
Age: 29
End: 2024-11-12

## 2024-11-12 NOTE — TELEPHONE ENCOUNTER
Reason for Disposition  • Third attempt to contact caller AND no contact made. Phone number verified.    Protocols used: No Contact or Duplicate Contact Call-Adult-OH
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
## 2024-11-13 DIAGNOSIS — M54.6 CHRONIC MIDLINE THORACIC BACK PAIN: Primary | ICD-10-CM

## 2024-11-13 DIAGNOSIS — G89.29 CHRONIC MIDLINE THORACIC BACK PAIN: Primary | ICD-10-CM

## 2024-12-02 ENCOUNTER — TELEPHONE (OUTPATIENT)
Dept: OBGYN CLINIC | Facility: MEDICAL CENTER | Age: 29
End: 2024-12-02

## 2024-12-02 ENCOUNTER — CONSULT (OUTPATIENT)
Dept: PAIN MEDICINE | Facility: CLINIC | Age: 29
End: 2024-12-02
Payer: COMMERCIAL

## 2024-12-02 VITALS
SYSTOLIC BLOOD PRESSURE: 143 MMHG | DIASTOLIC BLOOD PRESSURE: 72 MMHG | BODY MASS INDEX: 27.83 KG/M2 | HEIGHT: 68 IN | HEART RATE: 80 BPM

## 2024-12-02 DIAGNOSIS — M47.814 THORACIC SPONDYLOSIS: Primary | ICD-10-CM

## 2024-12-02 DIAGNOSIS — M79.18 MYOFASCIAL PAIN SYNDROME: ICD-10-CM

## 2024-12-02 DIAGNOSIS — F32.2 SEVERE MAJOR DEPRESSIVE DISORDER (HCC): ICD-10-CM

## 2024-12-02 DIAGNOSIS — M54.6 CHRONIC MIDLINE THORACIC BACK PAIN: ICD-10-CM

## 2024-12-02 DIAGNOSIS — G89.29 CHRONIC MIDLINE THORACIC BACK PAIN: ICD-10-CM

## 2024-12-02 PROCEDURE — 99244 OFF/OP CNSLTJ NEW/EST MOD 40: CPT | Performed by: PAIN MEDICINE

## 2024-12-02 RX ORDER — METHOCARBAMOL 500 MG/1
500 TABLET, FILM COATED ORAL 3 TIMES DAILY
Qty: 90 TABLET | Refills: 1 | Status: SHIPPED | OUTPATIENT
Start: 2024-12-02
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
## 2024-12-02 NOTE — TELEPHONE ENCOUNTER
Due to patient's work, scheduling stated that an orbital xray would need to be done prior to patient's MRI     Please enter script

## 2024-12-17 ENCOUNTER — RA CDI HCC (OUTPATIENT)
Dept: OTHER | Facility: HOSPITAL | Age: 29
End: 2024-12-17

## 2024-12-18 ENCOUNTER — APPOINTMENT (OUTPATIENT)
Dept: RADIOLOGY | Age: 29
End: 2024-12-18
Payer: COMMERCIAL

## 2024-12-18 ENCOUNTER — HOSPITAL ENCOUNTER (OUTPATIENT)
Dept: RADIOLOGY | Age: 29
Discharge: HOME | End: 2024-12-18
Payer: COMMERCIAL

## 2024-12-18 DIAGNOSIS — M47.814 THORACIC SPONDYLOSIS: ICD-10-CM

## 2024-12-18 DIAGNOSIS — M54.6 CHRONIC MIDLINE THORACIC BACK PAIN: ICD-10-CM

## 2024-12-18 DIAGNOSIS — G89.29 CHRONIC MIDLINE THORACIC BACK PAIN: ICD-10-CM

## 2024-12-18 DIAGNOSIS — M79.18 MYOFASCIAL PAIN SYNDROME: ICD-10-CM

## 2024-12-18 PROCEDURE — 70030 X-RAY EYE FOR FOREIGN BODY: CPT

## 2024-12-18 PROCEDURE — 72146 MRI CHEST SPINE W/O DYE: CPT

## 2024-12-22 ENCOUNTER — RESULTS FOLLOW-UP (OUTPATIENT)
Dept: PAIN MEDICINE | Facility: CLINIC | Age: 29
End: 2024-12-22

## 2024-12-23 NOTE — TELEPHONE ENCOUNTER
S/w pt and advised of same. Pt verbalized understanding and appreciation.    Pt had to cancel appt tomorrow d/t work and would like to reschedule for as soon as possible.  Please assist pt. Thank you    Pt would like to be seen in [place] office

## 2024-12-23 NOTE — TELEPHONE ENCOUNTER
----- Message from Andrei Rooney MD sent at 12/22/2024  4:55 PM EST -----  Chronic T3 comporession fracture  Lets ayan trinh in the office next steps

## 2024-12-30 ENCOUNTER — OFFICE VISIT (OUTPATIENT)
Dept: FAMILY MEDICINE CLINIC | Facility: CLINIC | Age: 29
End: 2024-12-30
Payer: COMMERCIAL

## 2024-12-30 VITALS
SYSTOLIC BLOOD PRESSURE: 138 MMHG | BODY MASS INDEX: 28.64 KG/M2 | TEMPERATURE: 98.3 F | DIASTOLIC BLOOD PRESSURE: 70 MMHG | RESPIRATION RATE: 16 BRPM | WEIGHT: 189 LBS | OXYGEN SATURATION: 99 % | HEART RATE: 75 BPM | HEIGHT: 68 IN

## 2024-12-30 DIAGNOSIS — F41.1 GAD (GENERALIZED ANXIETY DISORDER): Primary | ICD-10-CM

## 2024-12-30 DIAGNOSIS — F32.2 SEVERE MAJOR DEPRESSIVE DISORDER (HCC): ICD-10-CM

## 2024-12-30 PROCEDURE — 99214 OFFICE O/P EST MOD 30 MIN: CPT | Performed by: FAMILY MEDICINE

## 2024-12-30 RX ORDER — FLUOXETINE 10 MG/1
10 CAPSULE ORAL DAILY
Qty: 30 CAPSULE | Refills: 2 | Status: SHIPPED | OUTPATIENT
Start: 2024-12-30

## 2024-12-30 NOTE — ASSESSMENT & PLAN NOTE
Patient has underlying anxiety and depression.  Did not feel response to ADHD medication.  Also was becoming cost prohibitive.  With his underlying anxiety he would like to restart Prozac.  Plan to start Prozac at 10 mg daily.  Will follow-up in 6 weeks to assess response to medication.  Denies any suicidal thoughts or ideas.  Recommend establishing with therapist  Orders:    FLUoxetine (PROzac) 10 mg capsule; Take 1 capsule (10 mg total) by mouth daily

## 2024-12-30 NOTE — PROGRESS NOTES
"Name: Primitivo Paris      : 1995      MRN: [id number]  Encounter Provider: Maryana Green MD  Encounter Date: 2024   Encounter department: [facility]  :  Assessment & Plan  TROY (generalized anxiety disorder)  Patient has underlying anxiety and depression.  Did not feel response to ADHD medication.  Also was becoming cost prohibitive.  With his underlying anxiety he would like to restart Prozac.  Plan to start Prozac at 10 mg daily.  Will follow-up in 6 weeks to assess response to medication.  Denies any suicidal thoughts or ideas.  Recommend establishing with therapist  Orders:    FLUoxetine (PROzac) 10 mg capsule; Take 1 capsule (10 mg total) by mouth daily    Severe major depressive disorder (HCC)      Orders:    FLUoxetine (PROzac) 10 mg capsule; Take 1 capsule (10 mg total) by mouth daily           History of Present Illness     Patient presents with:  Follow-up: 3 m           Review of Systems   Constitutional:  Negative for activity change, fatigue and fever.   Eyes:  Negative for visual disturbance.   Respiratory:  Negative for shortness of breath.    Cardiovascular:  Negative for chest pain.   Gastrointestinal:  Negative for abdominal pain, constipation, diarrhea and nausea.   Endocrine: Negative for cold intolerance and heat intolerance.   Musculoskeletal:  Negative for back pain.   Skin:  Negative for rash.   Neurological:  Negative for headaches.   Psychiatric/Behavioral:  Negative for confusion and suicidal ideas. The patient is nervous/anxious.        Objective   /70 (BP Location: Left arm, Patient Position: Sitting, Cuff Size: Large)   Pulse 75   Temp 98.3 °F (36.8 °C) (Temporal)   Resp 16   Ht 5' 8\" (1.727 m)   Wt 85.7 kg (189 lb)   SpO2 99%   BMI 28.74 kg/m²      Physical Exam  Vitals and nursing note reviewed.   Constitutional:       Appearance: Normal appearance. He is well-developed.   HENT:      Head: Normocephalic and atraumatic. "   Cardiovascular:      Rate and Rhythm: Normal rate and regular rhythm.   Pulmonary:      Effort: Pulmonary effort is normal.      Breath sounds: Normal breath sounds.   Abdominal:      General: Bowel sounds are normal.      Palpations: Abdomen is soft.   Musculoskeletal:      Cervical back: Normal range of motion.   Skin:     General: Skin is warm.   Neurological:      General: No focal deficit present.      Mental Status: He is alert.   Psychiatric:         Mood and Affect: Mood normal.         Speech: Speech normal.

## 2025-01-21 ENCOUNTER — OFFICE VISIT (OUTPATIENT)
Dept: PAIN MEDICINE | Facility: CLINIC | Age: 30
End: 2025-01-21
Payer: COMMERCIAL

## 2025-01-21 VITALS
HEIGHT: 68 IN | SYSTOLIC BLOOD PRESSURE: 157 MMHG | BODY MASS INDEX: 28.64 KG/M2 | HEART RATE: 79 BPM | DIASTOLIC BLOOD PRESSURE: 94 MMHG | WEIGHT: 189 LBS

## 2025-01-21 DIAGNOSIS — F32.2 SEVERE MAJOR DEPRESSIVE DISORDER (HCC): ICD-10-CM

## 2025-01-21 DIAGNOSIS — M47.814 THORACIC SPONDYLOSIS: ICD-10-CM

## 2025-01-21 DIAGNOSIS — M51.9 THORACIC DISC DISEASE: Primary | ICD-10-CM

## 2025-01-21 DIAGNOSIS — F41.1 GAD (GENERALIZED ANXIETY DISORDER): ICD-10-CM

## 2025-01-21 DIAGNOSIS — S22.030S COMPRESSION FRACTURE OF T3 VERTEBRA, SEQUELA: ICD-10-CM

## 2025-01-21 PROCEDURE — 99214 OFFICE O/P EST MOD 30 MIN: CPT | Performed by: PAIN MEDICINE

## 2025-01-21 RX ORDER — FLUOXETINE 10 MG/1
10 CAPSULE ORAL DAILY
Qty: 90 CAPSULE | Refills: 0 | Status: SHIPPED | OUTPATIENT
Start: 2025-01-21
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
## 2025-02-20 ENCOUNTER — HOSPITAL ENCOUNTER (OUTPATIENT)
Dept: RADIOLOGY | Facility: HOSPITAL | Age: 30
Discharge: HOME | End: 2025-02-20
Attending: PAIN MEDICINE
Payer: COMMERCIAL

## 2025-02-20 VITALS
SYSTOLIC BLOOD PRESSURE: 186 MMHG | OXYGEN SATURATION: 95 % | HEART RATE: 69 BPM | TEMPERATURE: 98.4 F | RESPIRATION RATE: 18 BRPM | DIASTOLIC BLOOD PRESSURE: 62 MMHG

## 2025-02-20 DIAGNOSIS — S22.030S COMPRESSION FRACTURE OF T3 VERTEBRA, SEQUELA: ICD-10-CM

## 2025-02-20 DIAGNOSIS — M51.9 THORACIC DISC DISEASE: ICD-10-CM

## 2025-02-20 PROBLEM — S22.030A COMPRESSION FRACTURE OF T3 VERTEBRA (HCC): Status: ACTIVE | Noted: 2025-02-20

## 2025-02-20 PROCEDURE — 62321 NJX INTERLAMINAR CRV/THRC: CPT | Performed by: PAIN MEDICINE

## 2025-02-20 RX ORDER — METHYLPREDNISOLONE ACETATE 80 MG/ML
80 INJECTION, SUSPENSION INTRA-ARTICULAR; INTRALESIONAL; INTRAMUSCULAR; PARENTERAL; SOFT TISSUE ONCE
Status: COMPLETED | OUTPATIENT
Start: 2025-02-20 | End: 2025-02-20

## 2025-02-20 RX ADMIN — IOHEXOL 1 ML: 300 INJECTION, SOLUTION INTRAVENOUS at 14:42

## 2025-02-20 RX ADMIN — METHYLPREDNISOLONE ACETATE 80 MG: 80 INJECTION, SUSPENSION INTRA-ARTICULAR; INTRALESIONAL; INTRAMUSCULAR; SOFT TISSUE at 14:43

## 2025-02-20 NOTE — DISCHARGE INSTR - LAB
Epidural Steroid Injection   WHAT YOU NEED TO KNOW:   An epidural steroid injection (MACI) is a procedure to inject steroid medicine into the epidural space. The epidural space is between your spinal cord and vertebrae. Steroids reduce inflammation and fluid buildup in your spine that may be causing pain. You may be given pain medicine along with the steroids.          ACTIVITY  Do not drive or operate machinery today.  No strenuous activity today - bending, lifting, etc.  You may resume normal activites starting tomorrow - start slowly and as tolerated.  You may shower today, but no tub baths or hot tubs.  You may have numbness for several hours from the local anesthetic. Please use caution and common sense, especially with weight-bearing activities.    CARE OF THE INJECTION SITE  If you have soreness or pain, apply ice to the area today (20 minutes on/20 minutes off).  Starting tomorrow, you may use warm, moist heat or ice if needed.  You may have an increase or change in your discomfort for 36-48 hours after your treatment.  Apply ice and continue with any pain medication you have been prescribed.  Notify the [facility] if you have any of the following: redness, drainage, swelling, headache, stiff neck or fever above 100°F.    SPECIAL INSTRUCTIONS  Our office will contact you in approximately 14 days for a progress report.    MEDICATIONS  Continue to take all routine medications.  Our office may have instructed you to hold some medications.    As no general anesthesia was used in today's procedure, you should not experience any side effects related to anesthesia.     If you are diabetic, the steroids used in today's injection may temporarily increase your blood sugar levels after the first few days after your injection. Please keep a close eye on your sugars and alert the doctor who manages your diabetes if your sugars are significantly high from your baseline or you are symptomatic.     If you have a  problem specifically related to your procedure, please call our office at [phone].  Problems not related to your procedure should be directed to your primary care physician.

## 2025-02-20 NOTE — H&P
History of Present Illness: The patient is a 29 y.o. male who presents with complaints of mid back pain    Past Medical History:   Diagnosis Date    Anxiety     Myopericarditis 03/09/2020    Myopericarditis 03/28/2022    Other chest pain 02/11/2020    Palpitations 02/04/2015    Spina bifida occulta 11/03/2015       No past surgical history on file.      Current Outpatient Medications:     FLUoxetine (PROzac) 10 mg capsule, TAKE 1 CAPSULE BY MOUTH EVERY DAY, Disp: 90 capsule, Rfl: 0    lisdexamfetamine (VYVANSE) 20 MG capsule, Take 1 capsule (20 mg total) by mouth every morning Max Daily Amount: 20 mg (Patient not taking: Reported on 12/30/2024), Disp: 30 capsule, Rfl: 0    methocarbamol (ROBAXIN) 500 mg tablet, Take 1 tablet (500 mg total) by mouth 3 (three) times a day, Disp: 90 tablet, Rfl: 1    rosuvastatin (CRESTOR) 20 MG tablet, Take 1 tablet (20 mg total) by mouth daily (Patient not taking: Reported on 12/2/2024), Disp: 90 tablet, Rfl: 0    traZODone (DESYREL) 50 mg tablet, Take 1 tablet (50 mg total) by mouth daily at bedtime, Disp: 90 tablet, Rfl: 1    No Known Allergies    Physical Exam:   Vitals:    02/20/25 1421   BP: 168/77   Pulse: 78   Resp: 16   Temp: 98.4 °F (36.9 °C)   SpO2: 97%     General: Awake, Alert, Oriented x 3, Mood and affect appropriate  Respiratory: Respirations even and unlabored  Cardiovascular: Peripheral pulses intact; no edema  Musculoskeletal Exam: TTP mid back    ASA Score: 2    Patient/Chart Verification  Patient ID Verified: Verbal  ID Band Applied: No  H&P( within 30 days) Verified: To be obtained in the Procedural area  Allergies Reviewed: Yes  Anticoag/NSAID held?: NA  Currently on antibiotics?: No    Assessment:   1. Thoracic disc disease    2. Compression fracture of T3 vertebra, sequela        Plan: T2-3 Interlaminar epidural      The risks of the procedure were discussed in detail.  These risks include infection, increased pain, paralysis, bleeding.  Patient understands  the risks and is willing to pursue with the procedure

## 2025-03-06 ENCOUNTER — TELEPHONE (OUTPATIENT)
Dept: PAIN MEDICINE | Facility: MEDICAL CENTER | Age: 30
End: 2025-03-06

## 2025-03-10 ENCOUNTER — APPOINTMENT (OUTPATIENT)
Dept: RADIOLOGY | Facility: MEDICAL CENTER | Age: 30
End: 2025-03-10
Payer: COMMERCIAL

## 2025-03-10 ENCOUNTER — OFFICE VISIT (OUTPATIENT)
Dept: URGENT CARE | Facility: MEDICAL CENTER | Age: 30
End: 2025-03-10
Payer: COMMERCIAL

## 2025-03-10 VITALS
OXYGEN SATURATION: 98 % | TEMPERATURE: 98.7 F | HEART RATE: 80 BPM | DIASTOLIC BLOOD PRESSURE: 88 MMHG | RESPIRATION RATE: 18 BRPM | SYSTOLIC BLOOD PRESSURE: 138 MMHG

## 2025-03-10 DIAGNOSIS — S99.911A INJURY OF RIGHT ANKLE, INITIAL ENCOUNTER: ICD-10-CM

## 2025-03-10 DIAGNOSIS — S90.31XA CONTUSION OF RIGHT HEEL, INITIAL ENCOUNTER: Primary | ICD-10-CM

## 2025-03-10 PROCEDURE — 73610 X-RAY EXAM OF ANKLE: CPT

## 2025-03-10 PROCEDURE — G0382 LEV 3 HOSP TYPE B ED VISIT: HCPCS | Performed by: PHYSICIAN ASSISTANT

## 2025-03-10 PROCEDURE — 73650 X-RAY EXAM OF HEEL: CPT

## 2025-03-10 PROCEDURE — S9083 URGENT CARE CENTER GLOBAL: HCPCS | Performed by: PHYSICIAN ASSISTANT
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
## 2025-03-10 NOTE — LETTER
March 10, 2025     Patient: Primitivo Paris   YOB: 1995   Date of Visit: 3/10/2025       To Whom it May Concern:    Primitivo Paris was seen in my clinic on 3/10/2025. He may return to work on 3/14/2025 .    If you have any questions or concerns, please don't hesitate to call.         Sincerely,          Phan Doshi PA-C        CC: No Recipients
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
## 2025-03-10 NOTE — PATIENT INSTRUCTIONS
Contusion of heel  Over-the-counter pain medication as needed  Follow-up with orthopedics  Boot provided  Patient declined crutches at this time  Follow up with PCP in 3-5 days.  Proceed to  ER if symptoms worsen.

## 2025-03-27 ENCOUNTER — RA CDI HCC (OUTPATIENT)
Dept: OTHER | Facility: HOSPITAL | Age: 30
End: 2025-03-27

## 2025-03-27 NOTE — PROGRESS NOTES
HCC coding opportunities       Chart reviewed, no opportunity found: CHART REVIEWED, NO OPPORTUNITY FOUND      This is a reminder to address (resolve/update/assess) ALL HCC (risk adjustment) codes as found on active problem list for 2025 as patient scores reset to zero HERMILO.  Patients Insurance        Commercial Insurance: [employer]

## 2025-04-15 ENCOUNTER — OFFICE VISIT (OUTPATIENT)
Dept: PODIATRY | Facility: CLINIC | Age: 30
End: 2025-04-15
Payer: COMMERCIAL

## 2025-04-15 VITALS — WEIGHT: 180 LBS | HEIGHT: 68 IN | BODY MASS INDEX: 27.28 KG/M2

## 2025-04-15 DIAGNOSIS — S90.31XA CONTUSION OF RIGHT HEEL, INITIAL ENCOUNTER: ICD-10-CM

## 2025-04-15 PROCEDURE — 99243 OFF/OP CNSLTJ NEW/EST LOW 30: CPT | Performed by: PODIATRIST
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
## 2025-04-21 ENCOUNTER — OFFICE VISIT (OUTPATIENT)
Dept: FAMILY MEDICINE CLINIC | Facility: CLINIC | Age: 30
End: 2025-04-21
Payer: COMMERCIAL

## 2025-04-21 VITALS
RESPIRATION RATE: 16 BRPM | TEMPERATURE: 98.1 F | SYSTOLIC BLOOD PRESSURE: 162 MMHG | BODY MASS INDEX: 28.81 KG/M2 | WEIGHT: 189.5 LBS | OXYGEN SATURATION: 97 % | HEART RATE: 78 BPM | DIASTOLIC BLOOD PRESSURE: 98 MMHG

## 2025-04-21 DIAGNOSIS — S22.030S COMPRESSION FRACTURE OF T3 VERTEBRA, SEQUELA: ICD-10-CM

## 2025-04-21 DIAGNOSIS — E78.2 MIXED HYPERLIPIDEMIA: ICD-10-CM

## 2025-04-21 DIAGNOSIS — F32.2 SEVERE MAJOR DEPRESSIVE DISORDER (HCC): ICD-10-CM

## 2025-04-21 DIAGNOSIS — R03.0 ELEVATED BP WITHOUT DIAGNOSIS OF HYPERTENSION: Primary | ICD-10-CM

## 2025-04-21 PROCEDURE — 99214 OFFICE O/P EST MOD 30 MIN: CPT | Performed by: FAMILY MEDICINE

## 2025-04-21 NOTE — PROGRESS NOTES
Name: Primitivo Paris      : 1995      MRN: [id number]  Encounter Provider: Maryana Green MD  Encounter Date: 2025   Encounter department: [facility]  :  Assessment & Plan  Elevated BP without diagnosis of hypertension  BP Readings from Last 3 Encounters:   25 162/98   03/10/25 138/88   25 (!) 186/62     Multiple elevated blood pressure readings.  He does have a home cuff.  Plan to have patient monitor pressures once in the morning and once at night daily for the next 2 weeks.  Will obtain blood work ordered below.  Follow-up in 2 weeks to recheck blood pressure.  Would like patient to bring his blood pressure cuff to that visit with him.  Orders:    CBC and differential; Future    Comprehensive metabolic panel; Future    Mixed hyperlipidemia    Orders:    Lipid panel; Future    Compression fracture of T3 vertebra, sequela         Severe major depressive disorder (HCC)  Responded well to starting Prozac.  Continue Prozac 10 mg daily.                History of Present Illness   Patient presents with:  Hypertension: Follow up on bp since its been high the last couple times     Patient presents today to follow-up on his blood pressure.  Past few doctors visits his readings have been elevated.  He had an epidural spinal injection and he was not allowed to leave the hospital due to elevated blood pressure.  Blood pressure today in office 160/98.  It is a blood pressure cuff at home.  Denies any headaches or changes in vision.    Hypertension  Pertinent negatives include no chest pain, headaches or shortness of breath.     Review of Systems   Constitutional:  Negative for activity change, fatigue and fever.   Eyes:  Negative for visual disturbance.   Respiratory:  Negative for shortness of breath.    Cardiovascular:  Negative for chest pain.   Gastrointestinal:  Negative for abdominal pain, constipation, diarrhea and nausea.   Endocrine: Negative for cold  intolerance and heat intolerance.   Musculoskeletal:  Negative for back pain.   Skin:  Negative for rash.   Neurological:  Negative for headaches.   Psychiatric/Behavioral:  Negative for confusion.        Objective   /98 (BP Location: Left arm, Patient Position: Sitting, Cuff Size: Large)   Pulse 78   Temp 98.1 °F (36.7 °C) (Temporal)   Resp 16   Wt 86 kg (189 lb 8 oz)   SpO2 97%   BMI 28.81 kg/m²      Physical Exam  Vitals and nursing note reviewed.   Constitutional:       Appearance: Normal appearance. He is well-developed.   HENT:      Head: Normocephalic and atraumatic.   Cardiovascular:      Rate and Rhythm: Normal rate and regular rhythm.   Pulmonary:      Effort: Pulmonary effort is normal.      Breath sounds: Normal breath sounds.   Abdominal:      General: Bowel sounds are normal.      Palpations: Abdomen is soft.   Musculoskeletal:      Cervical back: Normal range of motion.   Skin:     General: Skin is warm.   Neurological:      General: No focal deficit present.      Mental Status: He is alert.   Psychiatric:         Mood and Affect: Mood normal.         Speech: Speech normal.

## 2025-05-03 DIAGNOSIS — F41.1 GAD (GENERALIZED ANXIETY DISORDER): ICD-10-CM

## 2025-05-03 DIAGNOSIS — F32.2 SEVERE MAJOR DEPRESSIVE DISORDER (HCC): ICD-10-CM

## 2025-05-03 RX ORDER — FLUOXETINE 10 MG/1
10 CAPSULE ORAL DAILY
Qty: 90 CAPSULE | Refills: 0 | Status: SHIPPED | OUTPATIENT
Start: 2025-05-03

## 2025-05-09 DIAGNOSIS — H10.9 CONJUNCTIVITIS OF BOTH EYES, UNSPECIFIED CONJUNCTIVITIS TYPE: Primary | ICD-10-CM

## 2025-05-09 RX ORDER — POLYMYXIN B SULFATE AND TRIMETHOPRIM 1; 10000 MG/ML; [USP'U]/ML
1 SOLUTION OPHTHALMIC EVERY 4 HOURS
Qty: 10 ML | Refills: 0 | Status: SHIPPED | OUTPATIENT
Start: 2025-05-09 | End: 2025-05-19

## 2025-08-05 DIAGNOSIS — F41.1 GAD (GENERALIZED ANXIETY DISORDER): ICD-10-CM

## 2025-08-05 DIAGNOSIS — F32.2 SEVERE MAJOR DEPRESSIVE DISORDER (HCC): ICD-10-CM

## 2025-08-06 RX ORDER — FLUOXETINE 10 MG/1
10 CAPSULE ORAL DAILY
Qty: 90 CAPSULE | Refills: 1 | Status: SHIPPED | OUTPATIENT
Start: 2025-08-06

## 2025-08-20 DIAGNOSIS — F51.01 PRIMARY INSOMNIA: ICD-10-CM

## 2025-08-21 RX ORDER — TRAZODONE HYDROCHLORIDE 50 MG/1
50 TABLET ORAL
Qty: 90 TABLET | Refills: 1 | Status: SHIPPED | OUTPATIENT
Start: 2025-08-21